# Patient Record
Sex: FEMALE | Race: ASIAN
[De-identification: names, ages, dates, MRNs, and addresses within clinical notes are randomized per-mention and may not be internally consistent; named-entity substitution may affect disease eponyms.]

---

## 2021-03-31 ENCOUNTER — HOSPITAL ENCOUNTER (EMERGENCY)
Dept: HOSPITAL 76 - ED | Age: 26
Discharge: HOME | End: 2021-03-31
Payer: COMMERCIAL

## 2021-03-31 VITALS — DIASTOLIC BLOOD PRESSURE: 80 MMHG | SYSTOLIC BLOOD PRESSURE: 121 MMHG

## 2021-03-31 DIAGNOSIS — M72.2: Primary | ICD-10-CM

## 2021-03-31 PROCEDURE — 99282 EMERGENCY DEPT VISIT SF MDM: CPT

## 2021-03-31 PROCEDURE — 99283 EMERGENCY DEPT VISIT LOW MDM: CPT

## 2021-03-31 NOTE — XRAY REPORT
PROCEDURE:  Calcaneus RT

 

INDICATIONS:  foot pain

 

TECHNIQUE:  Two views of the calcaneus were acquired.  

 

COMPARISON:  None

 

FINDINGS:  

Bones:  No fractures or dislocations.  No suspicious bony lesions.  

 

Soft tissues:  No suspicious calcifications.  Achilles tendon appears normal.  

 

IMPRESSION:  

No evidence of acute bony abnormality of the right calcaneus.

 

Reviewed by: Brice Ashraf MD on 3/31/2021 8:38 PM PDT

Approved by: Brice Ashraf MD on 3/31/2021 8:38 PM PDT

 

 

Station ID:  [id number]

## 2021-03-31 NOTE — ED PHYSICIAN DOCUMENTATION
<Beka Drummond - Last Filed: 03/31/21 20:08>





PD HPI LOWER EXT INJURY





- Stated complaint


Stated Complaint: RIGHT FOOT PX





- Chief complaint


Chief Complaint: Trauma Ext





- History obtained from


History obtained from: Patient





- Additional information


Additional information: 





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 

felt a sudden severe pain in her right heel.  No fall.  But pain was bad enough 

that it made her see white briefly.  She points to the anterior/inferior part of

the heel as the site of the pain.





Review of Systems


Constitutional: reports: Reviewed and negative


Eyes: reports: Reviewed and negative


Ears: reports: Reviewed and negative


Nose: reports: Reviewed and negative


Throat: reports: Reviewed and negative


Cardiac: reports: Reviewed and negative





PD PAST MEDICAL HISTORY





- Past Medical History


Psych: Panic attacks





- Past Surgical History


Past Surgical History: Yes





- Present Medications


Home Medications: 


                                Ambulatory Orders











 Medication  Instructions  Recorded  Confirmed


 


Birth Control 1 tab PO DAILY 06/03/16 03/31/21














- Allergies


Allergies/Adverse Reactions: 


                                    Allergies











Allergy/AdvReac Type Severity Reaction Status Date / Time


 


No Known Drug Allergies Allergy   Verified 03/31/21 18:08














- Social History


Does the pt smoke?: No


Smoking Status: Never smoker


Does the pt drink ETOH?: No


Does the pt have substance abuse?: No





- Immunizations


Immunizations are current?: Yes





PD ED PE NORMAL





- Vitals


Vital signs reviewed: Yes





- General


General: Alert and oriented X 3, No acute distress





- Extremities


Extremities: Other (No Achilles tenderness, no posterior calcaneal tenderness.  

She has tenderness at the insertion of the plantar fascia of the right foot 

without plantar fascial tenderness.)





- Neuro


Neuro: Alert and oriented X 3, Normal speech





<Franklin Mckeon - Last Filed: 03/31/21 20:11>





PD HPI LOWER EXT INJURY





- History obtained from


History obtained from: Patient





- History of Present Illness


PD HPI LOW EXT INJURY LOCATION: Right, Foot, Sole / plantar





Results





- Vitals


Vitals: 





                               Vital Signs - 24 hr











  03/31/21





  18:08


 


Temperature 36.8 C


 


Heart Rate 80


 


Respiratory 16





Rate 


 


Blood Pressure 121/80


 


O2 Saturation 99








                                     Oxygen











O2 Source                      Room air

## 2021-03-31 NOTE — ED PHYSICIAN DOCUMENTATION
PD HPI UPPER EXT INJURY





- Stated complaint


Stated Complaint: RIGHT FOOT PX





- Chief complaint


Chief Complaint: Trauma Ext





- History obtained from


History obtained from: Patient





- Additonal information


Additional information: 





She was walking down a ramp today and boots and felt a sudden stabbing pain in 

the bottom of the right heel.  Now it hurts anytime she puts pressure on it.





Review of Systems


Constitutional: reports: Reviewed and negative


Eyes: reports: Reviewed and negative


Ears: reports: Reviewed and negative


Nose: reports: Reviewed and negative





PD PAST MEDICAL HISTORY





- Past Medical History


Past Medical History: No


Psych: Panic attacks





- Past Surgical History


Past Surgical History: Yes





- Present Medications


Home Medications: 


                                Ambulatory Orders











 Medication  Instructions  Recorded  Confirmed


 


Birth Control 1 tab PO DAILY 06/03/16 03/31/21














- Allergies


Allergies/Adverse Reactions: 


                                    Allergies











Allergy/AdvReac Type Severity Reaction Status Date / Time


 


No Known Drug Allergies Allergy   Verified 03/31/21 18:08














- Social History


Does the pt smoke?: No


Smoking Status: Never smoker


Does the pt drink ETOH?: No


Does the pt have substance abuse?: No





- Immunizations


Immunizations are current?: Yes





- POLST


Patient has POLST: No





PD ED PE NORMAL





- Vitals


Vital signs reviewed: Yes





- General


General: Alert and oriented X 3, No acute distress





- Extremities


Extremities: Other (She is tender at the insertion of plantar fascia on the 

bottom of the anterior part of the calcaneus.  The plantar fascia itself is 

nontender.  No Achilles tenderness or posterior calcaneal tenderness.)





- Neuro


Neuro: Alert and oriented X 3, Normal speech





Results





- Vitals


Vitals: 


                               Vital Signs - 24 hr











  03/31/21





  18:08


 


Temperature 36.8 C


 


Heart Rate 80


 


Respiratory 16





Rate 


 


Blood Pressure 121/80


 


O2 Saturation 99








                                     Oxygen











O2 Source                      Room air

















PD MEDICAL DECISION MAKING





- ED course


ED course: 





26-year-old woman with focal pain at the insertion of the plantar fascia at the 

calcaneus.  X-ray of the calcaneus negative for acute pathology.  Close watchful

waiting was advised.





Departure





- Departure


Disposition: 01 Home, Self Care


Clinical Impression: 


 Plantar fasciitis of right foot





Condition: Good


Record reviewed to determine appropriate education?: Yes


Instructions:  ED Sprain Foot


Comments: 


Motrin as needed for pain. Return as needed. Followup with your physician in 1 

week if not better.


Discharge Date/Time: 03/31/21 21:00

## 2021-04-07 NOTE — EXTERNAL MEDICAL SUMMARY RPT
Continuity of Care Document

                            Created on:2021



Patient:MILTON PRIETO

Sex:Female

:1995

External Reference #:[id number]





Demographics







                          Phone                     Unavailable

 

                          Preferred Language        Unknown

 

                          Marital Status            Unknown

 

                          Church Affiliation     Unknown

 

                          Race                      Unknown

 

                          Ethnic Group              Unknown









Author







                          Organization              [facility]

 

                          Address                    [place] [address]

 

                          Phone                     [phone]









Social History







                     date                description         facility

 

                     [id number]

## 2021-09-15 NOTE — ED PHYSICIAN DOCUMENTATION
History of Present Illness





- Stated complaint


Stated Complaint: POST VACCINE SYMPTOMS





- Chief complaint


Chief Complaint: General





- History obtained from


History obtained from: Patient





- History of Present Illness


Timing: Today


Pain level max: 0


Pain level now: 0





- Additonal information


Additional information: 





Patient is a 26-year-old female who states that she received her Pfizer Covid 

vaccination this morning.  Since that time she has had spasms in her hands and 

feet along with tingling in her hands and feet as well.  She has felt very 

nervous and anxious.  Had diarrhea x2.  Nothing makes it better or worse.





Review of Systems


Constitutional: denies: Fever, Chills


Nose: denies: Rhinorrhea / runny nose, Congestion


Throat: denies: Sore throat


Cardiac: denies: Chest pain / pressure


Respiratory: denies: Dyspnea, Cough


GI: denies: Vomiting, Diarrhea


Skin: denies: Rash


Musculoskeletal: denies: Neck pain, Back pain


Neurologic: denies: Headache





PD PAST MEDICAL HISTORY





- Past Medical History


Past Medical History: Yes


Psych: Panic attacks





- Past Surgical History


Past Surgical History: Yes





- Present Medications


Home Medications: 


                                Ambulatory Orders











 Medication  Instructions  Recorded  Confirmed


 


Birth Control 1 tab PO DAILY 06/03/16 03/31/21














- Allergies


Allergies/Adverse Reactions: 


                                    Allergies











Allergy/AdvReac Type Severity Reaction Status Date / Time


 


No Known Drug Allergies Allergy   Verified 09/15/21 16:36














- Social History


Does the pt smoke?: No


Smoking Status: Never smoker


Does the pt drink ETOH?: No


Does the pt have substance abuse?: No





- Immunizations


Immunizations are current?: Yes





- POLST


Patient has POLST: No





PD ED PE NORMAL





- Vitals


Vital signs reviewed: Yes





- General


General: Alert and oriented X 3, Well developed/nourished, Other (Patient is 

anxious appearing)





- HEENT


HEENT: PERRL, Moist mucous membranes





- Neck


Neck: Supple, no meningeal sign





- Cardiac


Cardiac: RRR, No murmur, Strong equal pulses





- Respiratory


Respiratory: No respiratory distress, Clear bilaterally





- Abdomen


Abdomen: Soft, Non tender, Non distended





- Derm


Derm: Warm and dry





- Extremities


Extremities: No edema, No calf tenderness / cord, Other (Brisk cap refill in all

4 extremities.  Normal pulses.)





- Neuro


Neuro: Alert and oriented X 3, CNs 2-12 intact, No motor deficit, No sensory 

deficit, Normal speech





- Psych


Psych: Other (Anxious)





Results





- Vitals


Vitals: 


                                     Oxygen











O2 Source                      Room air

















- Labs


Labs: 


                                Laboratory Tests











  09/15/21 09/15/21 09/15/21





  18:37 18:37 19:33


 


WBC  10.6  


 


RBC  4.81  


 


Hgb  14.6  


 


Hct  44.1  


 


MCV  91.7  


 


MCH  30.4  


 


MCHC  33.1  


 


RDW  12.2  


 


Plt Count  230  


 


MPV  10.6  


 


Neut # (Auto)  6.0  


 


Lymph # (Auto)  3.4  


 


Mono # (Auto)  0.7  


 


Eos # (Auto)  0.4  


 


Baso # (Auto)  0.1  


 


Absolute Nucleated RBC  0.00  


 


Nucleated RBC %  0.0  


 


Sodium   136 


 


Potassium   3.7 


 


Chloride   100 L 


 


Carbon Dioxide   27 


 


Anion Gap   9.0 


 


BUN   17 


 


Creatinine   0.9 


 


Estimated GFR (MDRD)   75 L 


 


Glucose   98 


 


Calcium   9.6 


 


Urine Color    LIGHT YELLOW


 


Urine Clarity    CLEAR


 


Urine pH    6.0


 


Ur Specific Gravity    1.010


 


Urine Protein    NEGATIVE


 


Urine Glucose (UA)    NEGATIVE


 


Urine Ketones    NEGATIVE


 


Urine Occult Blood    NEGATIVE


 


Urine Nitrite    NEGATIVE


 


Urine Bilirubin    NEGATIVE


 


Urine Urobilinogen    0.2 (NORMAL)


 


Ur Leukocyte Esterase    NEGATIVE


 


Ur Microscopic Review    NOT INDICATED


 


Urine Culture Comments    NOT INDICATED


 


Urine HCG, Qual    NEGATIVE














PD MEDICAL DECISION MAKING





- ED course


Complexity details: reviewed results, re-evaluated patient, considered 

differential, d/w patient


ED course: 





Patient appears to be having a panic attack.  Feels better after benzodiazepine 

administration.  Symptoms improved.  No evidence of allergic reaction or 

anaphylaxis.  No lab abnormalities.  Patient counseled regarding signs and 

symptoms for which I believe and urgent re-evaluation would be necessary. 

Patient with good understanding of and agreement to plan and is comfortable 

going home at this time





This document was made in part using voice recognition software. While efforts 

are made to proofread this document, sound alike and grammatical errors may 

occur.





Departure





- Departure


Disposition: 01 Home, Self Care


Clinical Impression: 


 Paresthesia, Hyperventilation syndrome





Condition: Good


Instructions:  ED Paraesthesias


Follow-Up: 


your,doctor as needed [Other]


Comments: 


Please follow-up with your doctor for further care.  Return if you worsen.  Your

 testing is normal tonight.


Discharge Date/Time: 09/15/21 20:07

## 2022-08-18 ENCOUNTER — HOSPITAL ENCOUNTER (EMERGENCY)
Age: 27
Discharge: HOME | End: 2022-08-18
Payer: OTHER GOVERNMENT

## 2022-08-18 VITALS
DIASTOLIC BLOOD PRESSURE: 59 MMHG | RESPIRATION RATE: 14 BRPM | OXYGEN SATURATION: 98 % | SYSTOLIC BLOOD PRESSURE: 123 MMHG | HEART RATE: 88 BPM | TEMPERATURE: 97.16 F

## 2022-08-18 VITALS
DIASTOLIC BLOOD PRESSURE: 61 MMHG | SYSTOLIC BLOOD PRESSURE: 120 MMHG | OXYGEN SATURATION: 98 % | HEART RATE: 93 BPM | RESPIRATION RATE: 18 BRPM

## 2022-08-18 VITALS — BODY MASS INDEX: 24.2 KG/M2

## 2022-08-18 DIAGNOSIS — O20.9: Primary | ICD-10-CM

## 2022-08-18 DIAGNOSIS — Z3A.01: ICD-10-CM

## 2022-08-18 LAB
ADD MANUAL DIFF / SLIDE REVIEW: NO
ALBUMIN SERPL-MCNC: 4.3 G/DL (ref 3.5–5)
ALBUMIN/GLOB SERPL: 1.3 {RATIO} (ref 1–2.8)
ALP SERPL-CCNC: 72 U/L (ref 38–126)
ALT SERPL-CCNC: 28 IU/L (ref ?–35)
B-HCG SERPL-ACNC: (no result) MIU/ML
BUN SERPL-MCNC: 14 MG/DL (ref 7–17)
CALCIUM SERPL-MCNC: 8.8 MG/DL (ref 8.4–10.2)
CHLORIDE SERPL-SCNC: 104 MMOL/L (ref 98–107)
CO2 SERPL-SCNC: 28 MMOL/L (ref 22–32)
ESTIMATED GLOMERULAR FILT RATE: > 60 ML/MIN (ref 60–?)
GLOBULIN SER CALC-MCNC: 3.2 G/DL (ref 1.7–4.1)
GLUCOSE SERPL-MCNC: 95 MG/DL (ref 70–100)
HEMATOCRIT: 39.5 % (ref 36–46)
HEMOGLOBIN: 13.8 G/DL (ref 12–16)
HEMOLYSIS: < 15 (ref 0–50)
LYMPHOCYTES # SPEC AUTO: 2300 /UL (ref 1100–4500)
MCV RBC: 89.3 FL (ref 80–100)
MEAN CORPUSCULAR HEMOGLOBIN: 31.1 PG (ref 26–34)
MEAN CORPUSCULAR HGB CONC: 34.8 % (ref 30–36)
PLATELET COUNT: 225 X10^3/UL (ref 150–400)
POTASSIUM SERPL-SCNC: 3.6 MMOL/L (ref 3.4–5.1)
PROT SERPL-MCNC: 7.5 G/DL (ref 6.3–8.2)
SODIUM SERPL-SCNC: 139 MMOL/L (ref 137–145)

## 2022-08-18 PROCEDURE — 84702 CHORIONIC GONADOTROPIN TEST: CPT

## 2022-08-18 PROCEDURE — 99283 EMERGENCY DEPT VISIT LOW MDM: CPT

## 2022-08-18 PROCEDURE — 36415 COLL VENOUS BLD VENIPUNCTURE: CPT

## 2022-08-18 PROCEDURE — 81003 URINALYSIS AUTO W/O SCOPE: CPT

## 2022-08-18 PROCEDURE — 76817 TRANSVAGINAL US OBSTETRIC: CPT

## 2022-08-18 PROCEDURE — 86901 BLOOD TYPING SEROLOGIC RH(D): CPT

## 2022-08-18 PROCEDURE — 80053 COMPREHEN METABOLIC PANEL: CPT

## 2022-08-18 PROCEDURE — 76801 OB US < 14 WKS SINGLE FETUS: CPT

## 2022-08-18 PROCEDURE — 99284 EMERGENCY DEPT VISIT MOD MDM: CPT

## 2022-08-18 PROCEDURE — 86900 BLOOD TYPING SEROLOGIC ABO: CPT

## 2022-08-18 PROCEDURE — 85025 COMPLETE CBC W/AUTO DIFF WBC: CPT

## 2022-08-25 ENCOUNTER — HOSPITAL ENCOUNTER (OUTPATIENT)
Age: 27
End: 2022-08-25
Payer: OTHER GOVERNMENT

## 2022-08-25 DIAGNOSIS — O26.20: Primary | ICD-10-CM

## 2022-08-25 LAB — B-HCG SERPL-ACNC: (no result) MIU/ML

## 2022-08-25 PROCEDURE — 36415 COLL VENOUS BLD VENIPUNCTURE: CPT

## 2022-08-25 PROCEDURE — 84702 CHORIONIC GONADOTROPIN TEST: CPT

## 2022-08-30 ENCOUNTER — HOSPITAL ENCOUNTER (OUTPATIENT)
Age: 27
End: 2022-08-30
Payer: OTHER GOVERNMENT

## 2022-08-30 DIAGNOSIS — Z34.81: Primary | ICD-10-CM

## 2022-08-30 DIAGNOSIS — Z3A.01: ICD-10-CM

## 2022-08-30 PROCEDURE — 76801 OB US < 14 WKS SINGLE FETUS: CPT

## 2022-08-30 PROCEDURE — 76817 TRANSVAGINAL US OBSTETRIC: CPT

## 2022-09-21 ENCOUNTER — HOSPITAL ENCOUNTER (OUTPATIENT)
Age: 27
End: 2022-09-21
Payer: OTHER GOVERNMENT

## 2022-09-21 DIAGNOSIS — Z34.81: Primary | ICD-10-CM

## 2022-09-21 LAB
ADD MANUAL DIFF / SLIDE REVIEW: NO
APPEARANCE UR: (no result)
BILIRUBIN URINE UA: NEGATIVE
COLOR UR: YELLOW
GLUCOSE URINE UA: NEGATIVE G/DL
HBV SURFACE AG SERPL QL IA: NEGATIVE S/C
HCV AB SERPL QL IA: NEGATIVE S/C
HEMATOCRIT: 37.6 % (ref 36–46)
HEMOGLOBIN: 13.1 G/DL (ref 12–16)
HGB UR QL: NEGATIVE
HIV 1+2 AB+HIV1 P24 AG SERPL QL IA: NEGATIVE
KETONES URINE UA: NEGATIVE
LEUKOCYTE ESTERASE URINE UA: NEGATIVE
LYMPHOCYTES # SPEC AUTO: 2300 /UL (ref 1100–4500)
MCV RBC: 87.5 FL (ref 80–100)
MEAN CORPUSCULAR HEMOGLOBIN: 30.6 PG (ref 26–34)
MEAN CORPUSCULAR HGB CONC: 35 % (ref 30–36)
NITRITE URINE UA: NEGATIVE
PH UR: 7 [PH] (ref 4.5–8)
PLATELET COUNT: 223 X10^3/UL (ref 150–400)
PROTEIN URINE UA: NEGATIVE
SP GR UR: 1.01 (ref 1–1.03)
UROBILINOGEN UR QL: 0.2 E.U./DL

## 2022-09-21 PROCEDURE — 86900 BLOOD TYPING SEROLOGIC ABO: CPT

## 2022-09-21 PROCEDURE — 86850 RBC ANTIBODY SCREEN: CPT

## 2022-09-21 PROCEDURE — 81003 URINALYSIS AUTO W/O SCOPE: CPT

## 2022-09-21 PROCEDURE — 87389 HIV-1 AG W/HIV-1&-2 AB AG IA: CPT

## 2022-09-21 PROCEDURE — 87086 URINE CULTURE/COLONY COUNT: CPT

## 2022-09-21 PROCEDURE — 36415 COLL VENOUS BLD VENIPUNCTURE: CPT

## 2022-09-21 PROCEDURE — 86901 BLOOD TYPING SEROLOGIC RH(D): CPT

## 2022-09-21 PROCEDURE — 87147 CULTURE TYPE IMMUNOLOGIC: CPT

## 2022-09-21 PROCEDURE — 86803 HEPATITIS C AB TEST: CPT

## 2022-09-21 PROCEDURE — 86787 VARICELLA-ZOSTER ANTIBODY: CPT

## 2022-10-11 ENCOUNTER — HOSPITAL ENCOUNTER (OUTPATIENT)
Age: 27
End: 2022-10-11
Payer: OTHER GOVERNMENT

## 2022-10-11 DIAGNOSIS — Z11.3: ICD-10-CM

## 2022-10-11 DIAGNOSIS — Z3A.12: ICD-10-CM

## 2022-10-11 DIAGNOSIS — Z34.81: Primary | ICD-10-CM

## 2022-10-11 LAB
C TRACH DNA UR QL NAA+PROBE: NOT DETECTED
N GONORRHOEA RRNA UR QL NAA+PROBE: NOT DETECTED

## 2022-10-11 PROCEDURE — 87591 N.GONORRHOEAE DNA AMP PROB: CPT

## 2022-10-11 PROCEDURE — 87491 CHLMYD TRACH DNA AMP PROBE: CPT

## 2022-11-08 ENCOUNTER — HOSPITAL ENCOUNTER (OUTPATIENT)
Age: 27
End: 2022-11-08
Payer: OTHER GOVERNMENT

## 2022-11-08 DIAGNOSIS — Z3A.16: ICD-10-CM

## 2022-11-08 DIAGNOSIS — Z34.82: Primary | ICD-10-CM

## 2022-11-08 PROCEDURE — 36415 COLL VENOUS BLD VENIPUNCTURE: CPT

## 2022-11-08 PROCEDURE — 82105 ALPHA-FETOPROTEIN SERUM: CPT

## 2022-11-10 LAB
GA: 17 WEEKS
NEURAL TUBE DEFECT RISK FETUS: (no result) %

## 2022-12-05 ENCOUNTER — HOSPITAL ENCOUNTER (OUTPATIENT)
Age: 27
End: 2022-12-05
Payer: OTHER GOVERNMENT

## 2022-12-05 DIAGNOSIS — Z34.82: Primary | ICD-10-CM

## 2022-12-05 DIAGNOSIS — Z3A.21: ICD-10-CM

## 2022-12-05 PROCEDURE — 76811 OB US DETAILED SNGL FETUS: CPT

## 2023-01-17 ENCOUNTER — HOSPITAL ENCOUNTER (OUTPATIENT)
Age: 28
End: 2023-01-17
Payer: OTHER GOVERNMENT

## 2023-01-17 DIAGNOSIS — Z3A.26: ICD-10-CM

## 2023-01-17 DIAGNOSIS — Z34.82: Primary | ICD-10-CM

## 2023-01-17 LAB
GTT (PREG) 1 HOUR PP 50GM DOSE: 195 MG/DL (ref 76–139)
HEMATOCRIT: 32.9 % (ref 36–46)
HEMOGLOBIN: 11.2 G/DL (ref 12–16)

## 2023-01-17 PROCEDURE — 82950 GLUCOSE TEST: CPT

## 2023-01-17 PROCEDURE — 85018 HEMOGLOBIN: CPT

## 2023-01-17 PROCEDURE — 85014 HEMATOCRIT: CPT

## 2023-01-17 PROCEDURE — 36415 COLL VENOUS BLD VENIPUNCTURE: CPT

## 2023-02-21 ENCOUNTER — HOSPITAL ENCOUNTER (OUTPATIENT)
Dept: HOSPITAL 73 - OB | Age: 28
Discharge: HOME | End: 2023-02-21
Payer: OTHER GOVERNMENT

## 2023-02-21 DIAGNOSIS — Z3A.31: ICD-10-CM

## 2023-02-21 DIAGNOSIS — O24.415: Primary | ICD-10-CM

## 2023-02-21 PROCEDURE — G0378 HOSPITAL OBSERVATION PER HR: HCPCS

## 2023-02-21 PROCEDURE — 59025 FETAL NON-STRESS TEST: CPT

## 2023-02-21 PROCEDURE — G0379 DIRECT REFER HOSPITAL OBSERV: HCPCS

## 2023-02-28 ENCOUNTER — HOSPITAL ENCOUNTER (OUTPATIENT)
Dept: HOSPITAL 73 - OB | Age: 28
Discharge: HOME | End: 2023-02-28
Payer: OTHER GOVERNMENT

## 2023-02-28 DIAGNOSIS — Z3A.32: ICD-10-CM

## 2023-02-28 DIAGNOSIS — O24.415: Primary | ICD-10-CM

## 2023-02-28 PROCEDURE — 59025 FETAL NON-STRESS TEST: CPT

## 2023-02-28 PROCEDURE — G0379 DIRECT REFER HOSPITAL OBSERV: HCPCS

## 2023-02-28 PROCEDURE — G0378 HOSPITAL OBSERVATION PER HR: HCPCS

## 2023-03-06 ENCOUNTER — HOSPITAL ENCOUNTER (OUTPATIENT)
Dept: HOSPITAL 73 - LABOR | Age: 28
Discharge: HOME | End: 2023-03-06
Payer: OTHER GOVERNMENT

## 2023-03-06 DIAGNOSIS — Z3A.33: ICD-10-CM

## 2023-03-06 DIAGNOSIS — O24.415: Primary | ICD-10-CM

## 2023-03-06 PROCEDURE — G0378 HOSPITAL OBSERVATION PER HR: HCPCS

## 2023-03-06 PROCEDURE — G0379 DIRECT REFER HOSPITAL OBSERV: HCPCS

## 2023-03-06 PROCEDURE — 59025 FETAL NON-STRESS TEST: CPT

## 2023-03-13 ENCOUNTER — HOSPITAL ENCOUNTER (OUTPATIENT)
Dept: HOSPITAL 73 - LABOR | Age: 28
Discharge: HOME | End: 2023-03-13
Payer: OTHER GOVERNMENT

## 2023-03-13 DIAGNOSIS — O24.415: Primary | ICD-10-CM

## 2023-03-13 DIAGNOSIS — O26.23: ICD-10-CM

## 2023-03-13 DIAGNOSIS — Z3A.34: ICD-10-CM

## 2023-03-13 PROCEDURE — G0379 DIRECT REFER HOSPITAL OBSERV: HCPCS

## 2023-03-13 PROCEDURE — G0378 HOSPITAL OBSERVATION PER HR: HCPCS

## 2023-03-13 PROCEDURE — 59025 FETAL NON-STRESS TEST: CPT

## 2023-03-20 ENCOUNTER — HOSPITAL ENCOUNTER (OUTPATIENT)
Dept: HOSPITAL 73 - OB | Age: 28
Discharge: HOME | End: 2023-03-20
Payer: OTHER GOVERNMENT

## 2023-03-20 DIAGNOSIS — O26.23: ICD-10-CM

## 2023-03-20 DIAGNOSIS — O24.415: Primary | ICD-10-CM

## 2023-03-20 DIAGNOSIS — Z3A.35: ICD-10-CM

## 2023-03-20 PROCEDURE — G0378 HOSPITAL OBSERVATION PER HR: HCPCS

## 2023-03-20 PROCEDURE — 59025 FETAL NON-STRESS TEST: CPT

## 2023-03-20 PROCEDURE — G0379 DIRECT REFER HOSPITAL OBSERV: HCPCS

## 2023-03-31 NOTE — PROCEDURE REPORT
- HPI


Diagnosis/Indication for NST: Gestational Diabetes


                                                               Current Pregnancy





EDU                              23


Gestation                        37 Weeks and 2 Days


                          1


Para                             0





Vital Signs











Temperature  36.6 C   23 14:48


 


Heart Rate  86   23 14:48


 


Respiratory Rate  16   23 14:48


 


Blood Pressure  112/69   23 14:48


 


O2 Saturation  100   23 14:48




















Temperature  36.6 C   23 14:51


 


Heart Rate  86   23 14:48


 


Respiratory Rate  16   23 14:48


 


Blood Pressure  112/69   23 14:48


 


O2 Saturation  100   23 14:48


 


If not protocol: Oxygen Flow, liters/minute      














- NST Procedure


NST Procedure





Start Date                       23


Start Time                       14:45


Stop Time                        15:15


Vibroacoustic Stimulation Used   No


Patient States Fetal Movement    Yes











- Results and Plan


Plan: 





NST reactive. FHR baseline 120s, moderate variability, + accels, no decels


No contractions appreciated via tocometry





FINAL DIAGNOSIS:


A2 Gestational diabetes, third trimester

## 2023-04-03 NOTE — ULTRASOUND REPORT
PROCEDURE:  OB Fetal Biophysical Profile

 

INDICATIONS:  GDM

 

OUTSIDE/PRIOR DATING DATA:  

Last menstrual period (LMP): 6/28/2022.  

LMP-based estimated date of delivery (MEHNAZ): 4/4/2023.  

First dating scan (date and location): 8/18/2022.  

Estimated date of delivery (MEHNAZ) from first dating scan: 4/19/2023.  

The below data below was generated using the ultrasound MEHNAZ of 4/18/2023

 

TECHNIQUE:  Real-time scanning was performed of the fetus, with image documentation.  Biophysical pro
file was also obtained and uterine artery Doppler.  

 

COMPARISON:  None available

 

FINDINGS:  

 

General:  A single living intrauterine gestation is present.  

Presentation:  Vertex

Placenta:  Placental position is anterior, without previa.  

Amniotic fluid index:  12.5 cm, normal for gestational age.  Largest pocket 4.1 cm.

Fetal heart rate:  140 beats per minute. 

Maternal cervical canal:  Not well seen.  

 

Estimated gestational age from initial scan: 37 weeks 5 days.  

 

Biophysical profile:  

Tone:  0 points.

Movement:  2 points.  

Respiration:  2 points.  

Largest pocket of fluid:  2 points.  

 

Umbilical artery Doppler:  2.58; 2.75; 3.04. Preserved diastolic flow.

 

IMPRESSION:  

1. Prasad living intrauterine pregnancy at 37 weeks 5 days based on prior ultrasound. 

 

2. Normal placenta and amniotic fluid.

 

3. Biophysical profile score 6 out of 8. Tone is not appreciated. 

Uterine artery Doppler is within normal limits.

 

Reviewed by: Scot Wayne MD on 4/3/2023 6:09 PM PDT

Approved by: Scot Wayne MD on 4/3/2023 6:09 PM PDT

 

 

Station ID:  [id number]

## 2023-04-05 NOTE — HISTORY & PHYSICAL EXAMINATION
Prenatal Admit History





- Visit Reason


Visit Reason: Contractions





- Pregnancy


: 1


Prenatal Risk/History: positive: None


Pregnancy Complications This Pregnancy: positive: Gestational diabetes 

(Metformin)


Smoking Status: Never smoker





- Mother's Labs


Mother's Blood Type: positive: O


Mother's RH: positive: Positive


GBS: positive: Group B Strep Positive


Rubella Status: positive: Non-immune, Equivocal





- Other Maternal History


Other Maternal History: 





Med: Anxiety, depression, PCOS


Surg: [place] teeth


Fam: Hypertension


Social: , denies dona





Meds/Allgy





- Home Medications


Home Medications: 


                                Ambulatory Orders











 Medication  Instructions  Recorded  Confirmed


 


Birth Control 1 tab PO DAILY 16














- Allergies


Allergies/Adverse Reactions: 


                                    Allergies











Allergy/AdvReac Type Severity Reaction Status Date / Time


 


No Known Drug Allergies Allergy   Verified 09/15/21 16:36














Review of Systems





- All Other Systems


All Other Systems: reports: Reviewed and negative





Physical





- Abdominal Exam


Contraction Frequency (min/apart): 2


Contraction Intensity: positive: Moderate to strong


Uterine Resting Tone: positive: Soft





- Fetal Monitoring


Fetal Heart Rate Baseline: 150


Fetal Strip Review: positive: Category I





- Presentation


Presentation: positive: Vertex





- Vaginal Exam


Dilation (in cm): 10


Effacement (%): 100


Station: positive: 0





Plan for Labor





- Plan For Labor


I expect patient to be DC'd or transferred within 96 hours.: Yes


Plan for Labor: 





29yo  at 38w by 9w US admitted in active labor


- Admit


- GBS POS, received one dose ampicillin 


- Anticipate

## 2023-04-05 NOTE — EXTERNAL MEDICAL SUMMARY RPT
Continuity of Care Document

                            Created on:2023



Patient:LIBRA PRIETO

Sex:Female

:1995

External Reference #:[id number]





Demographics







                          Address                   [address] [address]

 

                          Phone                     Unavailable

 

                          Preferred Language        English

 

                          Marital Status            

 

                          Christian Affiliation     Unknown

 

                          Race                      Unknown

 

                          Ethnic Group              Unknown









Author







                          Organization              [facility]

 

                          [place] [address]

 

                          Phone                     [phone]









Care Team Providers







                    Name                Role                Phone

 

                    SotoMoises ramires      Unavailable         Unavailable









Allergies and Intolerances







                 date            description     facility        type

 

                 (no date)       [facility]  (unknown)

 

                 (no date)       [facility]  (unknown)







Encounters

No information.



Functional Status

No information.



Immunizations







                     date                description         facility

 

                     2023 00:00    Tetanus, Diphtheria, Pertussis (Tdap)  

[facility]







Medications







                     date                description         facility

 

                     2023 00:00    [facility]

 

                     2023 00:00    [facility]







Problems







                     date                description         facility

 

                     2023 17:25    Encounter for supervision of other [facility]



                                        pregnancy, second   

 

                     2023 17:25    26 weeks gestation of \[facility]\""

 

                     2023 00:00    [facility]ational diabetes mellitus (GDM)  [facility]

 

                     2023 13:50    Gestational diabetes mellitus in [facility]



                                        controlled by or    

 

                     2023 15:55    Gestational diabetes mellitus in [facility]



                                        controlled by or    

 

                     2023 00:00    34 weeks gestation of \[facility]\""

 

                     2023 08:16    Gestational diabetes mellitus in [facility]



                                        controlled by or    

 

                     2023 17:11    Gestational diabetes mellitus in [facility]



                                        controlled by or    

 

                     2023 17:11    34 weeks gestation of \[facility]\""

 

                     2023 13:11    Gestational diabetes mellitus in [facility]



                                        controlled by or    

 

                     2023 13:11    34 weeks gestation of \[facility]\""

 

                     2023 00:00    35 weeks gestation of \[facility]\""

 

                     2023 13:54    Recurrent pregnancy loss  [facility]

 

                     2023 13:54    Gestational diabetes mellitus in [facility]



                                        controlled by or    

 

                     2023 13:54    35 weeks gestation of \[facility]\""

 

                     2023 14:03    Recurrent pregnancy loss  [facility]

 

                     2023 14:03    Gestational diabetes mellitus in [facility]



                                        controlled by or    

 

                     2023 14:03    35 weeks gestation of \[facility]\""

 

                     2023 15:00    Recurrent pregnancy loss  [facility]

 

                     2023 15:00    Gestational diabetes mellitus in [facility]



                                        controlled by or    

 

                     2023 15:00    35 weeks gestation of \[facility]\""

 

                     2023 15:16    Recurrent pregnancy loss  [facility]

 

                     2023 15:16    Gestational diabetes mellitus in [facility]



                                        controlled by or    

 

                     2023 15:16    35 weeks gestation of \[facility]\""

 

                     2023 17:10    Recurrent pregnancy loss  [facility]

 

                     2023 17:10    Gestational diabetes mellitus in [facility]



                                        controlled by or    

 

                     2023 17:10    35 weeks gestation of \[facility]\""

 

                     2023 07:01    Recurrent pregnancy loss  [facility]

 

                     2023 07:01    Gestational diabetes mellitus in [facility]



                                        controlled by or    

 

                     2023 07:01    35 weeks gestation of \[facility]\""







Procedures

No information.



Results/Labs







           test      date      author    facility  value     unit      interpret

ation









                                         Result panel 1









           (unknown)  (no date)  (unknown)  [facility]  ([facility]    (unk

nown)



                                        [facility]            unknown)  









                                         Result panel 2









           (unknown)  (no date)  (unknown)  [facility]    (no value)  (units    (unk

nown)



                                        Hospital            unknown)  









                                         Result panel 3









           (unknown)  (no date)  (unknown)  [facility]    (no value)  (units    (unk

nown)



                                        Hospital            unknown)  









                                         Result panel 4









           (unknown)  (no date)  (unknown)  [facility]    (no value)  (units    (unk

nown)



                                        [facility]ospital            unknown)  









                                         Result panel 5









           (unknown)  (no date)  (unknown)  [facility]    (no value)  (units    (unk

nown)



                                        Hospital            unknown)  









                                         Result panel 6









           (unknown)  (no date)  (unknown)  [facility]    (no value)  (units    (unk

nown)



                                        [facility]ospital            unknown)  









                                         Result panel 7









           (unknown)  (no date)  (unknown)  [facility]    (no value)  (units    (unk

nown)



                                        Hospital            unknown)  









                                         Result panel 8









           (unknown)  (no date)  (unknown)  [facility]    (no value)  (units    (unk

nown)



                                        Hospital            unknown)  









                                         Result panel 9









           (unknown)  (no date)  (unknown)  [facility]    (no value)  (units    ([facility])



                                        Hospital            unknown)  









                                         Result panel 10









           (unknown)  (no date)  (unknown)  [facility]    (no value)  (units    ([facility])



                                        [facility]            unknown)  









                                         Result panel 11









           (unknown)  (no date)  (unknown)  (unknown)  11.2      g/dl      (unkn

own)

 

           (unknown)  (no date)  (unknown)  (unknown)  32.9      %         (unkn

own)









                                         Result panel 12









           (unknown)  (no date)  (unknown)  (unknown)  195       mg/dl     (unkn

own)

 

           (unknown)  (no date)  (unknown)  (unknown)  195       mg/dl     (unkn

own)









                                         Result panel 13









           (unknown)  (no       (unknown)  (unknown)  (no value)  (units    (unk

nown)



                    date)                                   unknown)  

 

           (unknown)  (no       (unknown)  (unknown)  (+12 lb) 120/58  (units   

 (unknown)



                    date)                         N         unknown)  

 

           (unknown)  (no       (unknown)  (unknown)  (+13 lb) 104/54  (units   

 (unknown)



                    date)                         N         unknown)  

 

           (unknown)  (no       (unknown)  (unknown)  (+18 lb) 122/60  (units   

 (unknown)



                    date)                         N         unknown)  

 

           (unknown)  (no       (unknown)  (unknown)  (+7 lb) 128/66 N  (units  

  (unknown)



                    date)                                   unknown)  

 

           (unknown)  (no       (unknown)  (unknown)  (+8 lb) 122/68 N  (units  

  (unknown)



                    date)                                   unknown)  

 

           (unknown)  (no       (unknown)  (unknown)  **Genetic  (units    (unkn

own)



                    date)                         Screening/Teratol unknown)  



                                                  ogy Counseling -           



                                                  Includes patient,           



                                                  baby's father, or           

 

           (unknown)  (no       (unknown)  (unknown)  -?-?-?-?-?-?-?-?  (units  

  (unknown)



                    date)                         -?-?-?-?  unknown)  

 

           (unknown)  (no       (unknown)  (unknown)  23  (units    (unkno

wn)



                    date)                                   unknown)  

 

           (unknown)  (no       (unknown)  (unknown)  23  (units    (unkno

wn)



                    date)                                   unknown)  

 

           (unknown)  (no       (unknown)  (unknown)  23]  (units    (unkn

own)



                    date)                                   unknown)  

 

           (unknown)  (no       (unknown)  (unknown)  04/15/23  (units    (unkno

wn)



                    date)                         Ultrasound #1 29w unknown)  



                                                  3d                  

 

           (unknown)  (no       (unknown)  (unknown)  [id number]   (units    (unkno

wn)



                    date)                                   unknown)  

 

           (unknown)  (no       (unknown)  (unknown)  22  (units    (unkno

wn)



                    date)                                   unknown)  

 

           (unknown)  (no       (unknown)  (unknown)  10/11/22  (units    (unkno

wn)



                    date)                                   unknown)  

 

           (unknown)  (no       (unknown)  (unknown)  22  (units    (unkno

wn)



                    date)                                   unknown)  

 

           (unknown)  (no       (unknown)  (unknown)  22  (units    (unkno

wn)



                    date)                                   unknown)  

 

           (unknown)  (no       (unknown)  (unknown)  12w 6d 163 lb  (units    (

unknown)



                    date)                                   unknown)  

 

           (unknown)  (no       (unknown)  (unknown)  13:15     (units    (unkno

wn)



                    date)                                   unknown)  

 

           (unknown)  (no       (unknown)  (unknown)  16w 6d 167 lb  (units    (

unknown)



                    date)                                   unknown)  

 

           (unknown)  (no       (unknown)  (unknown)  20w 5d 168 lb  (units    (

unknown)



                    date)                                   unknown)  

 

           (unknown)  (no       (unknown)  (unknown)  24w 6d 173 lb  (units    (

unknown)



                    date)                                   unknown)  

 

           (unknown)  (no       (unknown)  (unknown)  4 wk      (units    (unkno

wn)



                    date)                                   unknown)  

 

           (unknown)  (no       (unknown)  (unknown)  8w 6d 162 lb  (units    (u

nknown)



                    date)                                   unknown)  

 

           (unknown)  (no       (unknown)  (unknown)  Abnormal lab  (units    (u

nknown)



                    date)                         values 1st unknown)  



                                                  trimester:           



                                                  discussed           

 

           (unknown)  (no       (unknown)  (unknown)  Abnormal lab  (units    (u

nknown)



                    date)                         values 2nd unknown)  



                                                  trimester:           



                                                  discussed           

 

           (unknown)  (no       (unknown)  (unknown)  Add'l Plan  (units    (unk

nown)



                    date)                         Details   unknown)  

 

           (unknown)  (no       (unknown)  (unknown)  Age/Sex: 27 / F  (units   

 (unknown)



                    date)                         Date of Service: unknown)  

 

           (unknown)  (no       (unknown)  (unknown)  Allergies  (units    (unkn

own)



                    date)                         ()   unknown)  

 

           (unknown)  (no       (unknown)  (unknown)  Allergies  (units    (unkn

own)



                    date)                                   unknown)  

 

           (unknown)  (no       (unknown)  (unknown)  [place]  (units    (

unknown)



                    date)                         [address]     unknown)  

 

           (unknown)  (no       (unknown)  (unknown)  Anesthesia  (units    (unk

nown)



                    date)                                   unknown)  

 

           (unknown)  (no       (unknown)  (unknown)  Aneuploidy  (units    (unk

nown)



                    date)                         Screening unknown)  



                                                  Offered: Accepted           



                                                  (wants CFDNA)           

 

           (unknown)  (no       (unknown)  (unknown)  Anticipated  (units    (un

known)



                    date)                         course of unknown)  



                                                  prenatal care:           



                                                  discussed           

 

           (unknown)  (no       (unknown)  (unknown)  Anxiety (-2016)  (units   

 (unknown)



                    date)                                   unknown)  

 

           (unknown)  (no       (unknown)  (unknown)  Arrhythmia  (units    (unk

nown)



                    date)                                   unknown)  

 

           (unknown)  (no       (unknown)  (unknown)  Assessment and  (units    

(unknown)



                    date)                         Plan      unknown)  

 

           (unknown)  (no       (unknown)  (unknown)  Attending Dr:  (units    (

unknown)



                    date)                         Julieta Au unknown)  



                                                  MD                  

 

           (unknown)  (no       (unknown)  (unknown)  Libra presents  (units   

 (unknown)



                    date)                         today for routine unknown)  



                                                  OB f/u at 20w5d.           



                                                  She reports good           



                                                  fetal               

 

           (unknown)  (no       (unknown)  (unknown)  BMI 28.5  (units    (unkno

wn)



                    date)                                   unknown)  

 

           (unknown)  (no       (unknown)  (unknown)  /62  (units    (unkn

own)



                    date)                                   unknown)  

 

           (unknown)  (no       (unknown)  (unknown)  Birth     (units    (unkno

wn)



                    date)                         Plan/Preferences unknown)  

 

           (unknown)  (no       (unknown)  (unknown)  Birth Planning  (units    

(unknown)



                    date)                                   unknown)  

 

           (unknown)  (no       (unknown)  (unknown)  Blood Pressure  (units    

(unknown)



                    date)                         Location Lt unknown)  



                                                  brachial            

 

           (unknown)  (no       (unknown)  (unknown)  Blood     (units    (unkno

wn)



                    date)                         transfusions?: unknown)  



                                                  yes (Never had           



                                                  but would accept)           

 

           (unknown)  (no       (unknown)  (unknown)  Breastfeeding:  (units    

(unknown)



                    date)                         discussed unknown)  

 

           (unknown)  (no       (unknown)  (unknown)  Chicken pox  (units    (un

known)



                    date)                         (-)   unknown)  

 

           (unknown)  (no       (unknown)  (unknown)  Childbirth  (units    (unk

nown)



                    date)                         Classes:  unknown)  



                                                  discussed           

 

           (unknown)  (no       (unknown)  (unknown)  Conceived  (units    (unkn

own)



                    date)                         naturally this unknown)  



                                                  pregnancy           

 

           (unknown)  (no       (unknown)  (unknown)  Confirmed  (units    (unkn

own)



                    date)                         23] unknown)  

 

           (unknown)  (no       (unknown)  (unknown)  Current Estimate  (units  

  (unknown)



                    date)                         23  unknown)  



                                                  Ultrasound #2 28w           



                                                  6d                  

 

           (unknown)  (no       (unknown)  (unknown)  Current   (units    (unkno

wn)



                    date)                         Pregnancy History unknown)  

 

           (unknown)  (no       (unknown)  (unknown)  DNA       (units    (unkno

wn)



                    date)                                   unknown)  

 

           (unknown)  (no       (unknown)  (unknown)  : 1995  (units   

 (unknown)



                    date)                         Acct:[id number] unknown)  

 

           (unknown)  (no       (unknown)  (unknown)  Date of positive  (units  

  (unknown)



                    date)                         home pregnancy unknown)  



                                                  test: 08/15/22           

 

           (unknown)  (no       (unknown)  (unknown)  Date      (units    (unkno

wn)



                    date)                                   unknown)  

 

           (unknown)  (no       (unknown)  (unknown) Denies Congenital  (units  

  (unknown)



                    date)                         Heart Defect, unknown)  



                                                  Denies Down           



                                                  Syndrome, Denies           



                                                  Muscular            



                                                  Dystrophy,           

 

           (unknown)  (no       (unknown)  (unknown)  Denies Maternal  (units   

 (unknown)



                    date)                         Metabolic unknown)  



                                                  Disorder (EG,TYPE           



                                                  1 Diabetes, PKU),           



                                                  Denies Patient or           

 

           (unknown)  (no       (unknown)  (unknown)  Denies Neural  (units    (

unknown)



                    date)                         Tube Defect unknown)  



                                                  (Meningomyelocele           



                                                  , Spina Bifida,           



                                                  or Anencephaly),           

 

           (unknown)  (no       (unknown)  (unknown)  Denies Sickle  (units    (

unknown)



                    date)                         Cell Disease or unknown)  



                                                  Trait (),           



                                                  Denies Hemophilia           



                                                  or other blood           

 

           (unknown)  (no       (unknown)  (unknown)  Denies [place]  (units  

  (unknown)



                    date)                         (Ashkenazi unknown)  



                                                  [employer], [place],           



                                                  [place]),           



                                                  Denies [place]           

 

           (unknown)  (no       (unknown)  (unknown)  Depression  (units    (unk

nown)



                    date)                         (-)   unknown)  

 

           (unknown)  (no       (unknown)  (unknown)  Depression:  (units    (un

known)



                    date)                         discussed unknown)  

 

           (unknown)  (no       (unknown)  (unknown)  Dept at   (units    (unkno

wn)



                    date)                         [phone]. unknown)  

 

           (unknown)  (no       (unknown)  (unknown)  Diet and  (units    (unkno

wn)



                    date)                         Exercise  unknown)  

 

           (unknown)  (no       (unknown)  (unknown)  Disease   (units    (unkno

wn)



                    date)                         (Ashkenazi unknown)  



                                                  Advent), Denies           



                                                  Familial            



                                                  Dysautonomia           



                                                  (Ashkenazi           



                                                  Advent),            

 

           (unknown)  (no       (unknown)  (unknown)  Documented By:  (units    

(unknown)



                    date)                         Julieta Au unknown)  



                                                  MD 23 1314           

 

           (unknown)  (no       (unknown)  (unknown)  Draft     (units    (unkno

wn)



                    date)                                   unknown)  

 

           (unknown)  (no       (unknown)  (unknown)  MEHNAZ Calculator  (units    

(unknown)



                    date)                                   unknown)  

 

           (unknown)  (no       (unknown)  (unknown)  EGA Weight BP  (units    (

unknown)



                    date)                         UGlucose  unknown)  

 

           (unknown)  (no       (unknown)  (unknown)  Eczema (-)  (units    

(unknown)



                    date)                                   unknown)  

 

           (unknown)  (no       (unknown)  (unknown)  Estimated  (units    (unkn

own)



                    date)                         Delivery Date unknown)  



                                                  Method Current           

 

           (unknown)  (no       (unknown)  (unknown)  Exercise and  (units    (u

nknown)



                    date)                         activity, unknown)  



                                                  work/environmenta           



                                                  l/hazards, Sexual           



                                                  activity, X-ray           

 

           (unknown)  (no       (unknown)  (unknown)  F/u in 4 wks.  (units    (

unknown)



                    date)                                   unknown)  

 

           (unknown)  (no       (unknown)  (unknown)  Family History  (units    

(unknown)



                    date)                         (Updated 09/10/22 unknown)  



                                                  @ 21:49 by Fatoumata Flores)             

 

           (unknown)  (no       (unknown)  (unknown)  Family/Other  (units    (u

nknown)



                    date)                         Multiple  unknown)  



                                                  sclerosis           

 

           (unknown)  (no       (unknown)  (unknown)  Father    (units    (unkno

wn)



                    date)                         Hypertension unknown)  

 

           (unknown)  (no       (unknown)  (unknown)  Father of Baby:  (units   

 (unknown)



                    date)                         same      unknown)  

 

           (unknown)  (no       (unknown)  (unknown)  [facility]  (units   

 (unknown)



                    date)                         Associates unknown)  

 

           (unknown)  (no       (unknown)  (unknown)  First Trimester  (units   

 (unknown)



                    date)                         Education unknown)  



                                                  Checklist           

 

           (unknown)  (no       (unknown)  (unknown)  Foot pain  (units    (unkn

own)



                    date)                         (-)   unknown)  

 

           (unknown)  (no       (unknown)  (unknown)   (SAB  (units    (unkn

own)



                    date)                         x7),Fertility Tx, unknown)  



                                                  meds only,           



                                                  started on baby           



                                                  aspirin             



                                                  10/11/2022           

 

           (unknown)  (no       (unknown)  (unknown)  Genetic   (units    (unkno

wn)



                    date)                         Screening + unknown)  



                                                  Counseling           

 

           (unknown)  (no       (unknown)  (unknown)  Genetic   (units    (unkno

wn)



                    date)                         Screening unknown)  

 

           (unknown)  (no       (unknown)  (unknown)  Grandfather  (units    (un

known)



                    date)                          Cancer unknown)  

 

           (unknown)  (no       (unknown)  (unknown)  Grandfather  (units    (un

known)



                    date)                          Stroke unknown)  

 

           (unknown)  (no       (unknown)  (unknown)  Grandmother  (units    (un

known)



                    date)                          History unknown)  



                                                  of heart disease           

 

           (unknown)  (no       (unknown)  (unknown)  Grandmother  (units    (un

known)



                    date)                          Multiple unknown)  



                                                  sclerosis           

 

           (unknown)  (no       (unknown)  (unknown)   8  (units    (unkn

own)



                    date)                         Multiple births 0 unknown)  

 

           (unknown)  (no       (unknown)  (unknown)  HIV risk  (units    (unkno

wn)



                    date)                         evaluation: low unknown)  



                                                  risk                

 

           (unknown)  (no       (unknown)  (unknown)  [facility]  (units    (

unknown)



                    date)                         Education unknown)  

 

           (unknown)  (no       (unknown)  (unknown)  Health center  (units    (

unknown)



                    date)                         information: unknown)  



                                                  nature of           



                                                  practice            



                                                  discussed,           



                                                  prenatal            



                                                  personnel           

 

           (unknown)  (no       (unknown)  (unknown)  Height 5 ft 6 in  (units  

  (unknown)



                    date)                                   unknown)  

 

           (unknown)  (no       (unknown)  (unknown)  Hepatitis C risk  (units  

  (unknown)



                    date)                         evaluation: low unknown)  



                                                  risk                

 

           (unknown)  (no       (unknown)  (unknown)  History of  (units    (unk

nown)



                    date)                         Hepatitis B: No unknown)  

 

           (unknown)  (no       (unknown)  (unknown)  History of  (units    (unk

nown)



                    date)                         Hepatitis C: No unknown)  

 

           (unknown)  (no       (unknown)  (unknown)  History of  (units    (unk

nown)



                    date)                         recurrent unknown)  



                                                  miscarriages           

 

           (unknown)  (no       (unknown)  (unknown)  Hospital:   (units    (u

nknown)



                    date)                                   unknown)  

 

           (unknown)  (no       (unknown)  (unknown)  [facility]  (units    (u

nknown)



                    date)                         Chorea, Denies unknown)  



                                                  Other inherited           



                                                  genetic or           



                                                  chromosomal           



                                                  disorder,           

 

           (unknown)  (no       (unknown)  (unknown)  , [place]  (units    (

unknown)



                    date)                         [place]  unknown)  

 

           (unknown)  (no       (unknown)  (unknown)  Hx #   (units    (u

nknown)



                    date)                         Pregnancies 0 unknown)  



                                                  Elective            



                                                  abortions 0           

 

           (unknown)  (no       (unknown)  (unknown)  Hx # Term  (units    (unkn

own)



                    date)                         Pregnancies 0 unknown)  



                                                  Ectopic             



                                                  pregnancies 0           

 

           (unknown)  (no       (unknown)  (unknown)  Infant will be  (units    

(unknown)



                    date)                         adopted?: no unknown)  

 

           (unknown)  (no       (unknown)  (unknown)  Infection  (units    (unkn

own)



                    date)                         History   unknown)  

 

           (unknown)  (no       (unknown)  (unknown)  Infectious  (units    (unk

nown)



                    date)                         Disease Education unknown)  

 

           (unknown)  (no       (unknown)  (unknown)  Infectious  (units    (unk

nown)



                    date)                         disease exposure: unknown)  



                                                  chicken pox           



                                                  immunity            



                                                  discussed,           



                                                  hepatitis risk           

 

           (unknown)  (no       (unknown)  (unknown)  Infertility  (units    (un

known)



                    date)                         ()   unknown)  

 

           (unknown)  (no       (unknown)  (unknown)  Initial Weight:  (units   

 (unknown)



                    date)                         155 lb    unknown)  

 

           (unknown)  (no       (unknown)  (unknown)  Initials  (units    (unkno

wn)



                    date)                                   unknown)  

 

           (unknown)  (no       (unknown)  (unknown)  Intake Clinical  (units   

 (unknown)



                    date)                         Staff     unknown)  

 

           (unknown)  (no       (unknown)  (unknown)  Intake Note:  (units    (u

nknown)



                    date)                                   unknown)  

 

           (unknown)  (no       (unknown)  (unknown)  Intake performed  (units  

  (unknown)



                    date)                         by:       unknown)  



                                                  Edelmira Montero           

 

           (unknown)  (no       (unknown)  (unknown)  Intake    (units    (unkno

wn)



                    date)                                   unknown)  

 

           (unknown)  (no       (unknown)  (unknown)  Irregular  (units    (unkn

own)



                    date)                         menstrual cycle unknown)  



                                                  ()             

 

           (unknown)  (no       (unknown)  (unknown)  LM        (units    (unkno

wn)



                    date)                                   unknown)  

 

           (unknown)  (no       (unknown)  (unknown)  Lipoma    (units    (unkno

wn)



                    date)                                   unknown)  

 

           (unknown)  (no       (unknown)  (unknown)  Live with  (units    (unkn

own)



                    date)                         someone with TB unknown)  



                                                  or exposed to TB:           



                                                  No                  

 

           (unknown)  (no       (unknown)  (unknown)  Loc: [facility]  (units    (unkno

wn)



                    date)                                   unknown)  

 

           (unknown)  (no       (unknown)  (unknown)  Marital status:  (units   

 (unknown)



                    date)                            unknown)  

 

           (unknown)  (no       (unknown)  (unknown)  Medical History  (units   

 (unknown)



                    date)                         (Updated 22 unknown)  



                                                  @ 15:16 by           



                                                  Julieta Au MD)                 

 

           (unknown)  (no       (unknown)  (unknown)  Medications  (units    (un

known)



                    date)                                   unknown)  

 

           (unknown)  (no       (unknown)  (unknown)  Mother Gastric  (units    

(unknown)



                    date)                         cancer    unknown)  

 

           (unknown)  (no       (unknown)  (unknown)  N No 142 13 N/A  (units   

 (unknown)



                    date)                         4wk       unknown)  

 

           (unknown)  (no       (unknown)  (unknown)  N No no 143 17  (units    

(unknown)



                    date)                         N/A absent AFP 4 unknown)  



                                                  wks                 

 

           (unknown)  (no       (unknown)  (unknown)  N No no 178 9  (units    (

unknown)



                    date)                         N/A absent unknown)  



                                                  long/closed AGA 9           

 

           (unknown)  (no       (unknown)  (unknown)  N Yes no 141 24  (units   

 (unknown)



                    date)                         N/A absent 4 wks unknown)  

 

           (unknown)  (no       (unknown)  (unknown)  N Yes no 142 20  (units   

 (unknown)



                    date)                         N/A absent AFP unknown)  



                                                  negative            

 

           (unknown)  (no       (unknown)  (unknown)  NF        (units    (unkno

wn)



                    date)                                   unknown)  

 

           (unknown)  (no       (unknown)  (unknown)  Notes     (units    (unkno

wn)



                    date)                                   unknown)  

 

           (unknown)  (no       (unknown)  (unknown)  Number of Living  (units  

  (unknown)



                    date)                         Children 0 unknown)  

 

           (unknown)  (no       (unknown)  (unknown)  Number of  (units    (unkn

own)



                    date)                         fetuses:: Single unknown)  

 

           (unknown)  (no       (unknown)  (unknown)  Nutrition and  (units    (

unknown)



                    date)                         weight gain unknown)  



                                                  counseling:           



                                                  special diet:           



                                                  discussed           

 

           (unknown)  (no       (unknown)  (unknown)  OB Office Visit  (units   

 (unknown)



                    date)                                   unknown)  

 

           (unknown)  (no       (unknown)  (unknown)  OB Visit Log  (units    (u

nknown)



                    date)                                   unknown)  

 

           (unknown)  (no       (unknown)  (unknown)  OB check  (units    (unkno

wn)



                    date)                                   unknown)  

 

           (unknown)  (no       (unknown)  (unknown)  On birth control  (units  

  (unknown)



                    date)                         at conception?: unknown)  



                                                  No                  

 

           (unknown)  (no       (unknown)  (unknown)  Other Estimates  (units   

 (unknown)



                    date)                         23 LMP unknown)  



                                                  (Certain) 31w 0d           

 

           (unknown)  (no       (unknown)  (unknown)  Ovarian cyst  (units    (u

nknown)



                    date)                         ()   unknown)  

 

           (unknown)  (no       (unknown)  (unknown)  PCOS (polycystic  (units  

  (unknown)



                    date)                         ovarian syndrome) unknown)  

 

           (unknown)  (no       (unknown)  (unknown)  PFSH      (units    (unkno

wn)



                    date)                                   unknown)  

 

           (unknown)  (no       (unknown)  (unknown)  Pap performed?:  (units   

 (unknown)



                    date)                         No        unknown)  

 

           (unknown)  (no       (unknown)  (unknown)  Para 0    (units    (unkno

wn)



                    date)                         Spontaneous unknown)  



                                                  abortions 7           

 

           (unknown)  (no       (unknown)  (unknown)  Partner history  (units   

 (unknown)



                    date)                         of STD: denies hx unknown)  

 

           (unknown)  (no       (unknown)  (unknown)  Partner history  (units   

 (unknown)



                    date)                         of genital unknown)  



                                                  herpes: No           

 

           (unknown)  (no       (unknown)  (unknown)  Partner: [place]  (units    (

unknown)



                    date)                         [place]  unknown)  

 

           (unknown)  (no       (unknown)  (unknown)  Patient comes in  (units  

  (unknown)



                    date)                         for follow-up OB unknown)  



                                                  visit. She had           



                                                  some pelvic pain           



                                                  that                

 

           (unknown)  (no       (unknown)  (unknown)  Patient presents  (units  

  (unknown)



                    date)                         for a new OB unknown)  



                                                  visit at 8 weeks           



                                                  gestation. She is           

 

           (unknown)  (no       (unknown)  (unknown)  Patient presents  (units  

  (unknown)



                    date)                         for a routine unknown)  



                                                  prenatal visit,           



                                                  accompanied by           



                                                  her .           

 

           (unknown)  (no       (unknown)  (unknown)  Patient's age 33  (units  

  (unknown)



                    date)                         years or older as unknown)  



                                                  of estimated date           



                                                  of delivery: No           

 

           (unknown)  (no       (unknown)  (unknown)  Patient:  (units    (unkno

wn)



                    date)                         Libra Prieto unknown)  



                                                  MR#: [id number]            

 

           (unknown)  (no       (unknown)  (unknown)  Pediatrician:  (units    (

unknown)



                    date)                         [facility] [place] vs unknown)  



                                                  [facility]           



                                                  [facility]           



                                                  [place]             

 

           (unknown)  (no       (unknown)  (unknown)  Personal history  (units  

  (unknown)



                    date)                         of STD: denies hx unknown)  

 

           (unknown)  (no       (unknown)  (unknown)  Personal history  (units  

  (unknown)



                    date)                         of genital unknown)  



                                                  herpes: No           

 

           (unknown)  (no       (unknown)  (unknown)  Plantar   (units    (unkno

wn)



                    date)                         fasciitis unknown)  

 

           (unknown)  (no       (unknown)  (unknown)  Position Sitting  (units  

  (unknown)



                    date)                                   unknown)  

 

           (unknown)  (no       (unknown)  (unknown)  Postpartum  (units    (unk

nown)



                    date)                         family    unknown)  



                                                  planning/Tubal           



                                                  sterilization:           



                                                  further             



                                                  discussion needed           

 

           (unknown)  (no       (unknown)  (unknown)  Pregnancy  (units    (unkn

own)



                    date)                         History   unknown)  

 

           (unknown)  (no       (unknown)  (unknown)  Pregnancy type::  (units  

  (unknown)



                    date)                         Other Normal unknown)  



                                                  Pregnancy           

 

           (unknown)  (no       (unknown)  (unknown)  Prenatal  (units    (unkno

wn)



                    date)                         Education unknown)  

 

           (unknown)  (no       (unknown)  (unknown)  Prenatal Initial  (units  

  (unknown)



                    date)                         Assessment unknown)  

 

           (unknown)  (no       (unknown)  (unknown)  Prenatal  (units    (unkno

wn)



                    date)                         Specific  unknown)  



                                                  Issues/Plans           

 

           (unknown)  (no       (unknown)  (unknown)  Prenatal  (units    (unkno

wn)



                    date)                         Testing:  unknown)  



                                                  discussed           

 

           (unknown)  (no       (unknown)  (unknown)  Prenatal Visit  (units    

(unknown)



                    date)                                   unknown)  

 

           (unknown)  (no       (unknown)  (unknown)  Prenatal  (units    (unkno

wn)



                    date)                         education packet: unknown)  



                                                  Child birth           



                                                  education/plan,           



                                                  Pregnancy           



                                                  symptoms,           

 

           (unknown)  (no       (unknown)  (unknown)  Primary Care  (units    (u

nknown)



                    date)                         Provider: [facility] [place] unknown)  



                                                  [place]              

 

           (unknown)  (no       (unknown)  (unknown)  Primary Ob  (units    (unk

nown)



                    date)                         Provider: unknown)  



                                                  Julieta Au           

 

           (unknown)  (no       (unknown)  (unknown)  Prior     (units    (unkno

wn)



                    date)                         GBS-Infected unknown)  



                                                  child: No           

 

           (unknown)  (no       (unknown)  (unknown)  Providers  (units    (unkn

own)



                    date)                                   unknown)  

 

           (unknown)  (no       (unknown)  (unknown)  Pt presents for  (units   

 (unknown)



                    date)                         a PNV at 16+6 unknown)  



                                                  wks. No FM. No           



                                                  LOF/VB. Rec'd flu           



                                                  shot                

 

           (unknown)  (no       (unknown)  (unknown)  Rash or viral  (units    (

unknown)



                    date)                         illness since unknown)  



                                                  last menstrual           



                                                  period: No           

 

           (unknown)  (no       (unknown)  (unknown)  Rash      (units    (unkno

wn)



                    date)                                   unknown)  

 

           (unknown)  (no       (unknown)  (unknown)  Reason For Visit  (units  

  (unknown)



                    date)                                   unknown)  

 

           (unknown)  (no       (unknown)  (unknown)  Recent travel  (units    (

unknown)



                    date)                         outside of unknown)  



                                                  country?: No           

 

           (unknown)  (no       (unknown)  (unknown)  Recurrent  (units    (unkn

own)



                    date)                         pregnancy loss or unknown)  



                                                  a stillbirth: Yes           

 

           (unknown)  (no       (unknown)  (unknown)  Reports over the  (units  

  (unknown)



                    date)                         counter   unknown)  



                                                  medications           



                                                  (diclofenac),           



                                                  Denies alcohol,           



                                                  Denies              

 

           (unknown)  (no       (unknown)  (unknown)  Safety    (units    (unkno

wn)



                    date)                                   unknown)  

 

           (unknown)  (no       (unknown)  (unknown)  Second Trimester  (units  

  (unknown)



                    date)                         Education unknown)  



                                                  Checklist           

 

           (unknown)  (no       (unknown)  (unknown)  Selecting a  (units    (un

known)



                    date)                          care unknown)  



                                                  provider: further           



                                                  discussion needed           

 

           (unknown)  (no       (unknown)  (unknown)  She is at 22  (units    (u

nknown)



                    date)                         weeks 6 days. She unknown)  



                                                  has felt good           



                                                  fetal movement.           



                                                  She says it is           

 

           (unknown)  (no       (unknown)  (unknown)  Shingles  (units    (unkno

wn)



                    date)                                   unknown)  

 

           (unknown)  (no       (unknown)  (unknown)  Signed By:  (units    (unk

nown)



                    date)                                   unknown)  

 

           (unknown)  (no       (unknown)  (unknown)  Signs and  (units    (unkn

own)



                    date)                         symptoms of unknown)  



                                                   labor:           



                                                  discussed           

 

           (unknown)  (no       (unknown)  (unknown)  Smoking Status:  (units   

 (unknown)



                    date)                         Never smoker unknown)  

 

           (unknown)  (no       (unknown)  (unknown)  Social History  (units    

(unknown)



                    date)                                   unknown)  

 

           (unknown)  (no       (unknown)  (unknown)  Support   (units    (unkno

wn)



                    date)                         Person(s):: [place] unknown)  

 

           (unknown)  (no       (unknown)  (unknown)  Surgical History  (units  

  (unknown)



                    date)                         (Updated 09/10/22 unknown)  



                                                  @ 21:46 by Fatoumata Flores)             

 

           (unknown)  (no       (unknown)  (unknown)  Surrogate  (units    (unkn

own)



                    date)                         pregnancy?: no unknown)  

 

           (unknown)  (no       (unknown)  (unknown)  Symptoms since  (units    

(unknown)



                    date)                         LMP: Reports unknown)  



                                                  amenorrhea,           



                                                  nausea, fatigue,           



                                                  breast              



                                                  tenderness,           

 

           (unknown)  (no       (unknown)  (unknown)  Teratogen  (units    (unkn

own)



                    date)                         Exposures since unknown)  



                                                  LMP/Conception:           



                                                  Denies              



                                                  prescription           



                                                  medications,           

 

           (unknown)  (no       (unknown)  (unknown)  Testing   (units    (unkno

wn)



                    date)                         Education unknown)  

 

           (unknown)  (no       (unknown)  (unknown)  Testing   (units    (unkno

wn)



                    date)                         education unknown)  



                                                  completed: group           



                                                  B strep, Spina           



                                                  bifida testing           



                                                  and Cell Free           

 

           (unknown)  (no       (unknown)  (unknown)  This note may  (units    (

unknown)



                    date)                         have been all or unknown)  



                                                  partially           



                                                  generated using           



                                                  voice recognition           

 

           (unknown)  (no       (unknown)  (unknown)  Tobacco +  (units    (unkn

own)



                    date)                         Substance Use unknown)  

 

           (unknown)  (no       (unknown)  (unknown)  Tobacco Status  (units    

(unknown)



                    date)                                   unknown)  

 

           (unknown)  (no       (unknown)  (unknown)  Trimester:: 3rd  (units   

 (unknown)



                    date)                         Trimester unknown)  



                                                  (28wks-Del)           

 

           (unknown)  (no       (unknown)  (unknown)  Tumor (-10/2012)  (units  

  (unknown)



                    date)                                   unknown)  

 

           (unknown)  (no       (unknown)  (unknown)  Type(s) of  (units    (unk

nown)



                    date)                         exercise: unknown)  



                                                  bicycling           



                                                  (stationary           



                                                  bike), regular           



                                                  exercise, weight           

 

           (unknown)  (no       (unknown)  (unknown) UProtein Movement  (units  

  (unknown)



                    date)                         PreLabor FHR Fndl unknown)  



                                                  Ht Pres Edema           



                                                  Cerv Exam           



                                                  US/Comment Next           



                                                  Appt                

 

           (unknown)  (no       (unknown)  (unknown)  Ultrasound  (units    (unk

nown)



                    date)                         performed?: Yes unknown)  

 

           (unknown)  (no       (unknown)  (unknown)  Varicella/chicke  (units  

  (unknown)



                    date)                         n pox status: unknown)  



                                                  previous disease           

 

           (unknown)  (no       (unknown)  (unknown)  Visit Date:  (units    (un

known)



                    date)                         23 Last unknown)  



                                                  Updated by:           



                                                  Julieta Au MD                  

 

           (unknown)  (no       (unknown)  (unknown)  Visit Date:  (units    (un

known)



                    date)                         22 Last unknown)  



                                                  Updated by:           



                                                  Julieta Au MD                  

 

           (unknown)  (no       (unknown)  (unknown)  Visit Date:  (units    (un

known)



                    date)                         10/11/22 Last unknown)  



                                                  Updated by:           



                                                  Fanny Baker MD                  

 

           (unknown)  (no       (unknown)  (unknown)  Visit Date:  (units    (un

known)



                    date)                         22 Last unknown)  



                                                  Updated by:           



                                                  Julieta Au MD                  

 

           (unknown)  (no       (unknown)  (unknown)  Visit Date:  (units    (un

known)



                    date)                         22 Last unknown)  



                                                  Updated by: Berta Salinas P.A-C           

 

           (unknown)  (no       (unknown)  (unknown)  Visit Reasons:  (units    

(unknown)



                    date)                         OB w 3D US unknown)  

 

           (unknown)  (no       (unknown)  (unknown)  Vitals    (units    (unkno

wn)



                    date)                                   unknown)  

 

           (unknown)  (no       (unknown)  (unknown)  Vitamins and  (units    (u

nknown)



                    date)                         iron, Diet and unknown)  



                                                  weight gain, Fish           



                                                  and mercury           



                                                  intake, Caffeine           



                                                  use,                

 

           (unknown)  (no       (unknown)  (unknown)  WG        (units    (unkno

wn)



                    date)                                   unknown)  

 

           (unknown)  (no       (unknown)  (unknown)  Weeks     (units    (unkno

wn)



                    date)                         gestation:: 28 unknown)  

 

           (unknown)  (no       (unknown)  (unknown)  Weight 177 lb  (units    (

unknown)



                    date)                                   unknown)  

 

           (unknown)  (no       (unknown)  (unknown)  [place] teeth  (units    (u

nknown)



                    date)                         extracted unknown)  

 

           (unknown)  (no       (unknown)  (unknown)  Zika virus  (units    (unk

nown)



                    date)                         exposure: No unknown)  

 

           (unknown)  (no       (unknown)  (unknown)  accompanied by  (units    

(unknown)



                    date)                         her . She unknown)  



                                                  went through           



                                                  fertility           



                                                  treatments with           

 

           (unknown)  (no       (unknown)  (unknown)  alcohol intake:  (units   

 (unknown)



                    date)                         never     unknown)  

 

           (unknown)  (no       (unknown)  (unknown)  anterior  (units    (unkno

wn)



                    date)                         placenta. Routine unknown)  



                                                  precautions           



                                                  reviewed with the           



                                                  patient. Due to           



                                                  1st                 

 

           (unknown)  (no       (unknown)  (unknown)  anyone in either  (units  

  (unknown)



                    date)                         family with: unknown)  

 

           (unknown)  (no       (unknown)  (unknown)  baby's father  (units    (

unknown)



                    date)                         had a child with unknown)  



                                                  birth defects not           



                                                  listed above and           



                                                  Denies Other           

 

           (unknown)  (no       (unknown)  (unknown)  back pain.  (units    (unk

nown)



                    date)                         Advised   unknown)  



                                                  stretching, belly           



                                                  band, and PT if           



                                                  not improving.           



                                                  AFP was             

 

           (unknown)  (no       (unknown)  (unknown)  blood sugar  (units    (un

known)



                    date)                         diagnostic (Blood unknown)  



                                                  Glucose Test           



                                                  strips) #100 ea           



                                                  23 [Rx           

 

           (unknown)  (no       (unknown)  (unknown)  blood-glucose  (units    (

unknown)



                    date)                         meter #1 ea unknown)  



                                                  23 [Rx           



                                                  Confirmed           



                                                  23]           

 

           (unknown)  (no       (unknown)  (unknown)  by ultrasound is  (units  

  (unknown)



                    date)                         normal with good unknown)  



                                                  fetal movement,           



                                                  normal appearing           



                                                  fluid,              

 

           (unknown)  (no       (unknown)  (unknown)  caffeine: Yes  (units    (

unknown)



                    date)                         (1-2 cups unknown)  



                                                  tea/day)            

 

           (unknown)  (no       (unknown)  (unknown)  carbon monox  (units    (u

nknown)



                    date)                         detector in home: unknown)  



                                                  Yes                 

 

           (unknown)  (no       (unknown)  (unknown)  cfDNA normal  (units    (u

nknown)



                    date)                         female, AFP unknown)  



                                                  negative            

 

           (unknown)  (no       (unknown)  (unknown)  consistent with  (units   

 (unknown)



                    date)                         9 weeks 0 days. unknown)  



                                                  Yolk sac visible.           



                                                  Fetal heart rate           



                                                  178 beats           

 

           (unknown)  (no       (unknown)  (unknown)  current   (units    (unkno

wn)



                    date)                         occupational unknown)  



                                                  exposures/hazards           



                                                  : No                

 

           (unknown)  (no       (unknown)  (unknown)  daily servings  (units    

(unknown)



                    date)                         fruits/ve-4 unknown)  

 

           (unknown)  (no       (unknown)  (unknown)  described, visit  (units  

  (unknown)



                    date)                         schedule  unknown)  



                                                  reviewed,           



                                                  ultrasounds           



                                                  policy reviewed,           



                                                  coverage 24           

 

           (unknown)  (no       (unknown)  (unknown)  developing a  (units    (u

nknown)



                    date)                         pattern. No unknown)  



                                                  leakage of fluid           



                                                  or vaginal           



                                                  bleeding. No           



                                                  contractions           

 

           (unknown)  (no       (unknown)  (unknown)  discussed,  (units    (unk

nown)



                    date)                         tuberculosis unknown)  



                                                  exposure            



                                                  discussed, CMV           



                                                  discussed,           



                                                  Toxoplasmosis           

 

           (unknown)  (no       (unknown)  (unknown)  disorders,  (units    (unk

nown)



                    date)                         Denies Cystic unknown)  



                                                  Fibrosis, Denies           



                                                  Mental              



                                                  Retardation/Autis           



                                                  m, Denies           

 

           (unknown)  (no       (unknown)  (unknown)  do you feel safe  (units  

  (unknown)



                    date)                         at home: Yes unknown)  

 

           (unknown)  (no       (unknown)  (unknown)  during the past  (units   

 (unknown)



                    date)                         year weight has: unknown)  



                                                  remained stable           

 

           (unknown)  (no       (unknown)  (unknown)  education level:  (units  

  (unknown)



                    date)                         college   unknown)  



                                                  (Associate's           



                                                  degree)             

 

           (unknown)  (no       (unknown)  (unknown)  exposure,  (units    (unkn

own)



                    date)                         Medication use, unknown)  



                                                  Sauna/hot tub           



                                                  use, Dental care,           



                                                  Travel and           



                                                  Influenza           

 

           (unknown)  (no       (unknown)  (unknown)  fire      (units    (unkno

wn)



                    date)                         extinguisher in unknown)  



                                                  home: Yes           

 

           (unknown)  (no       (unknown)  (unknown)  firearms in  (units    (un

known)



                    date)                         home: Yes unknown)  



                                                  firearms unloaded           



                                                  and locked: Yes           

 

           (unknown)  (no       (unknown)  (unknown)  frequency: 5-6  (units    

(unknown)



                    date)                         times per week unknown)  

 

           (unknown)  (no       (unknown)  (unknown)  gestational sac  (units   

 (unknown)



                    date)                         with a fetus with unknown)  



                                                  a crown-rump           



                                                  length measuring           



                                                  2.29 cm             

 

           (unknown)  (no       (unknown)  (unknown)  have occurred.  (units    

(unknown)



                    date)                         If there are any unknown)  



                                                  questions, please           



                                                  contact the           



                                                  Medical Records           

 

           (unknown)  (no       (unknown)  (unknown)  hours a day and  (units   

 (unknown)



                    date)                         participation of unknown)  



                                                  father in           



                                                  prenatal care and           



                                                  office visits           

 

           (unknown)  (no       (unknown)  (unknown)  household  (units    (unkn

own)



                    date)                         members: spouse unknown)  



                                                  and family           



                                                  (father and           



                                                  father-in-law)           

 

           (unknown)  (no       (unknown)  (unknown)  housing: house  (units    

(unknown)



                    date)                                   unknown)  

 

           (unknown)  (no       (unknown)  (unknown)  illicit drugs  (units    (

unknown)



                    date)                         and Denies other unknown)  

 

           (unknown)  (no       (unknown)  (unknown)  kag       (units    (unkno

wn)



                    date)                                   unknown)  

 

           (unknown)  (no       (unknown)  (unknown)  lancets #100 ea  (units   

 (unknown)



                    date)                         23 [Rx unknown)  



                                                  Confirmed           



                                                  23]           

 

           (unknown)  (no       (unknown)  (unknown)  last visit.  (units    (un

known)



                    date)                         Plan: AFP today. unknown)  



                                                  20 wk u/s           



                                                  reviewed. F/U 4           



                                                  wks. Warning           



                                                  signs               

 

           (unknown)  (no       (unknown)  (unknown)  lifting and  (units    (un

known)



                    date)                         other (hiking) unknown)  

 

           (unknown)  (no       (unknown)  (unknown)  lives     (units    (unkno

wn)



                    date)                         independently: unknown)  



                                                  Yes                 

 

           (unknown)  (no       (unknown)  (unknown)  marital status:  (units   

 (unknown)



                    date)                            unknown)  

 

           (unknown)  (no       (unknown)  (unknown)  may occur.  (units    (unk

nown)



                    date)                         Occasional unknown)  



                                                  wrong-word or           



                                                  'sound-alike'           



                                                  substitutions may           



                                                  have                

 

           (unknown)  (no       (unknown)  (unknown)  medication only.  (units  

  (unknown)



                    date)                         Had 7     unknown)  



                                                  miscarriages. No           



                                                  bleeding with           



                                                  this pregnancy.           



                                                  This 1              

 

           (unknown)  (no       (unknown)  (unknown)  movement and  (units    (u

nknown)



                    date)                         denies VB, LOF, unknown)  



                                                  cramping and           



                                                  regular             



                                                  contractions. Pt           



                                                  reports low           

 

           (unknown)  (no       (unknown)  (unknown)  negative.  (units    (unkn

own)



                    date)                         Anatomy US unknown)  



                                                  scheduled for           



                                                  later today.           



                                                  Warning             



                                                  precautions           



                                                  reviewed.           

 

           (unknown)  (no       (unknown)  (unknown)  nickel Allergy  (units    

(unknown)



                    date)                         (Mild, Verified unknown)  



                                                  23 13:15)           

 

           (unknown)  (no       (unknown)  (unknown)  number of  (units    (unkn

own)



                    date)                         children: 0 unknown)  

 

           (unknown)  (no       (unknown)  (unknown)  occupational  (units    (u

nknown)



                    date)                         status: employed unknown)  



                                                  (active duty           



                                                  avionics            



                                                  ,           



                                                  [employer] job            

 

           (unknown)  (no       (unknown)  (unknown)  occurred due to  (units   

 (unknown)



                    date)                         the inherent unknown)  



                                                  limitations of           



                                                  voice recognition           



                                                  software. Please           

 

           (unknown)  (no       (unknown)  (unknown)  or cramping.  (units    (u

nknown)



                    date)                         Plan: 1 hour unknown)  



                                                  glucose ordered.           



                                                  Follow-up in 4           



                                                  weeks. Warning           

 

           (unknown)  (no       (unknown)  (unknown)  per minute.  (units    (un

known)



                    date)                         Normal ovaries unknown)  



                                                  bilaterally.           



                                                  Plan: Follow-up           



                                                  in 4 weeks.           



                                                  Warning             

 

           (unknown)  (no       (unknown)  (unknown)  pets and  (units    (unkno

wn)



                    date)                         animals: Yes (1 unknown)  



                                                  large dog,           



                                                  chickens)           

 

           (unknown)  (no       (unknown)  (unknown)  precautions,  (units    (u

nknown)



                    date)                         Listeriosis unknown)  



                                                  prevention and           



                                                  Rubella             



                                                  Immunization           

 

           (unknown)  (no       (unknown)  (unknown)  preeclampsia.  (units    (

unknown)



                    date)                                   unknown)  

 

           (unknown)  (no       (unknown)  (unknown)  pregnancy  (units    (unkn

own)



                    date)                         discussed unknown)  



                                                  starting baby           



                                                  aspirin per day           



                                                  to decrease her           



                                                  risk for            

 

           (unknown)  (no       (unknown)  (unknown)  prenat.vits,nan,  (units  

  (unknown)



                    date)                         min-iron-folic 1 unknown)  



                                                  tab PO DAILY           



                                                  22 [History           



                                                  Confirmed           

 

           (unknown)  (no       (unknown)  (unknown)  read the note  (units    (

unknown)



                    date)                         carefully and unknown)  



                                                  recognize, using           



                                                  context, where           



                                                  these               



                                                  substitutions           

 

           (unknown)  (no       (unknown)  (unknown)  reviewed.  (units    (unkn

own)



                    date)                                   unknown)  

 

           (unknown)  (no       (unknown)  (unknown)  seatbelt use:  (units    (

unknown)



                    date)                         always    unknown)  

 

           (unknown)  (no       (unknown)  (unknown)  second hand  (units    (un

known)



                    date)                         exposure: Yes unknown)  



                                                  (father-in-law           



                                                  smokes outside           



                                                  the house)           

 

           (unknown)  (no       (unknown)  (unknown)  signs reviewed.  (units   

 (unknown)



                    date)                         cfDNA ordered. unknown)  

 

           (unknown)  (no       (unknown)  (unknown)  signs reviewed.  (units   

 (unknown)



                    date)                                   unknown)  

 

           (unknown)  (no       (unknown)  (unknown)  software.  (units    (unkn

own)



                    date)                         Although every unknown)  



                                                  effort is made to           



                                                  edit content,           



                                                  transcription           



                                                  errors              

 

           (unknown)  (no       (unknown)  (unknown)  special madi  (units    (

unknown)



                    date)                         needs: No unknown)  

 

           (unknown)  (no       (unknown)  (unknown)  substance use  (units    (

unknown)



                    date)                         type: does not unknown)  



                                                  use                 

 

           (unknown)  (no       (unknown)  (unknown)  travel history:  (units   

 (unknown)



                    date)                         over 6 months ago unknown)  

 

           (unknown)  (no       (unknown)  (unknown)  urinary   (units    (unkno

wn)



                    date)                         frequency and unknown)  



                                                  irritability           

 

           (unknown)  (no       (unknown)  (unknown)  vaccine (Annual  (units   

 (unknown)



                    date)                         flu, Phizer Covid unknown)  



                                                  x2)                 

 

           (unknown)  (no       (unknown)  (unknown)  w0d 4 wks  (units    (unkn

own)



                    date)                                   unknown)  

 

           (unknown)  (no       (unknown)  (unknown)  was not using  (units    (

unknown)



                    date)                         any infertility unknown)  



                                                  medications.           



                                                  Ultrasound: An           



                                                  intrauterine           

 

           (unknown)  (no       (unknown)  (unknown)  water heater  (units    (u

nknown)



                    date)                         temp set < 120 unknown)  



                                                  deg: Yes            

 

           (unknown)  (no       (unknown)  (unknown)  well-balanced  (units    (

unknown)



                    date)                         diet: daily or unknown)  



                                                  most days           

 

           (unknown)  (no       (unknown)  (unknown)  went away with  (units    

(unknown)



                    date)                         laying down. No unknown)  



                                                  vaginal bleeding.           



                                                  No fevers. Fetal           



                                                  heart tone           

 

           (unknown)  (no       (unknown)  (unknown)  while pregnant)  (units   

 (unknown)



                    date)                                   unknown)  

 

           (unknown)  (no       (unknown)  (unknown)  working smoke  (units    (

unknown)



                    date)                         detector in home: unknown)  



                                                  Yes                 









                                         Result panel 14









           (unknown)  (no       (unknown)  (unknown)  (no value)  (units    (unk

nown)



                    date)                                   unknown)  

 

           (unknown)  (no       (unknown)  (unknown)  (+12 lb) 120/58  (units   

 (unknown)



                    date)                         N         unknown)  

 

           (unknown)  (no       (unknown)  (unknown)  (+13 lb) 104/54  (units   

 (unknown)



                    date)                         N         unknown)  

 

           (unknown)  (no       (unknown)  (unknown)  (+18 lb) 122/60  (units   

 (unknown)



                    date)                         N         unknown)  

 

           (unknown)  (no       (unknown)  (unknown)  (+7 lb) 128/66 N  (units  

  (unknown)



                    date)                                   unknown)  

 

           (unknown)  (no       (unknown)  (unknown)  (+8 lb) 122/68 N  (units  

  (unknown)



                    date)                                   unknown)  

 

           (unknown)  (no       (unknown)  (unknown)  **Genetic  (units    (unkn

own)



                    date)                         Screening/Teratol unknown)  



                                                  ogy Counseling -           



                                                  Includes patient,           



                                                  baby's father, or           

 

           (unknown)  (no       (unknown)  (unknown)  -?-?-?-?-?-?-?-?  (units  

  (unknown)



                    date)                         -?-?-?-?  unknown)  

 

           (unknown)  (no       (unknown)  (unknown)  0.5 mL IM Right  (units   

 (unknown)



                    date)                         Deltoid [id number] unknown)  



                                                  24            



                                                  [id number]           



                                                  SAN[employer]           

 

           (unknown)  (no       (unknown)  (unknown)  23  (units    (unkno

wn)



                    date)                                   unknown)  

 

           (unknown)  (no       (unknown)  (unknown)  23 Single  (units   

 (unknown)



                    date)                         Vaccine 21 unknown)  

 

           (unknown)  (no       (unknown)  (unknown)  23  (units    (unkno

wn)



                    date)                                   unknown)  

 

           (unknown)  (no       (unknown)  (unknown)  23]  (units    (unkn

own)



                    date)                                   unknown)  

 

           (unknown)  (no       (unknown)  (unknown)  04/15/23  (units    (unkno

wn)



                    date)                         Ultrasound #1 29w unknown)  



                                                  3d                  

 

           (unknown)  (no       (unknown)  (unknown)  [id number]   (units    (unkno

wn)



                    date)                                   unknown)  

 

           (unknown)  (no       (unknown)  (unknown)  22  (units    (unkno

wn)



                    date)                                   unknown)  

 

           (unknown)  (no       (unknown)  (unknown)  10/11/22  (units    (unkno

wn)



                    date)                                   unknown)  

 

           (unknown)  (no       (unknown)  (unknown)  22  (units    (unkno

wn)



                    date)                                   unknown)  

 

           (unknown)  (no       (unknown)  (unknown)  22  (units    (unkno

wn)



                    date)                                   unknown)  

 

           (unknown)  (no       (unknown)  (unknown)  12w 6d 163 lb  (units    (

unknown)



                    date)                                   unknown)  

 

           (unknown)  (no       (unknown)  (unknown)  13:15     (units    (unkno

wn)



                    date)                                   unknown)  

 

           (unknown)  (no       (unknown)  (unknown)  16w 6d 167 lb  (units    (

unknown)



                    date)                                   unknown)  

 

           (unknown)  (no       (unknown)  (unknown)  20w 5d 168 lb  (units    (

unknown)



                    date)                                   unknown)  

 

           (unknown)  (no       (unknown)  (unknown)  24w 6d 173 lb  (units    (

unknown)



                    date)                                   unknown)  

 

           (unknown)  (no       (unknown)  (unknown)  4 wk      (units    (unkno

wn)



                    date)                                   unknown)  

 

           (unknown)  (no       (unknown)  (unknown)  8w 6d 162 lb  (units    (u

nknown)



                    date)                                   unknown)  

 

           (unknown)  (no       (unknown)  (unknown)  Abnormal lab  (units    (u

nknown)



                    date)                         values 1st unknown)  



                                                  trimester:           



                                                  discussed           

 

           (unknown)  (no       (unknown)  (unknown)  Abnormal lab  (units    (u

nknown)



                    date)                         values 2nd unknown)  



                                                  trimester:           



                                                  discussed           

 

           (unknown)  (no       (unknown)  (unknown)  Adacel(Tdap  (units    (un

known)



                    date)                         Adolesn/Adult)(PF unknown)  



                                                  )                   

 

           (unknown)  (no       (unknown)  (unknown)  Add'l Plan  (units    (unk

nown)



                    date)                         Details   unknown)  

 

           (unknown)  (no       (unknown)  (unknown)  Administered by:  (units  

  (unknown)



                    date)                         Edelmira Montero, unknown)  



                                                  MA on 23           



                                                  13:25               

 

           (unknown)  (no       (unknown)  (unknown)  Age/Sex: 27 / F  (units   

 (unknown)



                    date)                         Date of Service: unknown)  

 

           (unknown)  (no       (unknown)  (unknown)  Allergies  (units    (unkn

own)



                    date)                         (-)   unknown)  

 

           (unknown)  (no       (unknown)  (unknown)  Allergies  (units    (unkn

own)



                    date)                                   unknown)  

 

           (unknown)  (no       (unknown)  (unknown)  MalintaSARINA anne  (units    (

unknown)



                    date)                         [id number]     unknown)  

 

           (unknown)  (no       (unknown)  (unknown)  Anesthesia  (units    (unk

nown)



                    date)                                   unknown)  

 

           (unknown)  (no       (unknown)  (unknown)  Aneuploidy  (units    (unk

nown)



                    date)                         Screening unknown)  



                                                  Offered: Accepted           



                                                  (wants CFDNA)           

 

           (unknown)  (no       (unknown)  (unknown)  Anticipated  (units    (un

known)



                    date)                         course of unknown)  



                                                  prenatal care:           



                                                  discussed           

 

           (unknown)  (no       (unknown)  (unknown)  Anxiety (-)  (units   

 (unknown)



                    date)                                   unknown)  

 

           (unknown)  (no       (unknown)  (unknown)  Arrhythmia  (units    (unk

nown)



                    date)                                   unknown)  

 

           (unknown)  (no       (unknown)  (unknown)  Assessment and  (units    

(unknown)



                    date)                         Plan      unknown)  

 

           (unknown)  (no       (unknown)  (unknown)  Attending Dr:  (units    (

unknown)



                    date)                         Julieta Au unknown)  



                                                  MD                  

 

           (unknown)  (no       (unknown)  (unknown)  Libra presents  (units   

 (unknown)



                    date)                         today for routine unknown)  



                                                  OB f/u at 20w5d.           



                                                  She reports good           



                                                  fetal               

 

           (unknown)  (no       (unknown)  (unknown)  BMI 28.5  (units    (unkno

wn)



                    date)                                   unknown)  

 

           (unknown)  (no       (unknown)  (unknown)  /62  (units    (unkn

own)



                    date)                                   unknown)  

 

           (unknown)  (no       (unknown)  (unknown)  Birth     (units    (unkno

wn)



                    date)                         Plan/Preferences unknown)  

 

           (unknown)  (no       (unknown)  (unknown)  Birth Planning  (units    

(unknown)



                    date)                                   unknown)  

 

           (unknown)  (no       (unknown)  (unknown)  Blood Pressure  (units    

(unknown)



                    date)                         Location Lt unknown)  



                                                  brachial            

 

           (unknown)  (no       (unknown)  (unknown)  Blood     (units    (unkno

wn)



                    date)                         transfusions?: unknown)  



                                                  yes (Never had           



                                                  but would accept)           

 

           (unknown)  (no       (unknown)  (unknown)  Breastfeeding:  (units    

(unknown)



                    date)                         discussed unknown)  

 

           (unknown)  (no       (unknown)  (unknown)  Chicken pox  (units    (un

known)



                    date)                         ()   unknown)  

 

           (unknown)  (no       (unknown)  (unknown)  Childbirth  (units    (unk

nown)



                    date)                         Classes:  unknown)  



                                                  discussed           

 

           (unknown)  (no       (unknown)  (unknown)  Conceived  (units    (unkn

own)



                    date)                         naturally this unknown)  



                                                  pregnancy           

 

           (unknown)  (no       (unknown)  (unknown)  Confirmed  (units    (unkn

own)



                    date)                         23] unknown)  

 

           (unknown)  (no       (unknown)  (unknown)  Current Estimate  (units  

  (unknown)



                    date)                         23  unknown)  



                                                  Ultrasound #2 28w           



                                                  6d                  

 

           (unknown)  (no       (unknown)  (unknown)  Current   (units    (unkno

wn)



                    date)                         Pregnancy History unknown)  

 

           (unknown)  (no       (unknown)  (unknown)  DNA       (units    (unkno

wn)



                    date)                                   unknown)  

 

           (unknown)  (no       (unknown)  (unknown)  : 1995  (units   

 (unknown)



                    date)                         Acct:[id number] unknown)  

 

           (unknown)  (no       (unknown)  (unknown)  Date of positive  (units  

  (unknown)



                    date)                         home pregnancy unknown)  



                                                  test: 08/15/22           

 

           (unknown)  (no       (unknown)  (unknown)  Date      (units    (unkno

wn)



                    date)                                   unknown)  

 

           (unknown)  (no       (unknown)  (unknown) Denies Congenital  (units  

  (unknown)



                    date)                         Heart Defect, unknown)  



                                                  Denies Down           



                                                  Syndrome, Denies           



                                                  Muscular            



                                                  Dystrophy,           

 

           (unknown)  (no       (unknown)  (unknown)  Denies Maternal  (units   

 (unknown)



                    date)                         Metabolic unknown)  



                                                  Disorder (EG,TYPE           



                                                  1 Diabetes, PKU),           



                                                  Denies Patient or           

 

           (unknown)  (no       (unknown)  (unknown)  Denies Neural  (units    (

unknown)



                    date)                         Tube Defect unknown)  



                                                  (Meningomyelocele           



                                                  , Spina Bifida,           



                                                  or Anencephaly),           

 

           (unknown)  (no       (unknown)  (unknown)  Denies Sickle  (units    (

unknown)



                    date)                         Cell Disease or unknown)  



                                                  Trait (),           



                                                  Denies Hemophilia           



                                                  or other blood           

 

           (unknown)  (no       (unknown)  (unknown)  Denies [place]  (units  

  (unknown)



                    date)                         (Ashkenazi unknown)  



                                                  [employer], [place],           



                                                  [place]),           



                                                  Denies [place]           

 

           (unknown)  (no       (unknown)  (unknown)  Depression  (units    (unk

nown)



                    date)                         (-2016)   unknown)  

 

           (unknown)  (no       (unknown)  (unknown)  Depression:  (units    (un

known)



                    date)                         discussed unknown)  

 

           (unknown)  (no       (unknown)  (unknown)  Dept at   (units    (unkno

wn)



                    date)                         [phone]. unknown)  

 

           (unknown)  (no       (unknown)  (unknown)  Diet and  (units    (unkno

wn)



                    date)                         Exercise  unknown)  

 

           (unknown)  (no       (unknown)  (unknown)  Disease   (units    (unkno

wn)



                    date)                         (Ashkenazi unknown)  



                                                  Advent), Denies           



                                                  Familial            



                                                  Dysautonomia           



                                                  ([facility]           



                                                  [facility]),            

 

           (unknown)  (no       (unknown)  (unknown)  Documented By:  (units    

(unknown)



                    date)                         Julieta Au unknown)  



                                                  MD 23 1314           

 

           (unknown)  (no       (unknown)  (unknown)  Dose Route Admin  (units  

  (unknown)



                    date)                         Location Lot unknown)  



                                                  Number Expiration           



                                                  Date NDC            

 

           (unknown)  (no       (unknown)  (unknown)  Draft     (units    (unkno

wn)



                    date)                                   unknown)  

 

           (unknown)  (no       (unknown)  (unknown)  MEHNAZ Calculator  (units    

(unknown)



                    date)                                   unknown)  

 

           (unknown)  (no       (unknown)  (unknown)  EGA Weight BP  (units    (

unknown)



                    date)                         UGlucose  unknown)  

 

           (unknown)  (no       (unknown)  (unknown)  Eczema (-2000)  (units    

(unknown)



                    date)                                   unknown)  

 

           (unknown)  (no       (unknown)  (unknown)  Eligibility  (units    (un

known)



                    date)                         Eligibility Date unknown)  



                                                  Funding Source           

 

           (unknown)  (no       (unknown)  (unknown)  Estimated  (units    (unkn

own)



                    date)                         Delivery Date unknown)  



                                                  Method Current           

 

           (unknown)  (no       (unknown)  (unknown)  Exercise and  (units    (u

nknown)



                    date)                         activity, unknown)  



                                                  work/environmenta           



                                                  l/hazards, Sexual           



                                                  activity, X-ray           

 

           (unknown)  (no       (unknown)  (unknown)  F/u in 4 wks.  (units    (

unknown)



                    date)                                   unknown)  

 

           (unknown)  (no       (unknown)  (unknown)  Family History  (units    

(unknown)



                    date)                         (Updated 09/10/22 unknown)  



                                                  @ 21:49 by Fatoumata Flores)             

 

           (unknown)  (no       (unknown)  (unknown)  Family/Other  (units    (u

nknown)



                    date)                         Multiple  unknown)  



                                                  sclerosis           

 

           (unknown)  (no       (unknown)  (unknown)  Father    (units    (unkno

wn)



                    date)                         Hypertension unknown)  

 

           (unknown)  (no       (unknown)  (unknown)  Father of Baby:  (units   

 (unknown)



                    date)                         same      unknown)  

 

           (unknown)  (no       (unknown)  (unknown)  [facility]  (units   

 (unknown)



                    date)                         Associates unknown)  

 

           (unknown)  (no       (unknown)  (unknown)  First Trimester  (units   

 (unknown)



                    date)                         Education unknown)  



                                                  Checklist           

 

           (unknown)  (no       (unknown)  (unknown)  Foot pain  (units    (unkn

own)



                    date)                         ()   unknown)  

 

           (unknown)  (no       (unknown)  (unknown)   (SAB  (units    (unkn

own)



                    date)                         x7),Fertility Tx, unknown)  



                                                  meds only,           



                                                  started on baby           



                                                  aspirin             



                                                  10/11/2022           

 

           (unknown)  (no       (unknown)  (unknown)  Genetic   (units    (unkno

wn)



                    date)                         Screening + unknown)  



                                                  Counseling           

 

           (unknown)  (no       (unknown)  (unknown)  Genetic   (units    (unkno

wn)



                    date)                         Screening unknown)  

 

           (unknown)  (no       (unknown)  (unknown)  Grandfather  (units    (un

known)



                    date)                          Cancer unknown)  

 

           (unknown)  (no       (unknown)  (unknown)  Grandfather  (units    (un

known)



                    date)                          Stroke unknown)  

 

           (unknown)  (no       (unknown)  (unknown)  Grandmother  (units    (un

known)



                    date)                          History unknown)  



                                                  of heart disease           

 

           (unknown)  (no       (unknown)  (unknown)  Grandmother  (units    (un

known)



                    date)                          Multiple unknown)  



                                                  sclerosis           

 

           (unknown)  (no       (unknown)  (unknown)   8  (units    (unkn

own)



                    date)                         Multiple births 0 unknown)  

 

           (unknown)  (no       (unknown)  (unknown)  HIV risk  (units    (unkno

wn)



                    date)                         evaluation: low unknown)  



                                                  risk                

 

           (unknown)  (no       (unknown)  (unknown)  [facility]  (units    (

unknown)



                    date)                         Education unknown)  

 

           (unknown)  (no       (unknown)  (unknown)  Health center  (units    (

unknown)



                    date)                         information: unknown)  



                                                  nature of           



                                                  practice            



                                                  discussed,           



                                                  prenatal            



                                                  personnel           

 

           (unknown)  (no       (unknown)  (unknown)  Height 5 ft 6 in  (units  

  (unknown)



                    date)                                   unknown)  

 

           (unknown)  (no       (unknown)  (unknown)  Hepatitis C risk  (units  

  (unknown)



                    date)                         evaluation: low unknown)  



                                                  risk                

 

           (unknown)  (no       (unknown)  (unknown)  History of  (units    (unk

nown)



                    date)                         Hepatitis B: No unknown)  

 

           (unknown)  (no       (unknown)  (unknown)  History of  (units    (unk

nown)



                    date)                         Hepatitis C: No unknown)  

 

           (unknown)  (no       (unknown)  (unknown)  History of  (units    (unk

nown)



                    date)                         recurrent unknown)  



                                                  miscarriages           

 

           (unknown)  (no       (unknown)  (unknown)  [facility]  (units    (u

nknown)



                    date)                                   unknown)  

 

           (unknown)  (no       (unknown)  (unknown)  [facility]  (units    (u

nknown)



                    date)                         Chorea, Denies unknown)  



                                                  Other inherited           



                                                  genetic or           



                                                  chromosomal           



                                                  disorder,           

 

           (unknown)  (no       (unknown)  (unknown)  , [place]  (units    (

unknown)



                    date)                         [place]  unknown)  

 

           (unknown)  (no       (unknown)  (unknown)  Hx #   (units    (u

nknown)



                    date)                         Pregnancies 0 unknown)  



                                                  Elective            



                                                  abortions 0           

 

           (unknown)  (no       (unknown)  (unknown)  Hx # Term  (units    (unkn

own)



                    date)                         Pregnancies 0 unknown)  



                                                  Ectopic             



                                                  pregnancies 0           

 

           (unknown)  (no       (unknown)  (unknown)  Immunizations  (units    (

unknown)



                    date)                                   unknown)  

 

           (unknown)  (no       (unknown)  (unknown)  Infant will be  (units    

(unknown)



                    date)                         adopted?: no unknown)  

 

           (unknown)  (no       (unknown)  (unknown)  Infection  (units    (unkn

own)



                    date)                         History   unknown)  

 

           (unknown)  (no       (unknown)  (unknown)  Infectious  (units    (unk

nown)



                    date)                         Disease Education unknown)  

 

           (unknown)  (no       (unknown)  (unknown)  Infectious  (units    (unk

nown)



                    date)                         disease exposure: unknown)  



                                                  chicken pox           



                                                  immunity            



                                                  discussed,           



                                                  hepatitis risk           

 

           (unknown)  (no       (unknown)  (unknown)  Infertility  (units    (un

known)



                    date)                         ()   unknown)  

 

           (unknown)  (no       (unknown)  (unknown)  Initial Weight:  (units   

 (unknown)



                    date)                         155 lb    unknown)  

 

           (unknown)  (no       (unknown)  (unknown)  Initials  (units    (unkno

wn)



                    date)                                   unknown)  

 

           (unknown)  (no       (unknown)  (unknown)  Intake Clinical  (units   

 (unknown)



                    date)                         Staff     unknown)  

 

           (unknown)  (no       (unknown)  (unknown)  Intake Note:  (units    (u

nknown)



                    date)                                   unknown)  

 

           (unknown)  (no       (unknown)  (unknown)  Intake performed  (units  

  (unknown)



                    date)                         by:       unknown)  



                                                  Edelmira Montero           

 

           (unknown)  (no       (unknown)  (unknown)  Intake    (units    (unkno

wn)



                    date)                                   unknown)  

 

           (unknown)  (no       (unknown)  (unknown)  Irregular  (units    (unkn

own)



                    date)                         menstrual cycle unknown)  



                                                  ()             

 

           (unknown)  (no       (unknown)  (unknown)  LM        (units    (unkno

wn)



                    date)                                   unknown)  

 

           (unknown)  (no       (unknown)  (unknown)  Lipoma    (units    (unkno

wn)



                    date)                                   unknown)  

 

           (unknown)  (no       (unknown)  (unknown)  Live with  (units    (unkn

own)



                    date)                         someone with TB unknown)  



                                                  or exposed to TB:           



                                                  No                  

 

           (unknown)  (no       (unknown)  (unknown)  Loc: [facility]  (units    (unkno

wn)



                    date)                                   unknown)  

 

           (unknown)  (no       (unknown)  (unknown)    (units    (u

nknown)



                    date)                                   unknown)  

 

           (unknown)  (no       (unknown)  (unknown)  Marital status:  (units   

 (unknown)



                    date)                            unknown)  

 

           (unknown)  (no       (unknown)  (unknown)  Medical History  (units   

 (unknown)



                    date)                         (Updated 22 unknown)  



                                                  @ 15:16 by           



                                                  Julieta Au MD)                 

 

           (unknown)  (no       (unknown)  (unknown)  Medications  (units    (un

known)



                    date)                                   unknown)  

 

           (unknown)  (no       (unknown)  (unknown)  Mother Gastric  (units    

(unknown)



                    date)                         cancer    unknown)  

 

           (unknown)  (no       (unknown)  (unknown)  N No 142 13 N/A  (units   

 (unknown)



                    date)                         4wk       unknown)  

 

           (unknown)  (no       (unknown)  (unknown)  N No no 143 17  (units    

(unknown)



                    date)                         N/A absent AFP 4 unknown)  



                                                  wks                 

 

           (unknown)  (no       (unknown)  (unknown)  N No no 178 9  (units    (

unknown)



                    date)                         N/A absent unknown)  



                                                  long/closed AGA 9           

 

           (unknown)  (no       (unknown)  (unknown)  N Yes no 141 24  (units   

 (unknown)



                    date)                         N/A absent 4 wks unknown)  

 

           (unknown)  (no       (unknown)  (unknown)  N Yes no 142 20  (units   

 (unknown)



                    date)                         N/A absent AFP unknown)  



                                                  negative            

 

           (unknown)  (no       (unknown)  (unknown)  NF        (units    (unkno

wn)



                    date)                                   unknown)  

 

           (unknown)  (no       (unknown)  (unknown)  Not [facility] Eligible  (units  

  (unknown)



                    date)                         23 Private unknown)  



                                                  Funds               

 

           (unknown)  (no       (unknown)  (unknown)  Notes     (units    (unkno

wn)



                    date)                                   unknown)  

 

           (unknown)  (no       (unknown)  (unknown)  Number of Living  (units  

  (unknown)



                    date)                         Children 0 unknown)  

 

           (unknown)  (no       (unknown)  (unknown)  Number of  (units    (unkn

own)



                    date)                         fetuses:: Single unknown)  

 

           (unknown)  (no       (unknown)  (unknown)  Nutrition and  (units    (

unknown)



                    date)                         weight gain unknown)  



                                                  counseling:           



                                                  special diet:           



                                                  discussed           

 

           (unknown)  (no       (unknown)  (unknown)  OB Office Visit  (units   

 (unknown)



                    date)                                   unknown)  

 

           (unknown)  (no       (unknown)  (unknown)  OB Visit Log  (units    (u

nknown)



                    date)                                   unknown)  

 

           (unknown)  (no       (unknown)  (unknown)  OB check  (units    (unkno

wn)



                    date)                                   unknown)  

 

           (unknown)  (no       (unknown)  (unknown)  On birth control  (units  

  (unknown)



                    date)                         at conception?: unknown)  



                                                  No                  

 

           (unknown)  (no       (unknown)  (unknown)  Orders    (units    (unkno

wn)



                    date)                                   unknown)  

 

           (unknown)  (no       (unknown)  (unknown)  Orders:   (units    (unkno

wn)



                    date)                                   unknown)  

 

           (unknown)  (no       (unknown)  (unknown)  Other Estimates  (units   

 (unknown)



                    date)                         23 LMP unknown)  



                                                  (Certain) 31w 0d           

 

           (unknown)  (no       (unknown)  (unknown)  Ovarian cyst  (units    (u

nknown)



                    date)                         ()   unknown)  

 

           (unknown)  (no       (unknown)  (unknown)  PCOS (polycystic  (units  

  (unknown)



                    date)                         ovarian syndrome) unknown)  

 

           (unknown)  (no       (unknown)  (unknown)  PFSH      (units    (unkno

wn)



                    date)                                   unknown)  

 

           (unknown)  (no       (unknown)  (unknown)  Pap performed?:  (units   

 (unknown)



                    date)                         No        unknown)  

 

           (unknown)  (no       (unknown)  (unknown)  Para 0    (units    (unkno

wn)



                    date)                         Spontaneous unknown)  



                                                  abortions 7           

 

           (unknown)  (no       (unknown)  (unknown)  Partner history  (units   

 (unknown)



                    date)                         of STD: denies hx unknown)  

 

           (unknown)  (no       (unknown)  (unknown)  Partner history  (units   

 (unknown)



                    date)                         of genital unknown)  



                                                  herpes: No           

 

           (unknown)  (no       (unknown)  (unknown)  Partner: [place]  (units    (

unknown)



                    date)                         [place]  unknown)  

 

           (unknown)  (no       (unknown)  (unknown)  Patient comes in  (units  

  (unknown)



                    date)                         for follow-up OB unknown)  



                                                  visit. She had           



                                                  some pelvic pain           



                                                  that                

 

           (unknown)  (no       (unknown)  (unknown)  Patient presents  (units  

  (unknown)



                    date)                         for a new OB unknown)  



                                                  visit at 8 weeks           



                                                  gestation. She is           

 

           (unknown)  (no       (unknown)  (unknown)  Patient presents  (units  

  (unknown)



                    date)                         for a routine unknown)  



                                                  prenatal visit,           



                                                  accompanied by           



                                                  her .           

 

           (unknown)  (no       (unknown)  (unknown)  Patient's age 33  (units  

  (unknown)



                    date)                         years or older as unknown)  



                                                  of estimated date           



                                                  of delivery: No           

 

           (unknown)  (no       (unknown)  (unknown)  Patient:  (units    (unkno

wn)



                    date)                         Libra Prieto unknown)  



                                                  MR#: [id number]            

 

           (unknown)  (no       (unknown)  (unknown)  Pediatrician:  (units    (

unknown)



                    date)                         DOD [place] vs unknown)  



                                                  [facility]           



                                                  [place]             

 

           (unknown)  (no       (unknown)  (unknown)  Performing  (units    (unk

nown)



                    date)                         Provider: unknown)  



                                                  Julieta Au MD                  

 

           (unknown)  (no       (unknown)  (unknown)  Personal history  (units  

  (unknown)



                    date)                         of STD: denies hx unknown)  

 

           (unknown)  (no       (unknown)  (unknown)  Personal history  (units  

  (unknown)



                    date)                         of genital unknown)  



                                                  herpes: No           

 

           (unknown)  (no       (unknown)  (unknown)  Plantar   (units    (unkno

wn)



                    date)                         fasciitis unknown)  

 

           (unknown)  (no       (unknown)  (unknown)  Position Sitting  (units  

  (unknown)



                    date)                                   unknown)  

 

           (unknown)  (no       (unknown)  (unknown)  Postpartum  (units    (unk

nown)



                    date)                         family    unknown)  



                                                  planning/Tubal           



                                                  sterilization:           



                                                  further             



                                                  discussion needed           

 

           (unknown)  (no       (unknown)  (unknown)  Pregnancy  (units    (unkn

own)



                    date)                         History   unknown)  

 

           (unknown)  (no       (unknown)  (unknown)  Pregnancy type::  (units  

  (unknown)



                    date)                         Other Normal unknown)  



                                                  Pregnancy           

 

           (unknown)  (no       (unknown)  (unknown)  Prenatal  (units    (unkno

wn)



                    date)                         Education unknown)  

 

           (unknown)  (no       (unknown)  (unknown)  Prenatal Initial  (units  

  (unknown)



                    date)                         Assessment unknown)  

 

           (unknown)  (no       (unknown)  (unknown)  Prenatal  (units    (unkno

wn)



                    date)                         Specific  unknown)  



                                                  Issues/Plans           

 

           (unknown)  (no       (unknown)  (unknown)  Prenatal  (units    (unkno

wn)



                    date)                         Testing:  unknown)  



                                                  discussed           

 

           (unknown)  (no       (unknown)  (unknown)  Prenatal Visit  (units    

(unknown)



                    date)                                   unknown)  

 

           (unknown)  (no       (unknown)  (unknown)  Prenatal  (units    (unkno

wn)



                    date)                         education packet: unknown)  



                                                  Child birth           



                                                  education/plan,           



                                                  Pregnancy           



                                                  symptoms,           

 

           (unknown)  (no       (unknown)  (unknown)  Primary Care  (units    (u

nknown)



                    date)                         Provider: BASIM Escobar unknown)  



                                                  [place]              

 

           (unknown)  (no       (unknown)  (unknown)  Primary Ob  (units    (unk

nown)



                    date)                         Provider: unknown)  



                                                  Julieta Au           

 

           (unknown)  (no       (unknown)  (unknown)  Prior     (units    (unkno

wn)



                    date)                         GBS-Infected unknown)  



                                                  child: No           

 

           (unknown)  (no       (unknown)  (unknown)  Providers  (units    (unkn

own)



                    date)                                   unknown)  

 

           (unknown)  (no       (unknown)  (unknown)  Pt presents for  (units   

 (unknown)



                    date)                         a PNV at 16+6 unknown)  



                                                  wks. No FM. No           



                                                  LOF/VB. Rec'd flu           



                                                  shot                

 

           (unknown)  (no       (unknown)  (unknown)  Rash or viral  (units    (

unknown)



                    date)                         illness since unknown)  



                                                  last menstrual           



                                                  period: No           

 

           (unknown)  (no       (unknown)  (unknown)  Rash      (units    (unkno

wn)



                    date)                                   unknown)  

 

           (unknown)  (no       (unknown)  (unknown)  Reason For Visit  (units  

  (unknown)



                    date)                                   unknown)  

 

           (unknown)  (no       (unknown)  (unknown)  Recent travel  (units    (

unknown)



                    date)                         outside of unknown)  



                                                  country?: No           

 

           (unknown)  (no       (unknown)  (unknown)  Recurrent  (units    (unkn

own)



                    date)                         pregnancy loss or unknown)  



                                                  a stillbirth: Yes           

 

           (unknown)  (no       (unknown)  (unknown)  Reports over the  (units  

  (unknown)



                    date)                         counter   unknown)  



                                                  medications           



                                                  (diclofenac),           



                                                  Denies alcohol,           



                                                  Denies              

 

           (unknown)  (no       (unknown)  (unknown)  Safety    (units    (unkno

wn)



                    date)                                   unknown)  

 

           (unknown)  (no       (unknown)  (unknown)  Second Trimester  (units  

  (unknown)



                    date)                         Education unknown)  



                                                  Checklist           

 

           (unknown)  (no       (unknown)  (unknown)  Selecting a  (units    (un

known)



                    date)                          care unknown)  



                                                  provider: further           



                                                  discussion needed           

 

           (unknown)  (no       (unknown)  (unknown)  She is at 22  (units    (u

nknown)



                    date)                         weeks 6 days. She unknown)  



                                                  has felt good           



                                                  fetal movement.           



                                                  She says it is           

 

           (unknown)  (no       (unknown)  (unknown)  Shingles  (units    (unkno

wn)



                    date)                                   unknown)  

 

           (unknown)  (no       (unknown)  (unknown)  Signed By:  (units    (unk

nown)



                    date)                                   unknown)  

 

           (unknown)  (no       (unknown)  (unknown)  Signs and  (units    (unkn

own)



                    date)                         symptoms of unknown)  



                                                   labor:           



                                                  discussed           

 

           (unknown)  (no       (unknown)  (unknown)  Smoking Status:  (units   

 (unknown)



                    date)                         Never smoker unknown)  

 

           (unknown)  (no       (unknown)  (unknown)  Social History  (units    

(unknown)



                    date)                                   unknown)  

 

           (unknown)  (no       (unknown)  (unknown)  Support   (units    (unkno

wn)



                    date)                         Person(s):: [place] unknown)  

 

           (unknown)  (no       (unknown)  (unknown)  Surgical History  (units  

  (unknown)



                    date)                         (Updated 09/10/22 unknown)  



                                                  @ 21:46 by Fatoumata Flores)             

 

           (unknown)  (no       (unknown)  (unknown)  Surrogate  (units    (unkn

own)



                    date)                         pregnancy?: no unknown)  

 

           (unknown)  (no       (unknown)  (unknown)  Symptoms since  (units    

(unknown)



                    date)                         LMP: Reports unknown)  



                                                  amenorrhea,           



                                                  nausea, fatigue,           



                                                  breast              



                                                  tenderness,           

 

           (unknown)  (no       (unknown)  (unknown)  Tdap Adult  (units    (unk

nown)



                    date)                         (Adacel) Today unknown)  



                                                  Z23 - Encounter           



                                                  for immunization           

 

           (unknown)  (no       (unknown)  (unknown)  Teratogen  (units    (unkn

own)



                    date)                         Exposures since unknown)  



                                                  LMP/Conception:           



                                                  Denies              



                                                  prescription           



                                                  medications,           

 

           (unknown)  (no       (unknown)  (unknown)  Testing   (units    (unkno

wn)



                    date)                         Education unknown)  

 

           (unknown)  (no       (unknown)  (unknown)  Testing   (units    (unkno

wn)



                    date)                         education unknown)  



                                                  completed: group           



                                                  B strep, Spina           



                                                  bifida testing           



                                                  and Cell Free           

 

           (unknown)  (no       (unknown)  (unknown)  This note may  (units    (

unknown)



                    date)                         have been all or unknown)  



                                                  partially           



                                                  generated using           



                                                  voice recognition           

 

           (unknown)  (no       (unknown)  (unknown)  Tobacco +  (units    (unkn

own)



                    date)                         Substance Use unknown)  

 

           (unknown)  (no       (unknown)  (unknown)  Tobacco Status  (units    

(unknown)



                    date)                                   unknown)  

 

           (unknown)  (no       (unknown)  (unknown)  Trimester:: 3rd  (units   

 (unknown)



                    date)                         Trimester unknown)  



                                                  (28wks-Del)           

 

           (unknown)  (no       (unknown)  (unknown)  Tumor (-10/2012)  (units  

  (unknown)



                    date)                                   unknown)  

 

           (unknown)  (no       (unknown)  (unknown)  Type(s) of  (units    (unk

nown)



                    date)                         exercise: unknown)  



                                                  bicycling           



                                                  (stationary           



                                                  bike), regular           



                                                  exercise, weight           

 

           (unknown)  (no       (unknown)  (unknown) UProtein Movement  (units  

  (unknown)



                    date)                         PreLabor FHR Fndl unknown)  



                                                  Ht Pres Edema           



                                                  Cerv Exam           



                                                  US/Comment Next           



                                                  Appt                

 

           (unknown)  (no       (unknown)  (unknown)  Ultrasound  (units    (unk

nown)



                    date)                         performed?: Yes unknown)  

 

           (unknown)  (no       (unknown)  (unknown)  VIS Given Date  (units    

(unknown)



                    date)                         VIS Provided VIS unknown)  



                                                  Publication Date           

 

           (unknown)  (no       (unknown)  (unknown)  Varicella/chicke  (units  

  (unknown)



                    date)                         n pox status: unknown)  



                                                  previous disease           

 

           (unknown)  (no       (unknown)  (unknown)  Visit Date:  (units    (un

known)



                    date)                         23 Last unknown)  



                                                  Updated by:           



                                                  Julieta uA MD                  

 

           (unknown)  (no       (unknown)  (unknown)  Visit Date:  (units    (un

known)



                    date)                         22 Last unknown)  



                                                  Updated by:           



                                                  Julieta Au MD                  

 

           (unknown)  (no       (unknown)  (unknown)  Visit Date:  (units    (un

known)



                    date)                         10/11/22 Last unknown)  



                                                  Updated by:           



                                                  Fanny Baker MD                  

 

           (unknown)  (no       (unknown)  (unknown)  Visit Date:  (units    (un

known)



                    date)                         22 Last unknown)  



                                                  Updated by:           



                                                  Julieta Au MD                  

 

           (unknown)  (no       (unknown)  (unknown)  Visit Date:  (units    (un

known)



                    date)                         22 Last unknown)  



                                                  Updated by: Berta Salinas P.A-C           

 

           (unknown)  (no       (unknown)  (unknown)  Visit Reasons:  (units    

(unknown)



                    date)                         OB w 3D US unknown)  

 

           (unknown)  (no       (unknown)  (unknown)  Vitals    (units    (unkno

wn)



                    date)                                   unknown)  

 

           (unknown)  (no       (unknown)  (unknown)  Vitamins and  (units    (u

nknown)



                    date)                         iron, Diet and unknown)  



                                                  weight gain, Fish           



                                                  and mercury           



                                                  intake, Caffeine           



                                                  use,                

 

           (unknown)  (no       (unknown)  (unknown)  WG        (units    (unkno

wn)



                    date)                                   unknown)  

 

           (unknown)  (no       (unknown)  (unknown)  Weeks     (units    (unkno

wn)



                    date)                         gestation:: 28 unknown)  

 

           (unknown)  (no       (unknown)  (unknown)  Weight 177 lb  (units    (

unknown)



                    date)                                   unknown)  

 

           (unknown)  (no       (unknown)  (unknown)  [place] teeth  (units    (u

nknown)



                    date)                         extracted unknown)  

 

           (unknown)  (no       (unknown)  (unknown)  Zika virus  (units    (unk

nown)



                    date)                         exposure: No unknown)  

 

           (unknown)  (no       (unknown)  (unknown)  accompanied by  (units    

(unknown)



                    date)                         her . She unknown)  



                                                  went through           



                                                  fertility           



                                                  treatments with           

 

           (unknown)  (no       (unknown)  (unknown)  alcohol intake:  (units   

 (unknown)



                    date)                         never     unknown)  

 

           (unknown)  (no       (unknown)  (unknown)  anterior  (units    (unkno

wn)



                    date)                         placenta. Routine unknown)  



                                                  precautions           



                                                  reviewed with the           



                                                  patient. Due to           



                                                  1st                 

 

           (unknown)  (no       (unknown)  (unknown)  anyone in either  (units  

  (unknown)



                    date)                         family with: unknown)  

 

           (unknown)  (no       (unknown)  (unknown)  baby's father  (units    (

unknown)



                    date)                         had a child with unknown)  



                                                  birth defects not           



                                                  listed above and           



                                                  Denies Other           

 

           (unknown)  (no       (unknown)  (unknown)  back pain.  (units    (unk

nown)



                    date)                         Advised   unknown)  



                                                  stretching, belly           



                                                  band, and PT if           



                                                  not improving.           



                                                  AFP was             

 

           (unknown)  (no       (unknown)  (unknown)  blood sugar  (units    (un

known)



                    date)                         diagnostic (Blood unknown)  



                                                  Glucose Test           



                                                  strips) #100 ea           



                                                  23 [Rx           

 

           (unknown)  (no       (unknown)  (unknown)  blood-glucose  (units    (

unknown)



                    date)                         meter #1 ea unknown)  



                                                  23 [Rx           



                                                  Confirmed           



                                                  23]           

 

           (unknown)  (no       (unknown)  (unknown)  by ultrasound is  (units  

  (unknown)



                    date)                         normal with good unknown)  



                                                  fetal movement,           



                                                  normal appearing           



                                                  fluid,              

 

           (unknown)  (no       (unknown)  (unknown)  caffeine: Yes  (units    (

unknown)



                    date)                         (1-2 cups unknown)  



                                                  tea/day)            

 

           (unknown)  (no       (unknown)  (unknown)  carbon monox  (units    (u

nknown)



                    date)                         detector in home: unknown)  



                                                  Yes                 

 

           (unknown)  (no       (unknown)  (unknown)  cfDNA normal  (units    (u

nknown)



                    date)                         female, AFP unknown)  



                                                  negative            

 

           (unknown)  (no       (unknown)  (unknown)  consistent with  (units   

 (unknown)



                    date)                         9 weeks 0 days. unknown)  



                                                  Yolk sac visible.           



                                                  Fetal heart rate           



                                                  178 beats           

 

           (unknown)  (no       (unknown)  (unknown)  current   (units    (unkno

wn)



                    date)                         occupational unknown)  



                                                  exposures/hazards           



                                                  : No                

 

           (unknown)  (no       (unknown)  (unknown)  daily servings  (units    

(unknown)



                    date)                         fruits/ve-4 unknown)  

 

           (unknown)  (no       (unknown)  (unknown)  described, visit  (units  

  (unknown)



                    date)                         schedule  unknown)  



                                                  reviewed,           



                                                  ultrasounds           



                                                  policy reviewed,           



                                                  coverage 24           

 

           (unknown)  (no       (unknown)  (unknown)  developing a  (units    (u

nknown)



                    date)                         pattern. No unknown)  



                                                  leakage of fluid           



                                                  or vaginal           



                                                  bleeding. No           



                                                  contractions           

 

           (unknown)  (no       (unknown)  (unknown)  discussed,  (units    (unk

nown)



                    date)                         tuberculosis unknown)  



                                                  exposure            



                                                  discussed, CMV           



                                                  discussed,           



                                                  Toxoplasmosis           

 

           (unknown)  (no       (unknown)  (unknown)  disorders,  (units    (unk

nown)



                    date)                         Denies Cystic unknown)  



                                                  Fibrosis, Denies           



                                                  Mental              



                                                  Retardation/Autis           



                                                  m, Denies           

 

           (unknown)  (no       (unknown)  (unknown)  do you feel safe  (units  

  (unknown)



                    date)                         at home: Yes unknown)  

 

           (unknown)  (no       (unknown)  (unknown)  during the past  (units   

 (unknown)



                    date)                         year weight has: unknown)  



                                                  remained stable           

 

           (unknown)  (no       (unknown)  (unknown)  education level:  (units  

  (unknown)



                    date)                         college   unknown)  



                                                  (Associate's           



                                                  degree)             

 

           (unknown)  (no       (unknown)  (unknown)  exposure,  (units    (unkn

own)



                    date)                         Medication use, unknown)  



                                                  Sauna/hot tub           



                                                  use, Dental care,           



                                                  Travel and           



                                                  Influenza           

 

           (unknown)  (no       (unknown)  (unknown)  fire      (units    (unkno

wn)



                    date)                         extinguisher in unknown)  



                                                  home: Yes           

 

           (unknown)  (no       (unknown)  (unknown)  firearms in  (units    (un

known)



                    date)                         home: Yes unknown)  



                                                  firearms unloaded           



                                                  and locked: Yes           

 

           (unknown)  (no       (unknown)  (unknown)  frequency: 5-6  (units    

(unknown)



                    date)                         times per week unknown)  

 

           (unknown)  (no       (unknown)  (unknown)  gestational sac  (units   

 (unknown)



                    date)                         with a fetus with unknown)  



                                                  a crown-rump           



                                                  length measuring           



                                                  2.29 cm             

 

           (unknown)  (no       (unknown)  (unknown)  have occurred.  (units    

(unknown)



                    date)                         If there are any unknown)  



                                                  questions, please           



                                                  contact the           



                                                  Medical Records           

 

           (unknown)  (no       (unknown)  (unknown)  hours a day and  (units   

 (unknown)



                    date)                         participation of unknown)  



                                                  father in           



                                                  prenatal care and           



                                                  office visits           

 

           (unknown)  (no       (unknown)  (unknown)  household  (units    (unkn

own)



                    date)                         members: spouse unknown)  



                                                  and family           



                                                  (father and           



                                                  father-in-law)           

 

           (unknown)  (no       (unknown)  (unknown)  housing: house  (units    

(unknown)



                    date)                                   unknown)  

 

           (unknown)  (no       (unknown)  (unknown)  illicit drugs  (units    (

unknown)



                    date)                         and Denies other unknown)  

 

           (unknown)  (no       (unknown)  (unknown)  kag       (units    (unkno

wn)



                    date)                                   unknown)  

 

           (unknown)  (no       (unknown)  (unknown)  lancets #100 ea  (units   

 (unknown)



                    date)                         23 [Rx unknown)  



                                                  Confirmed           



                                                  23]           

 

           (unknown)  (no       (unknown)  (unknown)  last visit.  (units    (un

known)



                    date)                         Plan: AFP today. unknown)  



                                                  20 wk u/s           



                                                  reviewed. F/U 4           



                                                  wks. Warning           



                                                  signs               

 

           (unknown)  (no       (unknown)  (unknown)  lifting and  (units    (un

known)



                    date)                         other (hiking) unknown)  

 

           (unknown)  (no       (unknown)  (unknown)  lives     (units    (unkno

wn)



                    date)                         independently: unknown)  



                                                  Yes                 

 

           (unknown)  (no       (unknown)  (unknown)  marital status:  (units   

 (unknown)



                    date)                            unknown)  

 

           (unknown)  (no       (unknown)  (unknown)  may occur.  (units    (unk

nown)



                    date)                         Occasional unknown)  



                                                  wrong-word or           



                                                  'sound-alike'           



                                                  substitutions may           



                                                  have                

 

           (unknown)  (no       (unknown)  (unknown)  medication only.  (units  

  (unknown)



                    date)                         Had 7     unknown)  



                                                  miscarriages. No           



                                                  bleeding with           



                                                  this pregnancy.           



                                                  This 1              

 

           (unknown)  (no       (unknown)  (unknown)  movement and  (units    (u

nknown)



                    date)                         denies VB, LOF, unknown)  



                                                  cramping and           



                                                  regular             



                                                  contractions. Pt           



                                                  reports low           

 

           (unknown)  (no       (unknown)  (unknown)  negative.  (units    (unkn

own)



                    date)                         Anatomy US unknown)  



                                                  scheduled for           



                                                  later today.           



                                                  Warning             



                                                  precautions           



                                                  reviewed.           

 

           (unknown)  (no       (unknown)  (unknown)  nickel Allergy  (units    

(unknown)



                    date)                         (Mild, Verified unknown)  



                                                  23 13:15)           

 

           (unknown)  (no       (unknown)  (unknown)  number of  (units    (unkn

own)



                    date)                         children: 0 unknown)  

 

           (unknown)  (no       (unknown)  (unknown)  occupational  (units    (u

nknown)



                    date)                         status: employed unknown)  



                                                  (active duty           



                                                  avionics            



                                                  ,           



                                                  [employer] job            

 

           (unknown)  (no       (unknown)  (unknown)  occurred due to  (units   

 (unknown)



                    date)                         the inherent unknown)  



                                                  limitations of           



                                                  voice recognition           



                                                  software. Please           

 

           (unknown)  (no       (unknown)  (unknown)  or cramping.  (units    (u

nknown)



                    date)                         Plan: 1 hour unknown)  



                                                  glucose ordered.           



                                                  Follow-up in 4           



                                                  weeks. Warning           

 

           (unknown)  (no       (unknown)  (unknown)  per minute.  (units    (un

known)



                    date)                         Normal ovaries unknown)  



                                                  bilaterally.           



                                                  Plan: Follow-up           



                                                  in 4 weeks.           



                                                  Warning             

 

           (unknown)  (no       (unknown)  (unknown)  pets and  (units    (unkno

wn)



                    date)                         animals: Yes (1 unknown)  



                                                  large dog,           



                                                  chickens)           

 

           (unknown)  (no       (unknown)  (unknown)  precautions,  (units    (u

nknown)



                    date)                         Listeriosis unknown)  



                                                  prevention and           



                                                  Rubella             



                                                  Immunization           

 

           (unknown)  (no       (unknown)  (unknown)  preeclampsia.  (units    (

unknown)



                    date)                                   unknown)  

 

           (unknown)  (no       (unknown)  (unknown)  pregnancy  (units    (unkn

own)



                    date)                         discussed unknown)  



                                                  starting baby           



                                                  aspirin per day           



                                                  to decrease her           



                                                  risk for            

 

           (unknown)  (no       (unknown)  (unknown)  prenat.vits,nan,  (units  

  (unknown)



                    date)                         min-iron-folic 1 unknown)  



                                                  tab PO DAILY           



                                                  22 [History           



                                                  Confirmed           

 

           (unknown)  (no       (unknown)  (unknown)  read the note  (units    (

unknown)



                    date)                         carefully and unknown)  



                                                  recognize, using           



                                                  context, where           



                                                  these               



                                                  substitutions           

 

           (unknown)  (no       (unknown)  (unknown)  reviewed.  (units    (unkn

own)



                    date)                                   unknown)  

 

           (unknown)  (no       (unknown)  (unknown)  seatbelt use:  (units    (

unknown)



                    date)                         always    unknown)  

 

           (unknown)  (no       (unknown)  (unknown)  second hand  (units    (un

known)



                    date)                         exposure: Yes unknown)  



                                                  (father-in-law           



                                                  smokes outside           



                                                  the house)           

 

           (unknown)  (no       (unknown)  (unknown)  signs reviewed.  (units   

 (unknown)



                    date)                         cfDNA ordered. unknown)  

 

           (unknown)  (no       (unknown)  (unknown)  signs reviewed.  (units   

 (unknown)



                    date)                                   unknown)  

 

           (unknown)  (no       (unknown)  (unknown)  software.  (units    (unkn

own)



                    date)                         Although every unknown)  



                                                  effort is made to           



                                                  edit content,           



                                                  transcription           



                                                  errors              

 

           (unknown)  (no       (unknown)  (unknown)  special madi  (units    (

unknown)



                    date)                         needs: No unknown)  

 

           (unknown)  (no       (unknown)  (unknown)  substance use  (units    (

unknown)



                    date)                         type: does not unknown)  



                                                  use                 

 

           (unknown)  (no       (unknown)  (unknown)  travel history:  (units   

 (unknown)



                    date)                         over 6 months ago unknown)  

 

           (unknown)  (no       (unknown)  (unknown)  urinary   (units    (unkno

wn)



                    date)                         frequency and unknown)  



                                                  irritability           

 

           (unknown)  (no       (unknown)  (unknown)  vaccine (Annual  (units   

 (unknown)



                    date)                         flu, Phizer Covid unknown)  



                                                  x2)                 

 

           (unknown)  (no       (unknown)  (unknown)  w0d 4 wks  (units    (unkn

own)



                    date)                                   unknown)  

 

           (unknown)  (no       (unknown)  (unknown)  was not using  (units    (

unknown)



                    date)                         any infertility unknown)  



                                                  medications.           



                                                  Ultrasound: An           



                                                  intrauterine           

 

           (unknown)  (no       (unknown)  (unknown)  water heater  (units    (u

nknown)



                    date)                         temp set < 120 unknown)  



                                                  deg: Yes            

 

           (unknown)  (no       (unknown)  (unknown)  well-balanced  (units    (

unknown)



                    date)                         diet: daily or unknown)  



                                                  most days           

 

           (unknown)  (no       (unknown)  (unknown)  went away with  (units    

(unknown)



                    date)                         laying down. No unknown)  



                                                  vaginal bleeding.           



                                                  No fevers. Fetal           



                                                  heart tone           

 

           (unknown)  (no       (unknown)  (unknown)  while pregnant)  (units   

 (unknown)



                    date)                                   unknown)  

 

           (unknown)  (no       (unknown)  (unknown)  working smoke  (units    (

unknown)



                    date)                         detector in home: unknown)  



                                                  Yes                 









                                         Result panel 15









           (unknown)  (no       (unknown)  (unknown)  (no value)  (units    (unk

nown)



                    date)                                   unknown)  

 

           (unknown)  (no       (unknown)  (unknown)  (+12 lb) 120/58  (units   

 (unknown)



                    date)                         N         unknown)  

 

           (unknown)  (no       (unknown)  (unknown)  (+13 lb) 104/54  (units   

 (unknown)



                    date)                         N         unknown)  

 

           (unknown)  (no       (unknown)  (unknown)  (+18 lb) 122/60  (units   

 (unknown)



                    date)                         N         unknown)  

 

           (unknown)  (no       (unknown)  (unknown)  (+22 lb) 110/62  (units   

 (unknown)



                    date)                                   unknown)  

 

           (unknown)  (no       (unknown)  (unknown)  (+7 lb) 128/66 N  (units  

  (unknown)



                    date)                                   unknown)  

 

           (unknown)  (no       (unknown)  (unknown)  (+8 lb) 122/68 N  (units  

  (unknown)



                    date)                                   unknown)  

 

           (unknown)  (no       (unknown)  (unknown)  **Genetic  (units    (unkn

own)



                    date)                         Screening/Teratol unknown)  



                                                  ogy Counseling -           



                                                  Includes patient,           



                                                  baby's father, or           

 

           (unknown)  (no       (unknown)  (unknown)  -?-?-?-?-?-?-?-?  (units  

  (unknown)



                    date)                         -?-?-?-?  unknown)  

 

           (unknown)  (no       (unknown)  (unknown)  0.5 mL IM Right  (units   

 (unknown)



                    date)                         Deltoid [id number] unknown)  



                                                  24            



                                                  [id number]           



                                                  [employer]           

 

           (unknown)  (no       (unknown)  (unknown)  23  (units    (unkno

wn)



                    date)                                   unknown)  

 

           (unknown)  (no       (unknown)  (unknown)  23 Single  (units   

 (unknown)



                    date)                         Vaccine 21 unknown)  

 

           (unknown)  (no       (unknown)  (unknown)  23  (units    (unkno

wn)



                    date)                                   unknown)  

 

           (unknown)  (no       (unknown)  (unknown)  23]  (units    (unkn

own)



                    date)                                   unknown)  

 

           (unknown)  (no       (unknown)  (unknown)  04/15/23  (units    (unkno

wn)



                    date)                         Ultrasound #1 29w unknown)  



                                                  3d                  

 

           (unknown)  (no       (unknown)  (unknown)  [id number]   (units    (unkno

wn)



                    date)                                   unknown)  

 

           (unknown)  (no       (unknown)  (unknown)  22  (units    (unkno

wn)



                    date)                                   unknown)  

 

           (unknown)  (no       (unknown)  (unknown)  10/11/22  (units    (unkno

wn)



                    date)                                   unknown)  

 

           (unknown)  (no       (unknown)  (unknown)  22  (units    (unkno

wn)



                    date)                                   unknown)  

 

           (unknown)  (no       (unknown)  (unknown)  22  (units    (unkno

wn)



                    date)                                   unknown)  

 

           (unknown)  (no       (unknown)  (unknown)  12w 6d 163 lb  (units    (

unknown)



                    date)                                   unknown)  

 

           (unknown)  (no       (unknown)  (unknown)  13:15     (units    (unkno

wn)



                    date)                                   unknown)  

 

           (unknown)  (no       (unknown)  (unknown)  16w 6d 167 lb  (units    (

unknown)



                    date)                                   unknown)  

 

           (unknown)  (no       (unknown)  (unknown)  20w 5d 168 lb  (units    (

unknown)



                    date)                                   unknown)  

 

           (unknown)  (no       (unknown)  (unknown)  24w 6d 173 lb  (units    (

unknown)



                    date)                                   unknown)  

 

           (unknown)  (no       (unknown)  (unknown)  28w 6d 177 lb  (units    (

unknown)



                    date)                                   unknown)  

 

           (unknown)  (no       (unknown)  (unknown)  3 wks     (units    (unkno

wn)



                    date)                                   unknown)  

 

           (unknown)  (no       (unknown)  (unknown)  4 wk      (units    (unkno

wn)



                    date)                                   unknown)  

 

           (unknown)  (no       (unknown)  (unknown)  8w 6d 162 lb  (units    (u

nknown)



                    date)                                   unknown)  

 

           (unknown)  (no       (unknown)  (unknown)  Abnormal lab  (units    (u

nknown)



                    date)                         values 1st unknown)  



                                                  trimester:           



                                                  discussed           

 

           (unknown)  (no       (unknown)  (unknown)  Abnormal lab  (units    (u

nknown)



                    date)                         values 2nd unknown)  



                                                  trimester:           



                                                  discussed           

 

           (unknown)  (no       (unknown)  (unknown)  Adacel(Tdap  (units    (un

known)



                    date)                         Adolesn/Adult)(PF unknown)  



                                                  )                   

 

           (unknown)  (no       (unknown)  (unknown)  Add'l Plan  (units    (unk

nown)



                    date)                         Details   unknown)  

 

           (unknown)  (no       (unknown)  (unknown)  Administered by:  (units  

  (unknown)



                    date)                         Edelmira Montero, unknown)  



                                                  MA on 23           



                                                  13:25               

 

           (unknown)  (no       (unknown)  (unknown)  Age/Sex: 27 / F  (units   

 (unknown)



                    date)                         Date of Service: unknown)  

 

           (unknown)  (no       (unknown)  (unknown)  Allergies  (units    (unkn

own)



                    date)                         ()   unknown)  

 

           (unknown)  (no       (unknown)  (unknown)  Allergies  (units    (unkn

own)



                    date)                                   unknown)  

 

           (unknown)  (no       (unknown)  (unknown)  SARINA Barker  (units    (

unknown)



                    date)                         [id number]     unknown)  

 

           (unknown)  (no       (unknown)  (unknown)  Anesthesia  (units    (unk

nown)



                    date)                                   unknown)  

 

           (unknown)  (no       (unknown)  (unknown)  Aneuploidy  (units    (unk

nown)



                    date)                         Screening unknown)  



                                                  Offered: Accepted           



                                                  (wants CFDNA)           

 

           (unknown)  (no       (unknown)  (unknown)  Anticipated  (units    (un

known)



                    date)                         course of unknown)  



                                                  prenatal care:           



                                                  discussed           

 

           (unknown)  (no       (unknown)  (unknown)  Anxiety (-2016)  (units   

 (unknown)



                    date)                                   unknown)  

 

           (unknown)  (no       (unknown)  (unknown)  Arrhythmia  (units    (unk

nown)



                    date)                                   unknown)  

 

           (unknown)  (no       (unknown)  (unknown)  Assessment and  (units    

(unknown)



                    date)                         Plan      unknown)  

 

           (unknown)  (no       (unknown)  (unknown)  Attending Dr:  (units    (

unknown)



                    date)                         Julieta Au unknown)  



                                                  MD                  

 

           (unknown)  (no       (unknown)  (unknown)  Libra presents  (units   

 (unknown)



                    date)                         today for routine unknown)  



                                                  OB f/u at 20w5d.           



                                                  She reports good           



                                                  fetal               

 

           (unknown)  (no       (unknown)  (unknown)  BMI 28.5  (units    (unkno

wn)



                    date)                                   unknown)  

 

           (unknown)  (no       (unknown)  (unknown)  /62  (units    (unkn

own)



                    date)                                   unknown)  

 

           (unknown)  (no       (unknown)  (unknown)  Birth     (units    (unkno

wn)



                    date)                         Plan/Preferences unknown)  

 

           (unknown)  (no       (unknown)  (unknown)  Birth Planning  (units    

(unknown)



                    date)                                   unknown)  

 

           (unknown)  (no       (unknown)  (unknown)  Blood Pressure  (units    

(unknown)



                    date)                         Location Lt unknown)  



                                                  brachial            

 

           (unknown)  (no       (unknown)  (unknown)  Blood     (units    (unkno

wn)



                    date)                         transfusions?: unknown)  



                                                  yes (Never had           



                                                  but would accept)           

 

           (unknown)  (no       (unknown)  (unknown)  Breastfeeding:  (units    

(unknown)



                    date)                         discussed unknown)  

 

           (unknown)  (no       (unknown)  (unknown)  Chicken pox  (units    (un

known)



                    date)                         ()   unknown)  

 

           (unknown)  (no       (unknown)  (unknown)  Childbirth  (units    (unk

nown)



                    date)                         Classes:  unknown)  



                                                  discussed           

 

           (unknown)  (no       (unknown)  (unknown)  Conceived  (units    (unkn

own)



                    date)                         naturally this unknown)  



                                                  pregnancy           

 

           (unknown)  (no       (unknown)  (unknown)  Confirmed  (units    (unkn

own)



                    date)                         23] unknown)  

 

           (unknown)  (no       (unknown)  (unknown)  Current Estimate  (units  

  (unknown)



                    date)                         23  unknown)  



                                                  Ultrasound #2 28w           



                                                  6d                  

 

           (unknown)  (no       (unknown)  (unknown)  Current   (units    (unkno

wn)



                    date)                         Pregnancy History unknown)  

 

           (unknown)  (no       (unknown)  (unknown)  DNA       (units    (unkno

wn)



                    date)                                   unknown)  

 

           (unknown)  (no       (unknown)  (unknown)  : 1995  (units   

 (unknown)



                    date)                         Acct:[id number] unknown)  

 

           (unknown)  (no       (unknown)  (unknown)  Date of positive  (units  

  (unknown)



                    date)                         home pregnancy unknown)  



                                                  test: 08/15/22           

 

           (unknown)  (no       (unknown)  (unknown)  Date      (units    (unkno

wn)



                    date)                                   unknown)  

 

           (unknown)  (no       (unknown)  (unknown) Denies Congenital  (units  

  (unknown)



                    date)                         Heart Defect, unknown)  



                                                  Denies Down           



                                                  Syndrome, Denies           



                                                  Muscular            



                                                  Dystrophy,           

 

           (unknown)  (no       (unknown)  (unknown)  Denies Neural  (units    (

unknown)



                    date)                         Tube Defect unknown)  



                                                  (Meningomyelocele           



                                                  , Spina Bifida,           



                                                  or Anencephaly),           

 

           (unknown)  (no       (unknown)  (unknown)  Denies Sickle  (units    (

unknown)



                    date)                         Cell Disease or unknown)  



                                                  Trait (),           



                                                  Denies Hemophilia           



                                                  or other blood           

 

           (unknown)  (no       (unknown)  (unknown)  Denies [place]  (units  

  (unknown)



                    date)                         (Ashkenazi unknown)  



                                                  [employer], [place],           



                                                  [place]),           



                                                  Denies [place]           

 

           (unknown)  (no       (unknown)  (unknown)  Depression  (units    (unk

nown)



                    date)                         (-2016)   unknown)  

 

           (unknown)  (no       (unknown)  (unknown)  Depression:  (units    (un

known)



                    date)                         discussed unknown)  

 

           (unknown)  (no       (unknown)  (unknown)  Dept at   (units    (unkno

wn)



                    date)                         [phone] unknown)  

 

           (unknown)  (no       (unknown)  (unknown)  Diet and  (units    (unkno

wn)



                    date)                         Exercise  unknown)  

 

           (unknown)  (no       (unknown)  (unknown)  Disease   (units    (unkno

wn)



                    date)                         (Ashkenazi unknown)  



                                                  Advent), Denies           



                                                  Familial            



                                                  Dysautonomia           



                                                  (Ashkenazi           



                                                  Advent),            

 

           (unknown)  (no       (unknown)  (unknown)  Documented By:  (units    

(unknown)



                    date)                         Julieta Au unknown)  



                                                  MD 23 1314           

 

           (unknown)  (no       (unknown)  (unknown)  Dose Route Admin  (units  

  (unknown)



                    date)                         Location Lot unknown)  



                                                  Number Expiration           



                                                  Date NDC            

 

           (unknown)  (no       (unknown)  (unknown)  [employer]     (units    (unkno

wn)



                    date)                                   unknown)  

 

           (unknown)  (no       (unknown)  (unknown)  MEHNAZ Calculator  (units    

(unknown)



                    date)                                   unknown)  

 

           (unknown)  (no       (unknown)  (unknown)  EGA Weight BP  (units    (

unknown)



                    date)                         UGlucose  unknown)  

 

           (unknown)  (no       (unknown)  (unknown)  Eczema (-2000)  (units    

(unknown)



                    date)                                   unknown)  

 

           (unknown)  (no       (unknown)  (unknown)  Eligibility  (units    (un

known)



                    date)                         Eligibility Date unknown)  



                                                  Funding Source           

 

           (unknown)  (no       (unknown)  (unknown)  Estimated  (units    (unkn

own)



                    date)                         Delivery Date unknown)  



                                                  Method Current           

 

           (unknown)  (no       (unknown)  (unknown)  Exercise and  (units    (u

nknown)



                    date)                         activity, unknown)  



                                                  work/environmenta           



                                                  l/hazards, Sexual           



                                                  activity, X-ray           

 

           (unknown)  (no       (unknown)  (unknown)  F/u in 4 wks.  (units    (

unknown)



                    date)                                   unknown)  

 

           (unknown)  (no       (unknown)  (unknown)  Family History  (units    

(unknown)



                    date)                         (Updated 09/10/22 unknown)  



                                                  @ 21:49 by Fatoumata Flores)             

 

           (unknown)  (no       (unknown)  (unknown)  Family/Other  (units    (u

nknown)



                    date)                         Multiple  unknown)  



                                                  sclerosis           

 

           (unknown)  (no       (unknown)  (unknown)  Father    (units    (unkno

wn)



                    date)                         Hypertension unknown)  

 

           (unknown)  (no       (unknown)  (unknown)  Father of Baby:  (units   

 (unknown)



                    date)                         same      unknown)  

 

           (unknown)  (no       (unknown)  (unknown)  [facility]  (units   

 (unknown)



                    date)                         Associates unknown)  

 

           (unknown)  (no       (unknown)  (unknown)  First Trimester  (units   

 (unknown)



                    date)                         Education unknown)  



                                                  Checklist           

 

           (unknown)  (no       (unknown)  (unknown)  Foot pain  (units    (unkn

own)



                    date)                         ()   unknown)  

 

           (unknown)  (no       (unknown)  (unknown)   (SAB  (units    (unkn

own)



                    date)                         x7),Fertility Tx, unknown)  



                                                  meds only,           



                                                  started on baby           



                                                  aspirin             



                                                  10/11/2022           

 

           (unknown)  (no       (unknown)  (unknown)  Genetic   (units    (unkno

wn)



                    date)                         Screening + unknown)  



                                                  Counseling           

 

           (unknown)  (no       (unknown)  (unknown)  Genetic   (units    (unkno

wn)



                    date)                         Screening unknown)  

 

           (unknown)  (no       (unknown)  (unknown)  Grandfather  (units    (un

known)



                    date)                          Cancer unknown)  

 

           (unknown)  (no       (unknown)  (unknown)  Grandfather  (units    (un

known)



                    date)                          Stroke unknown)  

 

           (unknown)  (no       (unknown)  (unknown)  Grandmother  (units    (un

known)



                    date)                          History unknown)  



                                                  of heart disease           

 

           (unknown)  (no       (unknown)  (unknown)  Grandmother  (units    (un

known)



                    date)                          Multiple unknown)  



                                                  sclerosis           

 

           (unknown)  (no       (unknown)  (unknown)   8  (units    (unkn

own)



                    date)                         Multiple births 0 unknown)  

 

           (unknown)  (no       (unknown)  (unknown)  HIV risk  (units    (unkno

wn)



                    date)                         evaluation: low unknown)  



                                                  risk                

 

           (unknown)  (no       (unknown)  (unknown)  [facility]  (units    (

unknown)



                    date)                         Education unknown)  

 

           (unknown)  (no       (unknown)  (unknown)  Health center  (units    (

unknown)



                    date)                         information: unknown)  



                                                  nature of           



                                                  practice            



                                                  discussed,           



                                                  prenatal            



                                                  personnel           

 

           (unknown)  (no       (unknown)  (unknown)  Height 5 ft 6 in  (units  

  (unknown)



                    date)                                   unknown)  

 

           (unknown)  (no       (unknown)  (unknown)  Hepatitis C risk  (units  

  (unknown)



                    date)                         evaluation: low unknown)  



                                                  risk                

 

           (unknown)  (no       (unknown)  (unknown)  History of  (units    (unk

nown)



                    date)                         Hepatitis B: No unknown)  

 

           (unknown)  (no       (unknown)  (unknown)  History of  (units    (unk

nown)



                    date)                         Hepatitis C: No unknown)  

 

           (unknown)  (no       (unknown)  (unknown)  History of  (units    (unk

nown)



                    date)                         recurrent unknown)  



                                                  miscarriages           

 

           (unknown)  (no       (unknown)  (unknown)  Hospital:   (units    (u

nknown)



                    date)                                   unknown)  

 

           (unknown)  (no       (unknown)  (unknown)  [facility]  (units    (u

nknown)



                    date)                         Chorea, Denies unknown)  



                                                  Other inherited           



                                                  genetic or           



                                                  chromosomal           



                                                  disorder, D           

 

           (unknown)  (no       (unknown)  (unknown)  , [place]  (units    (

unknown)



                    date)                         [place]  unknown)  

 

           (unknown)  (no       (unknown)  (unknown)  Hx #   (units    (u

nknown)



                    date)                         Pregnancies 0 unknown)  



                                                  Elective            



                                                  abortions 0           

 

           (unknown)  (no       (unknown)  (unknown)  Hx # Term  (units    (unkn

own)



                    date)                         Pregnancies 0 unknown)  



                                                  Ectopic             



                                                  pregnancies 0           

 

           (unknown)  (no       (unknown)  (unknown)  Immunizations  (units    (

unknown)



                    date)                                   unknown)  

 

           (unknown)  (no       (unknown)  (unknown)  Infant will be  (units    

(unknown)



                    date)                         adopted?: no unknown)  

 

           (unknown)  (no       (unknown)  (unknown)  Infection  (units    (unkn

own)



                    date)                         History   unknown)  

 

           (unknown)  (no       (unknown)  (unknown)  Infectious  (units    (unk

nown)



                    date)                         Disease Education unknown)  

 

           (unknown)  (no       (unknown)  (unknown)  Infectious  (units    (unk

nown)



                    date)                         disease exposure: unknown)  



                                                  chicken pox           



                                                  immunity            



                                                  discussed,           



                                                  hepatitis risk           

 

           (unknown)  (no       (unknown)  (unknown)  Infertility  (units    (un

known)



                    date)                         ()   unknown)  

 

           (unknown)  (no       (unknown)  (unknown)  Initial Weight:  (units   

 (unknown)



                    date)                         155 lb    unknown)  

 

           (unknown)  (no       (unknown)  (unknown)  Initials  (units    (unkno

wn)



                    date)                                   unknown)  

 

           (unknown)  (no       (unknown)  (unknown)  Intake Clinical  (units   

 (unknown)



                    date)                         Staff     unknown)  

 

           (unknown)  (no       (unknown)  (unknown)  Intake Note:  (units    (u

nknown)



                    date)                                   unknown)  

 

           (unknown)  (no       (unknown)  (unknown)  Intake performed  (units  

  (unknown)



                    date)                         by:       unknown)  



                                                  Edelmira Montero           

 

           (unknown)  (no       (unknown)  (unknown)  Intake    (units    (unkno

wn)



                    date)                                   unknown)  

 

           (unknown)  (no       (unknown)  (unknown)  Irregular  (units    (unkn

own)



                    date)                         menstrual cycle unknown)  



                                                  ()             

 

           (unknown)  (no       (unknown)  (unknown)  LM        (units    (unkno

wn)



                    date)                                   unknown)  

 

           (unknown)  (no       (unknown)  (unknown)  Lipoma    (units    (unkno

wn)



                    date)                                   unknown)  

 

           (unknown)  (no       (unknown)  (unknown)  Live with  (units    (unkn

own)



                    date)                         someone with TB unknown)  



                                                  or exposed to TB:           



                                                  No                  

 

           (unknown)  (no       (unknown)  (unknown)  Loc: [facility]  (units    (unkno

wn)



                    date)                                   unknown)  

 

           (unknown)  (no       (unknown)  (unknown)    (units    (u

nknown)



                    date)                                   unknown)  

 

           (unknown)  (no       (unknown)  (unknown)  Marital status:  (units   

 (unknown)



                    date)                            unknown)  

 

           (unknown)  (no       (unknown)  (unknown)  Medical History  (units   

 (unknown)



                    date)                         (Updated 22 unknown)  



                                                  @ 15:16 by           



                                                  Julieta Au MD)                 

 

           (unknown)  (no       (unknown)  (unknown)  Medications  (units    (un

known)



                    date)                                   unknown)  

 

           (unknown)  (no       (unknown)  (unknown)  Mother Gastric  (units    

(unknown)



                    date)                         cancer    unknown)  

 

           (unknown)  (no       (unknown)  (unknown)  N No 142 13 N/A  (units   

 (unknown)



                    date)                         4wk       unknown)  

 

           (unknown)  (no       (unknown)  (unknown)  N No no 143 17  (units    

(unknown)



                    date)                         N/A absent AFP 4 unknown)  



                                                  wks                 

 

           (unknown)  (no       (unknown)  (unknown)  N No no 178 9  (units    (

unknown)



                    date)                         N/A absent unknown)  



                                                  long/closed AGA 9           

 

           (unknown)  (no       (unknown)  (unknown)  N Yes no 141 24  (units   

 (unknown)



                    date)                         N/A absent 4 wks unknown)  

 

           (unknown)  (no       (unknown)  (unknown)  N Yes no 142 20  (units   

 (unknown)



                    date)                         N/A absent AFP unknown)  



                                                  negative            

 

           (unknown)  (no       (unknown)  (unknown)  NF        (units    (unkno

wn)



                    date)                                   unknown)  

 

           (unknown)  (no       (unknown)  (unknown)  Not [facility] Eligible  (units  

  (unknown)



                    date)                         23 Private unknown)  



                                                  Funds               

 

           (unknown)  (no       (unknown)  (unknown)  Notes     (units    (unkno

wn)



                    date)                                   unknown)  

 

           (unknown)  (no       (unknown)  (unknown)  Number of Living  (units  

  (unknown)



                    date)                         Children 0 unknown)  

 

           (unknown)  (no       (unknown)  (unknown)  Number of  (units    (unkn

own)



                    date)                         fetuses:: Single unknown)  

 

           (unknown)  (no       (unknown)  (unknown)  Nutrition and  (units    (

unknown)



                    date)                         weight gain unknown)  



                                                  counseling:           



                                                  special diet:           



                                                  discussed           

 

           (unknown)  (no       (unknown)  (unknown)  OB Office Visit  (units   

 (unknown)



                    date)                                   unknown)  

 

           (unknown)  (no       (unknown)  (unknown)  OB Visit Log  (units    (u

nknown)



                    date)                                   unknown)  

 

           (unknown)  (no       (unknown)  (unknown)  OB check  (units    (unkno

wn)



                    date)                                   unknown)  

 

           (unknown)  (no       (unknown)  (unknown)  On birth control  (units  

  (unknown)



                    date)                         at conception?: unknown)  



                                                  No                  

 

           (unknown)  (no       (unknown)  (unknown)  Orders    (units    (unkno

wn)



                    date)                                   unknown)  

 

           (unknown)  (no       (unknown)  (unknown)  Orders:   (units    (unkno

wn)



                    date)                                   unknown)  

 

           (unknown)  (no       (unknown)  (unknown)  Other Estimates  (units   

 (unknown)



                    date)                         23 LMP unknown)  



                                                  (Certain) 31w 0d           

 

           (unknown)  (no       (unknown)  (unknown)  Ovarian cyst  (units    (u

nknown)



                    date)                         ()   unknown)  

 

           (unknown)  (no       (unknown)  (unknown)  PCOS (polycystic  (units  

  (unknown)



                    date)                         ovarian syndrome) unknown)  

 

           (unknown)  (no       (unknown)  (unknown)  PFSH      (units    (unkno

wn)



                    date)                                   unknown)  

 

           (unknown)  (no       (unknown)  (unknown)  Pap performed?:  (units   

 (unknown)



                    date)                         No        unknown)  

 

           (unknown)  (no       (unknown)  (unknown)  Para 0    (units    (unkno

wn)



                    date)                         Spontaneous unknown)  



                                                  abortions 7           

 

           (unknown)  (no       (unknown)  (unknown)  Partner history  (units   

 (unknown)



                    date)                         of STD: denies hx unknown)  

 

           (unknown)  (no       (unknown)  (unknown)  Partner history  (units   

 (unknown)



                    date)                         of genital unknown)  



                                                  herpes: No           

 

           (unknown)  (no       (unknown)  (unknown)  Partner: [place]  (units    (

unknown)



                    date)                         [place]  unknown)  

 

           (unknown)  (no       (unknown)  (unknown)  Patient comes in  (units  

  (unknown)



                    date)                         for follow-up OB unknown)  



                                                  visit. She had           



                                                  some pelvic pain           



                                                  that                

 

           (unknown)  (no       (unknown)  (unknown)  Patient presents  (units  

  (unknown)



                    date)                         for a new OB unknown)  



                                                  visit at 8 weeks           



                                                  gestation. She is           

 

           (unknown)  (no       (unknown)  (unknown)  Patient presents  (units  

  (unknown)



                    date)                         for a routine unknown)  



                                                  prenatal visit,           



                                                  accompanied by           



                                                  her .           

 

           (unknown)  (no       (unknown)  (unknown)  Patient's age 33  (units  

  (unknown)



                    date)                         years or older as unknown)  



                                                  of estimated date           



                                                  of delivery: No           

 

           (unknown)  (no       (unknown)  (unknown)  Patient:  (units    (unkno

wn)



                    date)                         Libra Prieto unknown)  



                                                  MR#: [id number]            

 

           (unknown)  (no       (unknown)  (unknown)  Pediatrician:  (units    (

unknown)



                    date)                         DOD [place] vs unknown)  



                                                  [facility]           



                                                  [facility]           



                                                  [facility]             

 

           (unknown)  (no       (unknown)  (unknown)  Performing  (units    (unk

nown)



                    date)                         Provider: unknown)  



                                                  Julieta Au MD                  

 

           (unknown)  (no       (unknown)  (unknown)  Personal history  (units  

  (unknown)



                    date)                         of STD: denies hx unknown)  

 

           (unknown)  (no       (unknown)  (unknown)  Personal history  (units  

  (unknown)



                    date)                         of genital unknown)  



                                                  herpes: No           

 

           (unknown)  (no       (unknown)  (unknown)  Plantar   (units    (unkno

wn)



                    date)                         fasciitis unknown)  

 

           (unknown)  (no       (unknown)  (unknown)  Position Sitting  (units  

  (unknown)



                    date)                                   unknown)  

 

           (unknown)  (no       (unknown)  (unknown)  Postpartum  (units    (unk

nown)



                    date)                         family    unknown)  



                                                  planning/Tubal           



                                                  sterilization:           



                                                  further             



                                                  discussion needed           

 

           (unknown)  (no       (unknown)  (unknown)  Pregnancy  (units    (unkn

own)



                    date)                         History   unknown)  

 

           (unknown)  (no       (unknown)  (unknown)  Pregnancy type::  (units  

  (unknown)



                    date)                         Other Normal unknown)  



                                                  Pregnancy           

 

           (unknown)  (no       (unknown)  (unknown)  Prenatal  (units    (unkno

wn)



                    date)                         Education unknown)  

 

           (unknown)  (no       (unknown)  (unknown)  Prenatal Initial  (units  

  (unknown)



                    date)                         Assessment unknown)  

 

           (unknown)  (no       (unknown)  (unknown)  Prenatal  (units    (unkno

wn)



                    date)                         Specific  unknown)  



                                                  Issues/Plans           

 

           (unknown)  (no       (unknown)  (unknown)  Prenatal  (units    (unkno

wn)



                    date)                         Testing:  unknown)  



                                                  discussed           

 

           (unknown)  (no       (unknown)  (unknown)  Prenatal Visit  (units    

(unknown)



                    date)                                   unknown)  

 

           (unknown)  (no       (unknown)  (unknown)  Prenatal  (units    (unkno

wn)



                    date)                         education packet: unknown)  



                                                  Child birth           



                                                  education/plan,           



                                                  Pregnancy           



                                                  symptoms,           

 

           (unknown)  (no       (unknown)  (unknown)  Primary Care  (units    (u

nknown)



                    date)                         Provider: BASIM Escobar unknown)  



                                                  [facility]              

 

           (unknown)  (no       (unknown)  (unknown)  Primary Ob  (units    (unk

nown)



                    date)                         Provider: unknown)  



                                                  Julieta Au           

 

           (unknown)  (no       (unknown)  (unknown)  Prior     (units    (unkno

wn)



                    date)                         GBS-Infected unknown)  



                                                  child: No           

 

           (unknown)  (no       (unknown)  (unknown)  Providers  (units    (unkn

own)



                    date)                                   unknown)  

 

           (unknown)  (no       (unknown)  (unknown)  Pt presents for  (units   

 (unknown)



                    date)                         a PNV at 16+6 unknown)  



                                                  wks. No FM. No           



                                                  LOF/VB. Rec'd flu           



                                                  shot                

 

           (unknown)  (no       (unknown)  (unknown)  Rash or viral  (units    (

unknown)



                    date)                         illness since unknown)  



                                                  last menstrual           



                                                  period: No           

 

           (unknown)  (no       (unknown)  (unknown)  Rash      (units    (unkno

wn)



                    date)                                   unknown)  

 

           (unknown)  (no       (unknown)  (unknown)  Reason For Visit  (units  

  (unknown)



                    date)                                   unknown)  

 

           (unknown)  (no       (unknown)  (unknown)  Recent travel  (units    (

unknown)



                    date)                         outside of unknown)  



                                                  country?: No           

 

           (unknown)  (no       (unknown)  (unknown)  Recurrent  (units    (unkn

own)



                    date)                         pregnancy loss or unknown)  



                                                  a stillbirth: Yes           

 

           (unknown)  (no       (unknown)  (unknown)  Reports over the  (units  

  (unknown)



                    date)                         counter   unknown)  



                                                  medications           



                                                  (diclofenac),           



                                                  Denies alcohol,           



                                                  Denies              

 

           (unknown)  (no       (unknown)  (unknown)  Safety    (units    (unkno

wn)



                    date)                                   unknown)  

 

           (unknown)  (no       (unknown)  (unknown)  Second Trimester  (units  

  (unknown)



                    date)                         Education unknown)  



                                                  Checklist           

 

           (unknown)  (no       (unknown)  (unknown)  Selecting a  (units    (un

known)



                    date)                          care unknown)  



                                                  provider: further           



                                                  discussion needed           

 

           (unknown)  (no       (unknown)  (unknown)  She is at 22  (units    (u

nknown)



                    date)                         weeks 6 days. She unknown)  



                                                  has felt good           



                                                  fetal movement.           



                                                  She says it is           

 

           (unknown)  (no       (unknown)  (unknown)  Shingles  (units    (unkno

wn)



                    date)                                   unknown)  

 

           (unknown)  (no       (unknown)  (unknown)  Signed By:  (units    (unk

nown)



                    date)                                   unknown)  

 

           (unknown)  (no       (unknown)  (unknown)  Signs and  (units    (unkn

own)



                    date)                         symptoms of unknown)  



                                                   labor:           



                                                  discussed           

 

           (unknown)  (no       (unknown)  (unknown)  Smoking Status:  (units   

 (unknown)



                    date)                         Never smoker unknown)  

 

           (unknown)  (no       (unknown)  (unknown)  Social History  (units    

(unknown)



                    date)                                   unknown)  

 

           (unknown)  (no       (unknown)  (unknown)  Support   (units    (unkno

wn)



                    date)                         Person(s):: [place] unknown)  

 

           (unknown)  (no       (unknown)  (unknown)  Surgical History  (units  

  (unknown)



                    date)                         (Updated 09/10/22 unknown)  



                                                  @ 21:46 by Fatoumata Flores)             

 

           (unknown)  (no       (unknown)  (unknown)  Surrogate  (units    (unkn

own)



                    date)                         pregnancy?: no unknown)  

 

           (unknown)  (no       (unknown)  (unknown)  Symptoms since  (units    

(unknown)



                    date)                         LMP: Reports unknown)  



                                                  amenorrhea,           



                                                  nausea, fatigue,           



                                                  breast              



                                                  tenderness,           

 

           (unknown)  (no       (unknown)  (unknown)  Tdap Adult  (units    (unk

nown)



                    date)                         (Adacel) Today unknown)  



                                                  Z23 - Encounter           



                                                  for immunization           

 

           (unknown)  (no       (unknown)  (unknown)  Teratogen  (units    (unkn

own)



                    date)                         Exposures since unknown)  



                                                  LMP/Conception:           



                                                  Denies              



                                                  prescription           



                                                  medications,           

 

           (unknown)  (no       (unknown)  (unknown)  Testing   (units    (unkno

wn)



                    date)                         Education unknown)  

 

           (unknown)  (no       (unknown)  (unknown)  Testing   (units    (unkno

wn)



                    date)                         education unknown)  



                                                  completed: group           



                                                  B strep, Spina           



                                                  bifida testing           



                                                  and Cell Free           

 

           (unknown)  (no       (unknown)  (unknown)  This note may  (units    (

unknown)



                    date)                         have been all or unknown)  



                                                  partially           



                                                  generated using           



                                                  voice recognition           

 

           (unknown)  (no       (unknown)  (unknown)  Tobacco +  (units    (unkn

own)



                    date)                         Substance Use unknown)  

 

           (unknown)  (no       (unknown)  (unknown)  Tobacco Status  (units    

(unknown)



                    date)                                   unknown)  

 

           (unknown)  (no       (unknown)  (unknown)  Trimester:: 3rd  (units   

 (unknown)



                    date)                         Trimester unknown)  



                                                  (28wks-Del)           

 

           (unknown)  (no       (unknown)  (unknown)  Tumor (-10/2012)  (units  

  (unknown)



                    date)                                   unknown)  

 

           (unknown)  (no       (unknown)  (unknown)  Type(s) of  (units    (unk

nown)



                    date)                         exercise: unknown)  



                                                  bicycling           



                                                  (stationary           



                                                  bike), regular           



                                                  exercise, weight           

 

           (unknown)  (no       (unknown)  (unknown) UProtein Movement  (units  

  (unknown)



                    date)                         PreLabor FHR Fndl unknown)  



                                                  Ht Pres Edema           



                                                  Cerv Exam           



                                                  US/Comment Next           



                                                  Appt                

 

           (unknown)  (no       (unknown)  (unknown)  Ultrasound  (units    (unk

nown)



                    date)                         performed?: Yes unknown)  

 

           (unknown)  (no       (unknown)  (unknown)  VIS Given Date  (units    

(unknown)



                    date)                         VIS Provided VIS unknown)  



                                                  Publication Date           

 

           (unknown)  (no       (unknown)  (unknown)  Varicella/chicke  (units  

  (unknown)



                    date)                         n pox status: unknown)  



                                                  previous disease           

 

           (unknown)  (no       (unknown)  (unknown)  Visit Date:  (units    (un

known)



                    date)                         23 Last unknown)  



                                                  Updated by:           



                                                  Julieta Au MD                  

 

           (unknown)  (no       (unknown)  (unknown)  Visit Date:  (units    (un

known)



                    date)                         22 Last unknown)  



                                                  Updated by:           



                                                  Julieta Au MD                  

 

           (unknown)  (no       (unknown)  (unknown)  Visit Date:  (units    (un

known)



                    date)                         10/11/22 Last unknown)  



                                                  Updated by:           



                                                  Fanny Baker MD                  

 

           (unknown)  (no       (unknown)  (unknown)  Visit Date:  (units    (un

known)



                    date)                         22 Last unknown)  



                                                  Updated by:           



                                                  Julieta Au MD                  

 

           (unknown)  (no       (unknown)  (unknown)  Visit Date:  (units    (un

known)



                    date)                         22 Last unknown)  



                                                  Updated by: Berta Salinas P.A-C           

 

           (unknown)  (no       (unknown)  (unknown)  Visit Reasons:  (units    

(unknown)



                    date)                         OB w 3D US unknown)  

 

           (unknown)  (no       (unknown)  (unknown)  Vitals    (units    (unkno

wn)



                    date)                                   unknown)  

 

           (unknown)  (no       (unknown)  (unknown)  Vitamins and  (units    (u

nknown)



                    date)                         iron, Diet and unknown)  



                                                  weight gain, Fish           



                                                  and mercury           



                                                  intake, Caffeine           



                                                  use,                

 

           (unknown)  (no       (unknown)  (unknown)  WG        (units    (unkno

wn)



                    date)                                   unknown)  

 

           (unknown)  (no       (unknown)  (unknown)  Weeks     (units    (unkno

wn)



                    date)                         gestation:: 28 unknown)  

 

           (unknown)  (no       (unknown)  (unknown)  Weight 177 lb  (units    (

unknown)



                    date)                                   unknown)  

 

           (unknown)  (no       (unknown)  (unknown)  [place] teeth  (units    (u

nknown)



                    date)                         extracted unknown)  

 

           (unknown)  (no       (unknown)  (unknown)  Yes no 144 29  (units    (

unknown)



                    date)                         Vertex absent AGA unknown)  



                                                  29w5d               

 

           (unknown)  (no       (unknown)  (unknown)  Zika virus  (units    (unk

nown)



                    date)                         exposure: No unknown)  

 

           (unknown)  (no       (unknown)  (unknown)  accompanied by  (units    

(unknown)



                    date)                         her . She unknown)  



                                                  went through           



                                                  fertility           



                                                  treatments with           

 

           (unknown)  (no       (unknown)  (unknown)  alcohol intake:  (units   

 (unknown)



                    date)                         never     unknown)  

 

           (unknown)  (no       (unknown)  (unknown)  anterior  (units    (unkno

wn)



                    date)                         placenta. Routine unknown)  



                                                  precautions           



                                                  reviewed with the           



                                                  patient. Due to           



                                                  1st                 

 

           (unknown)  (no       (unknown)  (unknown)  anyone in either  (units  

  (unknown)



                    date)                         family with: unknown)  

 

           (unknown)  (no       (unknown)  (unknown)  baby's father  (units    (

unknown)



                    date)                         had a child with unknown)  



                                                  birth defects not           



                                                  listed above and           



                                                  Denies Other           

 

           (unknown)  (no       (unknown)  (unknown)  back pain.  (units    (unk

nown)



                    date)                         Advised   unknown)  



                                                  stretching, belly           



                                                  band, and PT if           



                                                  not improving.           



                                                  AFP was             

 

           (unknown)  (no       (unknown)  (unknown)  blood sugar  (units    (un

known)



                    date)                         diagnostic (Blood unknown)  



                                                  Glucose Test           



                                                  strips) #100 ea           



                                                  23 [Rx           

 

           (unknown)  (no       (unknown)  (unknown)  blood-glucose  (units    (

unknown)



                    date)                         meter #1 ea unknown)  



                                                  23 [Rx           



                                                  Confirmed           



                                                  23]           

 

           (unknown)  (no       (unknown)  (unknown)  by ultrasound is  (units  

  (unknown)



                    date)                         normal with good unknown)  



                                                  fetal movement,           



                                                  normal appearing           



                                                  fluid,              

 

           (unknown)  (no       (unknown)  (unknown)  caffeine: Yes  (units    (

unknown)



                    date)                         (1-2 cups unknown)  



                                                  tea/day)            

 

           (unknown)  (no       (unknown)  (unknown)  carbon monox  (units    (u

nknown)



                    date)                         detector in home: unknown)  



                                                  Yes                 

 

           (unknown)  (no       (unknown)  (unknown)  cfDNA normal  (units    (u

nknown)



                    date)                         female, AFP unknown)  



                                                  negative            

 

           (unknown)  (no       (unknown)  (unknown)  consistent with  (units   

 (unknown)



                    date)                         9 weeks 0 days. unknown)  



                                                  Yolk sac visible.           



                                                  Fetal heart rate           



                                                  178 beats           

 

           (unknown)  (no       (unknown)  (unknown)  current   (units    (unkno

wn)



                    date)                         occupational unknown)  



                                                  exposures/hazards           



                                                  : No                

 

           (unknown)  (no       (unknown)  (unknown)  daily servings  (units    

(unknown)



                    date)                         fruits/ve-4 unknown)  

 

           (unknown)  (no       (unknown)  (unknown)  described, visit  (units  

  (unknown)



                    date)                         schedule  unknown)  



                                                  reviewed,           



                                                  ultrasounds           



                                                  policy reviewed,           



                                                  coverage 24           

 

           (unknown)  (no       (unknown)  (unknown)  developing a  (units    (u

nknown)



                    date)                         pattern. No unknown)  



                                                  leakage of fluid           



                                                  or vaginal           



                                                  bleeding. No           



                                                  contractions           

 

           (unknown)  (no       (unknown)  (unknown)  discussed,  (units    (unk

nown)



                    date)                         tuberculosis unknown)  



                                                  exposure            



                                                  discussed, CMV           



                                                  discussed,           



                                                  Toxoplasmosis           

 

           (unknown)  (no       (unknown)  (unknown)  disorders,  (units    (unk

nown)



                    date)                         Denies Cystic unknown)  



                                                  Fibrosis, Denies           



                                                  Mental              



                                                  Retardation/Autis           



                                                  m, Denies           

 

           (unknown)  (no       (unknown)  (unknown)  do you feel safe  (units  

  (unknown)



                    date)                         at home: Yes unknown)  

 

           (unknown)  (no       (unknown)  (unknown)  during the past  (units   

 (unknown)



                    date)                         year weight has: unknown)  



                                                  remained stable           

 

           (unknown)  (no       (unknown)  (unknown)  education level:  (units  

  (unknown)



                    date)                         college   unknown)  



                                                  (Associate's           



                                                  degree)             

 

           (unknown)  (no       (unknown)  (unknown)  enies Maternal  (units    

(unknown)



                    date)                         Metabolic unknown)  



                                                  Disorder (EG,TYPE           



                                                  1 Diabetes, PKU),           



                                                  Denies Patient or           

 

           (unknown)  (no       (unknown)  (unknown)  exposure,  (units    (unkn

own)



                    date)                         Medication use, unknown)  



                                                  Sauna/hot tub           



                                                  use, Dental care,           



                                                  Travel and           



                                                  Influenza           

 

           (unknown)  (no       (unknown)  (unknown)  fire      (units    (unkno

wn)



                    date)                         extinguisher in unknown)  



                                                  home: Yes           

 

           (unknown)  (no       (unknown)  (unknown)  firearms in  (units    (un

known)



                    date)                         home: Yes unknown)  



                                                  firearms unloaded           



                                                  and locked: Yes           

 

           (unknown)  (no       (unknown)  (unknown)  frequency: 5-6  (units    

(unknown)



                    date)                         times per week unknown)  

 

           (unknown)  (no       (unknown)  (unknown)  gestational sac  (units   

 (unknown)



                    date)                         with a fetus with unknown)  



                                                  a crown-rump           



                                                  length measuring           



                                                  2.29 cm             

 

           (unknown)  (no       (unknown)  (unknown)  have occurred.  (units    

(unknown)



                    date)                         If there are any unknown)  



                                                  questions, please           



                                                  contact the           



                                                  Medical Records           

 

           (unknown)  (no       (unknown)  (unknown)  hours a day and  (units   

 (unknown)



                    date)                         participation of unknown)  



                                                  father in           



                                                  prenatal care and           



                                                  office visits           

 

           (unknown)  (no       (unknown)  (unknown)  household  (units    (unkn

own)



                    date)                         members: spouse unknown)  



                                                  and family           



                                                  (father and           



                                                  father-in-law)           

 

           (unknown)  (no       (unknown)  (unknown)  housing: house  (units    

(unknown)



                    date)                                   unknown)  

 

           (unknown)  (no       (unknown)  (unknown)  illicit drugs  (units    (

unknown)



                    date)                         and Denies other unknown)  

 

           (unknown)  (no       (unknown)  (unknown)  kag       (units    (unkno

wn)



                    date)                                   unknown)  

 

           (unknown)  (no       (unknown)  (unknown)  lancets #100 ea  (units   

 (unknown)



                    date)                         23 [Rx unknown)  



                                                  Confirmed           



                                                  23]           

 

           (unknown)  (no       (unknown)  (unknown)  last visit.  (units    (un

known)



                    date)                         Plan: AFP today. unknown)  



                                                  20 wk u/s           



                                                  reviewed. F/U 4           



                                                  wks. Warning           



                                                  signs               

 

           (unknown)  (no       (unknown)  (unknown)  lifting and  (units    (un

known)



                    date)                         other (hiking) unknown)  

 

           (unknown)  (no       (unknown)  (unknown)  lives     (units    (unkno

wn)



                    date)                         independently: unknown)  



                                                  Yes                 

 

           (unknown)  (no       (unknown)  (unknown)  marital status:  (units   

 (unknown)



                    date)                            unknown)  

 

           (unknown)  (no       (unknown)  (unknown)  may occur.  (units    (unk

nown)



                    date)                         Occasional unknown)  



                                                  wrong-word or           



                                                  'sound-alike'           



                                                  substitutions may           



                                                  have                

 

           (unknown)  (no       (unknown)  (unknown)  medication only.  (units  

  (unknown)



                    date)                         Had 7     unknown)  



                                                  miscarriages. No           



                                                  bleeding with           



                                                  this pregnancy.           



                                                  This 1              

 

           (unknown)  (no       (unknown)  (unknown)  movement and  (units    (u

nknown)



                    date)                         denies VB, LOF, unknown)  



                                                  cramping and           



                                                  regular             



                                                  contractions. Pt           



                                                  reports low           

 

           (unknown)  (no       (unknown)  (unknown)  negative.  (units    (unkn

own)



                    date)                         Anatomy US unknown)  



                                                  scheduled for           



                                                  later today.           



                                                  Warning             



                                                  precautions           



                                                  reviewed.           

 

           (unknown)  (no       (unknown)  (unknown)  nickel Allergy  (units    

(unknown)



                    date)                         (Mild, Verified unknown)  



                                                  23 13:15)           

 

           (unknown)  (no       (unknown)  (unknown)  number of  (units    (unkn

own)



                    date)                         children: 0 unknown)  

 

           (unknown)  (no       (unknown)  (unknown)  occupational  (units    (u

nknown)



                    date)                         status: employed unknown)  



                                                  (active duty           



                                                  avionics            



                                                  ,           



                                                  [employer] job            

 

           (unknown)  (no       (unknown)  (unknown)  occurred due to  (units   

 (unknown)



                    date)                         the inherent unknown)  



                                                  limitations of           



                                                  voice recognition           



                                                  software. Please           

 

           (unknown)  (no       (unknown)  (unknown)  or cramping.  (units    (u

nknown)



                    date)                         Plan: 1 hour unknown)  



                                                  glucose ordered.           



                                                  Follow-up in 4           



                                                  weeks. Warning           

 

           (unknown)  (no       (unknown)  (unknown)  per minute.  (units    (un

known)



                    date)                         Normal ovaries unknown)  



                                                  bilaterally.           



                                                  Plan: Follow-up           



                                                  in 4 weeks.           



                                                  Warning             

 

           (unknown)  (no       (unknown)  (unknown)  pets and  (units    (unkno

wn)



                    date)                         animals: Yes (1 unknown)  



                                                  large dog,           



                                                  chickens)           

 

           (unknown)  (no       (unknown)  (unknown)  precautions,  (units    (u

nknown)



                    date)                         Listeriosis unknown)  



                                                  prevention and           



                                                  Rubella             



                                                  Immunization           

 

           (unknown)  (no       (unknown)  (unknown)  preeclampsia.  (units    (

unknown)



                    date)                                   unknown)  

 

           (unknown)  (no       (unknown)  (unknown)  pregnancy  (units    (unkn

own)



                    date)                         discussed unknown)  



                                                  starting baby           



                                                  aspirin per day           



                                                  to decrease her           



                                                  risk for            

 

           (unknown)  (no       (unknown)  (unknown)  prenat.vits,nan,  (units  

  (unknown)



                    date)                         min-iron-folic 1 unknown)  



                                                  tab PO DAILY           



                                                  22 [History           



                                                  Confirmed           

 

           (unknown)  (no       (unknown)  (unknown)  read the note  (units    (

unknown)



                    date)                         carefully and unknown)  



                                                  recognize, using           



                                                  context, where           



                                                  these               



                                                  substitutions           

 

           (unknown)  (no       (unknown)  (unknown)  reviewed.  (units    (unkn

own)



                    date)                                   unknown)  

 

           (unknown)  (no       (unknown)  (unknown)  seatbelt use:  (units    (

unknown)



                    date)                         always    unknown)  

 

           (unknown)  (no       (unknown)  (unknown)  second hand  (units    (un

known)



                    date)                         exposure: Yes unknown)  



                                                  (father-in-law           



                                                  smokes outside           



                                                  the house)           

 

           (unknown)  (no       (unknown)  (unknown)  signs reviewed.  (units   

 (unknown)



                    date)                         cfDNA ordered. unknown)  

 

           (unknown)  (no       (unknown)  (unknown)  signs reviewed.  (units   

 (unknown)



                    date)                                   unknown)  

 

           (unknown)  (no       (unknown)  (unknown)  software.  (units    (unkn

own)



                    date)                         Although every unknown)  



                                                  effort is made to           



                                                  edit content,           



                                                  transcription           



                                                  errors              

 

           (unknown)  (no       (unknown)  (unknown)  special madi  (units    (

unknown)



                    date)                         needs: No unknown)  

 

           (unknown)  (no       (unknown)  (unknown)  substance use  (units    (

unknown)



                    date)                         type: does not unknown)  



                                                  use                 

 

           (unknown)  (no       (unknown)  (unknown)  travel history:  (units   

 (unknown)



                    date)                         over 6 months ago unknown)  

 

           (unknown)  (no       (unknown)  (unknown)  urinary   (units    (unkno

wn)



                    date)                         frequency and unknown)  



                                                  irritability           

 

           (unknown)  (no       (unknown)  (unknown)  vaccine (Annual  (units   

 (unknown)



                    date)                         flu, Phizer Covid unknown)  



                                                  x2)                 

 

           (unknown)  (no       (unknown)  (unknown)  w0d 4 wks  (units    (unkn

own)



                    date)                                   unknown)  

 

           (unknown)  (no       (unknown)  (unknown)  was not using  (units    (

unknown)



                    date)                         any infertility unknown)  



                                                  medications.           



                                                  Ultrasound: An           



                                                  intrauterine           

 

           (unknown)  (no       (unknown)  (unknown)  water heater  (units    (u

nknown)



                    date)                         temp set < 120 unknown)  



                                                  deg: Yes            

 

           (unknown)  (no       (unknown)  (unknown)  well-balanced  (units    (

unknown)



                    date)                         diet: daily or unknown)  



                                                  most days           

 

           (unknown)  (no       (unknown)  (unknown)  went away with  (units    

(unknown)



                    date)                         laying down. No unknown)  



                                                  vaginal bleeding.           



                                                  No fevers. Fetal           



                                                  heart tone           

 

           (unknown)  (no       (unknown)  (unknown)  while pregnant)  (units   

 (unknown)



                    date)                                   unknown)  

 

           (unknown)  (no       (unknown)  (unknown)  working smoke  (units    (

unknown)



                    date)                         detector in home: unknown)  



                                                  Yes                 









                                         Result panel 16









           (unknown)  (no       (unknown)  (unknown)  (no value)  (units    (unk

nown)



                    date)                                   unknown)  

 

           (unknown)  (no       (unknown)  (unknown)  (+12 lb) 120/58  (units   

 (unknown)



                    date)                         N         unknown)  

 

           (unknown)  (no       (unknown)  (unknown)  (+13 lb) 104/54  (units   

 (unknown)



                    date)                         N         unknown)  

 

           (unknown)  (no       (unknown)  (unknown)  (+18 lb) 122/60  (units   

 (unknown)



                    date)                         N         unknown)  

 

           (unknown)  (no       (unknown)  (unknown)  (+22 lb) 110/62  (units   

 (unknown)



                    date)                                   unknown)  

 

           (unknown)  (no       (unknown)  (unknown)  (+7 lb) 128/66 N  (units  

  (unknown)



                    date)                                   unknown)  

 

           (unknown)  (no       (unknown)  (unknown)  (+8 lb) 122/68 N  (units  

  (unknown)



                    date)                                   unknown)  

 

           (unknown)  (no       (unknown)  (unknown)  **Genetic  (units    (unkn

own)



                    date)                         Screening/Teratol unknown)  



                                                  ogy Counseling -           



                                                  Includes patient,           



                                                  baby's father, or           

 

           (unknown)  (no       (unknown)  (unknown)  -?-?-?-?-?-?-?-?  (units  

  (unknown)



                    date)                         -?-?-?-?  unknown)  

 

           (unknown)  (no       (unknown)  (unknown)  0.5 mL IM Right  (units   

 (unknown)



                    date)                         Deltoid [id number] unknown)  



                                                  24            



                                                  [id number]           



                                                  SAN[employer]PASTEUR           

 

           (unknown)  (no       (unknown)  (unknown)  23  (units    (unkno

wn)



                    date)                                   unknown)  

 

           (unknown)  (no       (unknown)  (unknown)  23 Single  (units   

 (unknown)



                    date)                         Vaccine 21 unknown)  

 

           (unknown)  (no       (unknown)  (unknown)  23  (units    (unkno

wn)



                    date)                                   unknown)  

 

           (unknown)  (no       (unknown)  (unknown)  23]  (units    (unkn

own)



                    date)                                   unknown)  

 

           (unknown)  (no       (unknown)  (unknown)  04/15/23  (units    (unkno

wn)



                    date)                         Ultrasound #1 29w unknown)  



                                                  3d                  

 

           (unknown)  (no       (unknown)  (unknown)  [id number]   (units    (unkno

wn)



                    date)                                   unknown)  

 

           (unknown)  (no       (unknown)  (unknown)  22  (units    (unkno

wn)



                    date)                                   unknown)  

 

           (unknown)  (no       (unknown)  (unknown)  10/11/22  (units    (unkno

wn)



                    date)                                   unknown)  

 

           (unknown)  (no       (unknown)  (unknown)  22  (units    (unkno

wn)



                    date)                                   unknown)  

 

           (unknown)  (no       (unknown)  (unknown)  22  (units    (unkno

wn)



                    date)                                   unknown)  

 

           (unknown)  (no       (unknown)  (unknown)  12w 6d 163 lb  (units    (

unknown)



                    date)                                   unknown)  

 

           (unknown)  (no       (unknown)  (unknown)  13:15     (units    (unkno

wn)



                    date)                                   unknown)  

 

           (unknown)  (no       (unknown)  (unknown)  16w 6d 167 lb  (units    (

unknown)



                    date)                                   unknown)  

 

           (unknown)  (no       (unknown)  (unknown)  20w 5d 168 lb  (units    (

unknown)



                    date)                                   unknown)  

 

           (unknown)  (no       (unknown)  (unknown)  24w 6d 173 lb  (units    (

unknown)



                    date)                                   unknown)  

 

           (unknown)  (no       (unknown)  (unknown)  28w 6d 177 lb  (units    (

unknown)



                    date)                                   unknown)  

 

           (unknown)  (no       (unknown)  (unknown)  3 wks     (units    (unkno

wn)



                    date)                                   unknown)  

 

           (unknown)  (no       (unknown)  (unknown)  4 wk      (units    (unkno

wn)



                    date)                                   unknown)  

 

           (unknown)  (no       (unknown)  (unknown)  8w 6d 162 lb  (units    (u

nknown)



                    date)                                   unknown)  

 

           (unknown)  (no       (unknown)  (unknown)  Abnormal lab  (units    (u

nknown)



                    date)                         values 1st unknown)  



                                                  trimester:           



                                                  discussed           

 

           (unknown)  (no       (unknown)  (unknown)  Abnormal lab  (units    (u

nknown)



                    date)                         values 2nd unknown)  



                                                  trimester:           



                                                  discussed           

 

           (unknown)  (no       (unknown)  (unknown)  Adacel(Tdap  (units    (un

known)



                    date)                         Adolesn/Adult)(PF unknown)  



                                                  )                   

 

           (unknown)  (no       (unknown)  (unknown)  Add'l Plan  (units    (unk

nown)



                    date)                         Details   unknown)  

 

           (unknown)  (no       (unknown)  (unknown)  Administered by:  (units  

  (unknown)



                    date)                         Edelmira Montero unknown)  



                                                  MA on 23           



                                                  13:25               

 

           (unknown)  (no       (unknown)  (unknown)  Age/Sex: 27 / F  (units   

 (unknown)



                    date)                         Date of Service: unknown)  

 

           (unknown)  (no       (unknown)  (unknown)  Allergies  (units    (unkn

own)



                    date)                         (-)   unknown)  

 

           (unknown)  (no       (unknown)  (unknown)  Allergies  (units    (unkn

own)



                    date)                                   unknown)  

 

           (unknown)  (no       (unknown)  (unknown)  [place]  (units    (

unknown)



                    date)                         [id number]     unknown)  

 

           (unknown)  (no       (unknown)  (unknown)  Anesthesia  (units    (unk

nown)



                    date)                                   unknown)  

 

           (unknown)  (no       (unknown)  (unknown)  Aneuploidy  (units    (unk

nown)



                    date)                         Screening unknown)  



                                                  Offered: Accepted           



                                                  (wants CFDNA)           

 

           (unknown)  (no       (unknown)  (unknown)  Anticipated  (units    (un

known)



                    date)                         course of unknown)  



                                                  prenatal care:           



                                                  discussed           

 

           (unknown)  (no       (unknown)  (unknown)  Anxiety (-2016)  (units   

 (unknown)



                    date)                                   unknown)  

 

           (unknown)  (no       (unknown)  (unknown)  Arrhythmia  (units    (unk

nown)



                    date)                                   unknown)  

 

           (unknown)  (no       (unknown)  (unknown)  Assessment and  (units    

(unknown)



                    date)                         Plan      unknown)  

 

           (unknown)  (no       (unknown)  (unknown)  Attending Dr:  (units    (

unknown)



                    date)                         Julieta Au unknown)  



                                                  MD                  

 

           (unknown)  (no       (unknown)  (unknown)  Libra presents  (units   

 (unknown)



                    date)                         today for routine unknown)  



                                                  OB f/u at 20w5d.           



                                                  She reports good           



                                                  fetal               

 

           (unknown)  (no       (unknown)  (unknown)  BMI 28.5  (units    (unkno

wn)



                    date)                                   unknown)  

 

           (unknown)  (no       (unknown)  (unknown)  /62  (units    (unkn

own)



                    date)                                   unknown)  

 

           (unknown)  (no       (unknown)  (unknown)  Birth     (units    (unkno

wn)



                    date)                         Plan/Preferences unknown)  

 

           (unknown)  (no       (unknown)  (unknown)  Birth Planning  (units    

(unknown)



                    date)                                   unknown)  

 

           (unknown)  (no       (unknown)  (unknown)  Blood Pressure  (units    

(unknown)



                    date)                         Location Lt unknown)  



                                                  brachial            

 

           (unknown)  (no       (unknown)  (unknown)  Blood     (units    (unkno

wn)



                    date)                         transfusions?: unknown)  



                                                  yes (Never had           



                                                  but would accept)           

 

           (unknown)  (no       (unknown)  (unknown)  Breastfeeding:  (units    

(unknown)



                    date)                         discussed unknown)  

 

           (unknown)  (no       (unknown)  (unknown)  Chicken pox  (units    (un

known)



                    date)                         ()   unknown)  

 

           (unknown)  (no       (unknown)  (unknown)  Childbirth  (units    (unk

nown)



                    date)                         Classes:  unknown)  



                                                  discussed           

 

           (unknown)  (no       (unknown)  (unknown)  Conceived  (units    (unkn

own)



                    date)                         naturally this unknown)  



                                                  pregnancy           

 

           (unknown)  (no       (unknown)  (unknown)  Confirmed  (units    (unkn

own)



                    date)                         23] unknown)  

 

           (unknown)  (no       (unknown)  (unknown)  Current Estimate  (units  

  (unknown)



                    date)                         23  unknown)  



                                                  Ultrasound #2 28w           



                                                  6d                  

 

           (unknown)  (no       (unknown)  (unknown)  Current   (units    (unkno

wn)



                    date)                         Pregnancy History unknown)  

 

           (unknown)  (no       (unknown)  (unknown)  DNA       (units    (unkno

wn)



                    date)                                   unknown)  

 

           (unknown)  (no       (unknown)  (unknown)  : 1995  (units   

 (unknown)



                    date)                         Acct:[id number] unknown)  

 

           (unknown)  (no       (unknown)  (unknown)  Date of positive  (units  

  (unknown)



                    date)                         home pregnancy unknown)  



                                                  test: 08/15/22           

 

           (unknown)  (no       (unknown)  (unknown)  Date      (units    (unkno

wn)



                    date)                                   unknown)  

 

           (unknown)  (no       (unknown)  (unknown) Denies Congenital  (units  

  (unknown)



                    date)                         Heart Defect, unknown)  



                                                  Denies Down           



                                                  Syndrome, Denies           



                                                  Muscular            



                                                  Dystrophy,           

 

           (unknown)  (no       (unknown)  (unknown)  Denies Maternal  (units   

 (unknown)



                    date)                         Metabolic unknown)  



                                                  Disorder (EG,TYPE           



                                                  1 Diabetes, PKU),           



                                                  Denies Patient or           

 

           (unknown)  (no       (unknown)  (unknown)  Denies Neural  (units    (

unknown)



                    date)                         Tube Defect unknown)  



                                                  (Meningomyelocele           



                                                  , Spina Bifida,           



                                                  or Anencephaly),           

 

           (unknown)  (no       (unknown)  (unknown)  Denies Sickle  (units    (

unknown)



                    date)                         Cell Disease or unknown)  



                                                  Trait (),           



                                                  Denies Hemophilia           



                                                  or other blood           

 

           (unknown)  (no       (unknown)  (unknown)  Denies [place]  (units  

  (unknown)



                    date)                         (Ashkenazi unknown)  



                                                  [employer], [place],           



                                                  [place]),           



                                                  Denies [place]           

 

           (unknown)  (no       (unknown)  (unknown)  Depression  (units    (unk

nown)



                    date)                         (-2016)   unknown)  

 

           (unknown)  (no       (unknown)  (unknown)  Depression:  (units    (un

known)



                    date)                         discussed unknown)  

 

           (unknown)  (no       (unknown)  (unknown)  Dept at   (units    (unkno

wn)



                    date)                         [phone]. unknown)  

 

           (unknown)  (no       (unknown)  (unknown)  Diet and  (units    (unkno

wn)



                    date)                         Exercise  unknown)  

 

           (unknown)  (no       (unknown)  (unknown)  Disease   (units    (unkno

wn)



                    date)                         (Ashkenazi unknown)  



                                                  Advent), Denies           



                                                  Familial            



                                                  Dysautonomia           



                                                  (Ashkenazi           



                                                  Advent),            

 

           (unknown)  (no       (unknown)  (unknown)  Documented By:  (units    

(unknown)



                    date)                         Julieta Au unknown)  



                                                  MD 23 1314           

 

           (unknown)  (no       (unknown)  (unknown)  Dose Route Admin  (units  

  (unknown)



                    date)                         Location Lot unknown)  



                                                  Number Expiration           



                                                  Date NDC            

 

           (unknown)  (no       (unknown)  (unknown)  [employer]     (units    (unkno

wn)



                    date)                                   unknown)  

 

           (unknown)  (no       (unknown)  (unknown)  MEHNAZ Calculator  (units    

(unknown)



                    date)                                   unknown)  

 

           (unknown)  (no       (unknown)  (unknown)  EGA Weight BP  (units    (

unknown)



                    date)                         UGlucose  unknown)  

 

           (unknown)  (no       (unknown)  (unknown)  Eczema (-2000)  (units    

(unknown)



                    date)                                   unknown)  

 

           (unknown)  (no       (unknown)  (unknown)  Eligibility  (units    (un

known)



                    date)                         Eligibility Date unknown)  



                                                  Funding Source           

 

           (unknown)  (no       (unknown)  (unknown)  Estimated  (units    (unkn

own)



                    date)                         Delivery Date unknown)  



                                                  Method Current           

 

           (unknown)  (no       (unknown)  (unknown)  Exercise and  (units    (u

nknown)



                    date)                         activity, unknown)  



                                                  work/environmenta           



                                                  l/hazards, Sexual           



                                                  activity, X-ray           

 

           (unknown)  (no       (unknown)  (unknown)  F/u in 4 wks.  (units    (

unknown)



                    date)                                   unknown)  

 

           (unknown)  (no       (unknown)  (unknown)  Family History  (units    

(unknown)



                    date)                         (Updated 09/10/22 unknown)  



                                                  @ 21:49 by Fatoumata Flores)             

 

           (unknown)  (no       (unknown)  (unknown)  Family/Other  (units    (u

nknown)



                    date)                         Multiple  unknown)  



                                                  sclerosis           

 

           (unknown)  (no       (unknown)  (unknown)  Father    (units    (unkno

wn)



                    date)                         Hypertension unknown)  

 

           (unknown)  (no       (unknown)  (unknown)  Father of Baby:  (units   

 (unknown)



                    date)                         same      unknown)  

 

           (unknown)  (no       (unknown)  (unknown)  [facility]  (units   

 (unknown)



                    date)                         Associates unknown)  

 

           (unknown)  (no       (unknown)  (unknown)  First Trimester  (units   

 (unknown)



                    date)                         Education unknown)  



                                                  Checklist           

 

           (unknown)  (no       (unknown)  (unknown)  Foot pain  (units    (unkn

own)



                    date)                         (-)   unknown)  

 

           (unknown)  (no       (unknown)  (unknown)   (SAB  (units    (unkn

own)



                    date)                         x7),Fertility Tx, unknown)  



                                                  meds only,           



                                                  started on baby           



                                                  aspirin             



                                                  10/11/2022           

 

           (unknown)  (no       (unknown)  (unknown)  GDM, on   (units    (unkno

wn)



                    date)                         Metformin 500mg unknown)  



                                                  BID                 

 

           (unknown)  (no       (unknown)  (unknown)  Genetic   (units    (unkno

wn)



                    date)                         Screening + unknown)  



                                                  Counseling           

 

           (unknown)  (no       (unknown)  (unknown)  Genetic   (units    (unkno

wn)



                    date)                         Screening unknown)  

 

           (unknown)  (no       (unknown)  (unknown)  Grandfather  (units    (un

known)



                    date)                          Cancer unknown)  

 

           (unknown)  (no       (unknown)  (unknown)  Grandfather  (units    (un

known)



                    date)                          Stroke unknown)  

 

           (unknown)  (no       (unknown)  (unknown)  Grandmother  (units    (un

known)



                    date)                          History unknown)  



                                                  of heart disease           

 

           (unknown)  (no       (unknown)  (unknown)  Grandmother  (units    (un

known)



                    date)                          Multiple unknown)  



                                                  sclerosis           

 

           (unknown)  (no       (unknown)  (unknown)   8  (units    (unkn

own)



                    date)                         Multiple births 0 unknown)  

 

           (unknown)  (no       (unknown)  (unknown)  HIV risk  (units    (unkno

wn)



                    date)                         evaluation: low unknown)  



                                                  risk                

 

           (unknown)  (no       (unknown)  (unknown)  [facility]  (units    (

unknown)



                    date)                         Education unknown)  

 

           (unknown)  (no       (unknown)  (unknown)  Health center  (units    (

unknown)



                    date)                         information: unknown)  



                                                  nature of           



                                                  practice            



                                                  discussed,           



                                                  prenatal            



                                                  personnel           

 

           (unknown)  (no       (unknown)  (unknown)  Height 5 ft 6 in  (units  

  (unknown)



                    date)                                   unknown)  

 

           (unknown)  (no       (unknown)  (unknown)  Hepatitis C risk  (units  

  (unknown)



                    date)                         evaluation: low unknown)  



                                                  risk                

 

           (unknown)  (no       (unknown)  (unknown)  History of  (units    (unk

nown)



                    date)                         Hepatitis B: No unknown)  

 

           (unknown)  (no       (unknown)  (unknown)  History of  (units    (unk

nown)



                    date)                         Hepatitis C: No unknown)  

 

           (unknown)  (no       (unknown)  (unknown)  History of  (units    (unk

nown)



                    date)                         recurrent unknown)  



                                                  miscarriages           

 

           (unknown)  (no       (unknown)  (unknown)  [facility]  (units    (u

nknown)



                    date)                                   unknown)  

 

           (unknown)  (no       (unknown)  (unknown)  [facility]  (units    (u

nknown)



                    date)                         Chorea, Denies unknown)  



                                                  Other inherited           



                                                  genetic or           



                                                  chromosomal           



                                                  disorder,           

 

           (unknown)  (no       (unknown)  (unknown)  , [place]  (units    (

unknown)



                    date)                         [place]  unknown)  

 

           (unknown)  (no       (unknown)  (unknown)  Hx #   (units    (u

nknown)



                    date)                         Pregnancies 0 unknown)  



                                                  Elective            



                                                  abortions 0           

 

           (unknown)  (no       (unknown)  (unknown)  Hx # Term  (units    (unkn

own)



                    date)                         Pregnancies 0 unknown)  



                                                  Ectopic             



                                                  pregnancies 0           

 

           (unknown)  (no       (unknown)  (unknown)  Immunizations  (units    (

unknown)



                    date)                                   unknown)  

 

           (unknown)  (no       (unknown)  (unknown)  Infant will be  (units    

(unknown)



                    date)                         adopted?: no unknown)  

 

           (unknown)  (no       (unknown)  (unknown)  Infection  (units    (unkn

own)



                    date)                         History   unknown)  

 

           (unknown)  (no       (unknown)  (unknown)  Infectious  (units    (unk

nown)



                    date)                         Disease Education unknown)  

 

           (unknown)  (no       (unknown)  (unknown)  Infectious  (units    (unk

nown)



                    date)                         disease exposure: unknown)  



                                                  chicken pox           



                                                  immunity            



                                                  discussed,           



                                                  hepatitis risk           

 

           (unknown)  (no       (unknown)  (unknown)  Infertility  (units    (un

known)



                    date)                         ()   unknown)  

 

           (unknown)  (no       (unknown)  (unknown)  Initial Weight:  (units   

 (unknown)



                    date)                         155 lb    unknown)  

 

           (unknown)  (no       (unknown)  (unknown)  Initials  (units    (unkno

wn)



                    date)                                   unknown)  

 

           (unknown)  (no       (unknown)  (unknown)  Intake Clinical  (units   

 (unknown)



                    date)                         Staff     unknown)  

 

           (unknown)  (no       (unknown)  (unknown)  Intake Note:  (units    (u

nknown)



                    date)                                   unknown)  

 

           (unknown)  (no       (unknown)  (unknown)  Intake performed  (units  

  (unknown)



                    date)                         by:       unknown)  



                                                  Edelmira Montero           

 

           (unknown)  (no       (unknown)  (unknown)  Intake    (units    (unkno

wn)



                    date)                                   unknown)  

 

           (unknown)  (no       (unknown)  (unknown)  Irregular  (units    (unkn

own)



                    date)                         menstrual cycle unknown)  



                                                  ()             

 

           (unknown)  (no       (unknown)  (unknown)  LM        (units    (unkno

wn)



                    date)                                   unknown)  

 

           (unknown)  (no       (unknown)  (unknown)  Lipoma    (units    (unkno

wn)



                    date)                                   unknown)  

 

           (unknown)  (no       (unknown)  (unknown)  Live with  (units    (unkn

own)



                    date)                         someone with TB unknown)  



                                                  or exposed to TB:           



                                                  No                  

 

           (unknown)  (no       (unknown)  (unknown)  Loc: [facility]  (units    (unkno

wn)



                    date)                                   unknown)  

 

           (unknown)  (no       (unknown)  (unknown)    (units    (u

nknown)



                    date)                                   unknown)  

 

           (unknown)  (no       (unknown)  (unknown)  Marital status:  (units   

 (unknown)



                    date)                            unknown)  

 

           (unknown)  (no       (unknown)  (unknown)  Medical History  (units   

 (unknown)



                    date)                         (Updated 22 unknown)  



                                                  @ 15:16 by           



                                                  Julieta Au MD)                 

 

           (unknown)  (no       (unknown)  (unknown)  Medications  (units    (un

known)



                    date)                                   unknown)  

 

           (unknown)  (no       (unknown)  (unknown)  Medications:  (units    (u

nknown)



                    date)                                   unknown)  

 

           (unknown)  (no       (unknown)  (unknown)  Mother Gastric  (units    

(unknown)



                    date)                         cancer    unknown)  

 

           (unknown)  (no       (unknown)  (unknown)  N No 142 13 N/A  (units   

 (unknown)



                    date)                         4wk       unknown)  

 

           (unknown)  (no       (unknown)  (unknown)  N No no 143 17  (units    

(unknown)



                    date)                         N/A absent AFP 4 unknown)  



                                                  wks                 

 

           (unknown)  (no       (unknown)  (unknown)  N No no 178 9  (units    (

unknown)



                    date)                         N/A absent unknown)  



                                                  long/closed AGA 9           

 

           (unknown)  (no       (unknown)  (unknown)  N Yes no 141 24  (units   

 (unknown)



                    date)                         N/A absent 4 wks unknown)  

 

           (unknown)  (no       (unknown)  (unknown)  N Yes no 142 20  (units   

 (unknown)



                    date)                         N/A absent AFP unknown)  



                                                  negative            

 

           (unknown)  (no       (unknown)  (unknown)  NF        (units    (unkno

wn)



                    date)                                   unknown)  

 

           (unknown)  (no       (unknown)  (unknown)  New       (units    (unkno

wn)



                    date)                                   unknown)  

 

           (unknown)  (no       (unknown)  (unknown)  Not VFC Eligible  (units  

  (unknown)



                    date)                         21 Private unknown)  



                                                  Funds               

 

           (unknown)  (no       (unknown)  (unknown)  Notes     (units    (unkno

wn)



                    date)                                   unknown)  

 

           (unknown)  (no       (unknown)  (unknown)  Number of Living  (units  

  (unknown)



                    date)                         Children 0 unknown)  

 

           (unknown)  (no       (unknown)  (unknown)  Number of  (units    (unkn

own)



                    date)                         fetuses:: Single unknown)  

 

           (unknown)  (no       (unknown)  (unknown)  Nutrition and  (units    (

unknown)



                    date)                         weight gain unknown)  



                                                  counseling:           



                                                  special diet:           



                                                  discussed           

 

           (unknown)  (no       (unknown)  (unknown)  OB Office Visit  (units   

 (unknown)



                    date)                                   unknown)  

 

           (unknown)  (no       (unknown)  (unknown)  OB Visit Log  (units    (u

nknown)



                    date)                                   unknown)  

 

           (unknown)  (no       (unknown)  (unknown)  OB check  (units    (unkno

wn)



                    date)                                   unknown)  

 

           (unknown)  (no       (unknown)  (unknown)  On birth control  (units  

  (unknown)



                    date)                         at conception?: unknown)  



                                                  No                  

 

           (unknown)  (no       (unknown)  (unknown)  Orders    (units    (unkno

wn)



                    date)                                   unknown)  

 

           (unknown)  (no       (unknown)  (unknown)  Orders:   (units    (unkno

wn)



                    date)                                   unknown)  

 

           (unknown)  (no       (unknown)  (unknown)  Other Estimates  (units   

 (unknown)



                    date)                         23 LMP unknown)  



                                                  (Certain) 31w 0d           

 

           (unknown)  (no       (unknown)  (unknown)  Ovarian cyst  (units    (u

nknown)



                    date)                         ()   unknown)  

 

           (unknown)  (no       (unknown)  (unknown)  PCOS (polycystic  (units  

  (unknown)



                    date)                         ovarian syndrome) unknown)  

 

           (unknown)  (no       (unknown)  (unknown)  PFSH      (units    (unkno

wn)



                    date)                                   unknown)  

 

           (unknown)  (no       (unknown)  (unknown)  Pap performed?:  (units   

 (unknown)



                    date)                         No        unknown)  

 

           (unknown)  (no       (unknown)  (unknown)  Para 0    (units    (unkno

wn)



                    date)                         Spontaneous unknown)  



                                                  abortions 7           

 

           (unknown)  (no       (unknown)  (unknown)  Partner history  (units   

 (unknown)



                    date)                         of STD: denies hx unknown)  

 

           (unknown)  (no       (unknown)  (unknown)  Partner history  (units   

 (unknown)



                    date)                         of genital unknown)  



                                                  herpes: No           

 

           (unknown)  (no       (unknown)  (unknown)  Partner: Franklin  (units    (

unknown)



                    date)                         [place]  unknown)  

 

           (unknown)  (no       (unknown)  (unknown)  Patient comes in  (units  

  (unknown)



                    date)                         for follow-up OB unknown)  



                                                  visit. She had           



                                                  some pelvic pain           



                                                  that                

 

           (unknown)  (no       (unknown)  (unknown)  Patient presents  (units  

  (unknown)



                    date)                         for a new OB unknown)  



                                                  visit at 8 weeks           



                                                  gestation. She is           

 

           (unknown)  (no       (unknown)  (unknown)  Patient presents  (units  

  (unknown)



                    date)                         for a routine unknown)  



                                                  prenatal visit,           



                                                  accompanied by           



                                                  her .           

 

           (unknown)  (no       (unknown)  (unknown)  Patient's age 33  (units  

  (unknown)



                    date)                         years or older as unknown)  



                                                  of estimated date           



                                                  of delivery: No           

 

           (unknown)  (no       (unknown)  (unknown)  Patient:  (units    (unkno

wn)



                    date)                         Libra Prieto unknown)  



                                                  MR#: [id number]            

 

           (unknown)  (no       (unknown)  (unknown)  Pediatrician:  (units    (

unknown)



                    date)                         DOD [place] vs unknown)  



                                                  [facility]           



                                                  [facility]           



                                                  [place]             

 

           (unknown)  (no       (unknown)  (unknown)  Performing  (units    (unk

nown)



                    date)                         Provider: unknown)  



                                                  Julieta Au MD                  

 

           (unknown)  (no       (unknown)  (unknown)  Personal history  (units  

  (unknown)



                    date)                         of STD: denies hx unknown)  

 

           (unknown)  (no       (unknown)  (unknown)  Personal history  (units  

  (unknown)



                    date)                         of genital unknown)  



                                                  herpes: No           

 

           (unknown)  (no       (unknown)  (unknown)  Plantar   (units    (unkno

wn)



                    date)                         fasciitis unknown)  

 

           (unknown)  (no       (unknown)  (unknown)  Position Sitting  (units  

  (unknown)



                    date)                                   unknown)  

 

           (unknown)  (no       (unknown)  (unknown)  Postpartum  (units    (unk

nown)



                    date)                         family    unknown)  



                                                  planning/Tubal           



                                                  sterilization:           



                                                  further             



                                                  discussion needed           

 

           (unknown)  (no       (unknown)  (unknown)  Pregnancy  (units    (unkn

own)



                    date)                         History   unknown)  

 

           (unknown)  (no       (unknown)  (unknown)  Pregnancy type::  (units  

  (unknown)



                    date)                         Other Normal unknown)  



                                                  Pregnancy           

 

           (unknown)  (no       (unknown)  (unknown)  Prenatal  (units    (unkno

wn)



                    date)                         Education unknown)  

 

           (unknown)  (no       (unknown)  (unknown)  Prenatal Initial  (units  

  (unknown)



                    date)                         Assessment unknown)  

 

           (unknown)  (no       (unknown)  (unknown)  Prenatal  (units    (unkno

wn)



                    date)                         Specific  unknown)  



                                                  Issues/Plans           

 

           (unknown)  (no       (unknown)  (unknown)  Prenatal  (units    (unkno

wn)



                    date)                         Testing:  unknown)  



                                                  discussed           

 

           (unknown)  (no       (unknown)  (unknown)  Prenatal Visit  (units    

(unknown)



                    date)                                   unknown)  

 

           (unknown)  (no       (unknown)  (unknown)  Prenatal  (units    (unkno

wn)



                    date)                         education packet: unknown)  



                                                  Child birth           



                                                  education/plan,           



                                                  Pregnancy           



                                                  symptoms,           

 

           (unknown)  (no       (unknown)  (unknown)  Primary Care  (units    (u

nknown)



                    date)                         Provider: BASIM Escobar unknown)  



                                                  [place]              

 

           (unknown)  (no       (unknown)  (unknown)  Primary Ob  (units    (unk

nown)



                    date)                         Provider: unknown)  



                                                  Julieta Au           

 

           (unknown)  (no       (unknown)  (unknown)  Prior     (units    (unkno

wn)



                    date)                         GBS-Infected unknown)  



                                                  child: No           

 

           (unknown)  (no       (unknown)  (unknown)  Providers  (units    (unkn

own)



                    date)                                   unknown)  

 

           (unknown)  (no       (unknown)  (unknown)  Pt presents for  (units   

 (unknown)



                    date)                         a PNV at 16+6 unknown)  



                                                  wks. No FM. No           



                                                  LOF/VB. Rec'd flu           



                                                  shot                

 

           (unknown)  (no       (unknown)  (unknown)  Rash or viral  (units    (

unknown)



                    date)                         illness since unknown)  



                                                  last menstrual           



                                                  period: No           

 

           (unknown)  (no       (unknown)  (unknown)  Rash      (units    (unkno

wn)



                    date)                                   unknown)  

 

           (unknown)  (no       (unknown)  (unknown)  Reason For Visit  (units  

  (unknown)



                    date)                                   unknown)  

 

           (unknown)  (no       (unknown)  (unknown)  Recent travel  (units    (

unknown)



                    date)                         outside of unknown)  



                                                  country?: No           

 

           (unknown)  (no       (unknown)  (unknown)  Recurrent  (units    (unkn

own)



                    date)                         pregnancy loss or unknown)  



                                                  a stillbirth: Yes           

 

           (unknown)  (no       (unknown)  (unknown)  Reports over the  (units  

  (unknown)



                    date)                         counter   unknown)  



                                                  medications           



                                                  (diclofenac),           



                                                  Denies alcohol,           



                                                  Denies              

 

           (unknown)  (no       (unknown)  (unknown)  Safety    (units    (unkno

wn)



                    date)                                   unknown)  

 

           (unknown)  (no       (unknown)  (unknown)  Second Trimester  (units  

  (unknown)



                    date)                         Education unknown)  



                                                  Checklist           

 

           (unknown)  (no       (unknown)  (unknown)  Selecting a  (units    (un

known)



                    date)                          care unknown)  



                                                  provider: further           



                                                  discussion needed           

 

           (unknown)  (no       (unknown)  (unknown)  She is at 22  (units    (u

nknown)



                    date)                         weeks 6 days. She unknown)  



                                                  has felt good           



                                                  fetal movement.           



                                                  She says it is           

 

           (unknown)  (no       (unknown)  (unknown)  Shingles  (units    (unkno

wn)



                    date)                                   unknown)  

 

           (unknown)  (no       (unknown)  (unknown)  Signed By:  (units    (unk

nown)



                    date)                                   unknown)  

 

           (unknown)  (no       (unknown)  (unknown)  Signs and  (units    (unkn

own)



                    date)                         symptoms of unknown)  



                                                   labor:           



                                                  discussed           

 

           (unknown)  (no       (unknown)  (unknown)  Smoking Status:  (units   

 (unknown)



                    date)                         Never smoker unknown)  

 

           (unknown)  (no       (unknown)  (unknown)  Social History  (units    

(unknown)



                    date)                                   unknown)  

 

           (unknown)  (no       (unknown)  (unknown)  Support   (units    (unkno

wn)



                    date)                         Person(s):: Franklin unknown)  

 

           (unknown)  (no       (unknown)  (unknown)  Surgical History  (units  

  (unknown)



                    date)                         (Updated 09/10/22 unknown)  



                                                  @ 21:46 by Fatoumata Flores)             

 

           (unknown)  (no       (unknown)  (unknown)  Surrogate  (units    (unkn

own)



                    date)                         pregnancy?: no unknown)  

 

           (unknown)  (no       (unknown)  (unknown)  Symptoms since  (units    

(unknown)



                    date)                         LMP: Reports unknown)  



                                                  amenorrhea,           



                                                  nausea, fatigue,           



                                                  breast              



                                                  tenderness,           

 

           (unknown)  (no       (unknown)  (unknown)  Tdap Adult  (units    (unk

nown)



                    date)                         (Adacel) Today unknown)  



                                                  Z23 - Encounter           



                                                  for immunization           

 

           (unknown)  (no       (unknown)  (unknown)  Teratogen  (units    (unkn

own)



                    date)                         Exposures since unknown)  



                                                  LMP/Conception:           



                                                  Denies              



                                                  prescription           



                                                  medications,           

 

           (unknown)  (no       (unknown)  (unknown)  Testing   (units    (unkno

wn)



                    date)                         Education unknown)  

 

           (unknown)  (no       (unknown)  (unknown)  Testing   (units    (unkno

wn)



                    date)                         education unknown)  



                                                  completed: group           



                                                  B strep, Spina           



                                                  bifida testing           



                                                  and Cell Free           

 

           (unknown)  (no       (unknown)  (unknown)  This note may  (units    (

unknown)



                    date)                         have been all or unknown)  



                                                  partially           



                                                  generated using           



                                                  voice recognition           

 

           (unknown)  (no       (unknown)  (unknown)  Tobacco +  (units    (unkn

own)



                    date)                         Substance Use unknown)  

 

           (unknown)  (no       (unknown)  (unknown)  Tobacco Status  (units    

(unknown)



                    date)                                   unknown)  

 

           (unknown)  (no       (unknown)  (unknown)  Trimester:: 3rd  (units   

 (unknown)



                    date)                         Trimester unknown)  



                                                  (28wks-Del)           

 

           (unknown)  (no       (unknown)  (unknown)  Tumor (-10/2012)  (units  

  (unknown)



                    date)                                   unknown)  

 

           (unknown)  (no       (unknown)  (unknown)  Type(s) of  (units    (unk

nown)



                    date)                         exercise: unknown)  



                                                  bicycling           



                                                  (stationary           



                                                  bike), regular           



                                                  exercise, weight           

 

           (unknown)  (no       (unknown)  (unknown) UProtein Movement  (units  

  (unknown)



                    date)                         PreLabor FHR Fndl unknown)  



                                                  Ht Pres Edema           



                                                  Cerv Exam           



                                                  US/Comment Next           



                                                  Appt                

 

           (unknown)  (no       (unknown)  (unknown)  Ultrasound  (units    (unk

nown)



                    date)                         performed?: Yes unknown)  

 

           (unknown)  (no       (unknown)  (unknown)  VIS Given Date  (units    

(unknown)



                    date)                         VIS Provided VIS unknown)  



                                                  Publication Date           

 

           (unknown)  (no       (unknown)  (unknown)  Varicella/chicke  (units  

  (unknown)



                    date)                         n pox status: unknown)  



                                                  previous disease           

 

           (unknown)  (no       (unknown)  (unknown)  Visit Date:  (units    (un

known)



                    date)                         23 Last unknown)  



                                                  Updated by:           



                                                  Julieta Au MD                  

 

           (unknown)  (no       (unknown)  (unknown)  Visit Date:  (units    (un

known)



                    date)                         22 Last unknown)  



                                                  Updated by:           



                                                  Julieta Au MD                  

 

           (unknown)  (no       (unknown)  (unknown)  Visit Date:  (units    (un

known)



                    date)                         10/11/22 Last unknown)  



                                                  Updated by:           



                                                  Fanny Baker MD                  

 

           (unknown)  (no       (unknown)  (unknown)  Visit Date:  (units    (un

known)



                    date)                         22 Last unknown)  



                                                  Updated by:           



                                                  Julieta Au MD                  

 

           (unknown)  (no       (unknown)  (unknown)  Visit Date:  (units    (un

known)



                    date)                         22 Last unknown)  



                                                  Updated by: Berta Salinas P.A-C           

 

           (unknown)  (no       (unknown)  (unknown)  Visit Reasons:  (units    

(unknown)



                    date)                         OB w 3D US unknown)  

 

           (unknown)  (no       (unknown)  (unknown)  Vitals    (units    (unkno

wn)



                    date)                                   unknown)  

 

           (unknown)  (no       (unknown)  (unknown)  Vitamins and  (units    (u

nknown)



                    date)                         iron, Diet and unknown)  



                                                  weight gain, Fish           



                                                  and mercury           



                                                  intake, Caffeine           



                                                  use,                

 

           (unknown)  (no       (unknown)  (unknown)  WG        (units    (unkno

wn)



                    date)                                   unknown)  

 

           (unknown)  (no       (unknown)  (unknown)  Weeks     (units    (unkno

wn)



                    date)                         gestation:: 28 unknown)  

 

           (unknown)  (no       (unknown)  (unknown)  Weight 177 lb  (units    (

unknown)



                    date)                                   unknown)  

 

           (unknown)  (no       (unknown)  (unknown)  [place] teeth  (units    (u

nknown)



                    date)                         extracted unknown)  

 

           (unknown)  (no       (unknown)  (unknown)  Yes no 144 29  (units    (

unknown)



                    date)                         Vertex absent AGA unknown)  



                                                  29w5d               

 

           (unknown)  (no       (unknown)  (unknown)  Zika virus  (units    (unk

nown)



                    date)                         exposure: No unknown)  

 

           (unknown)  (no       (unknown)  (unknown)  accompanied by  (units    

(unknown)



                    date)                         her . She unknown)  



                                                  went through           



                                                  fertility           



                                                  treatments with           

 

           (unknown)  (no       (unknown)  (unknown)  alcohol intake:  (units   

 (unknown)



                    date)                         never     unknown)  

 

           (unknown)  (no       (unknown)  (unknown)  anterior  (units    (unkno

wn)



                    date)                         placenta. Routine unknown)  



                                                  precautions           



                                                  reviewed with the           



                                                  patient. Due to           



                                                  1st                 

 

           (unknown)  (no       (unknown)  (unknown)  anyone in either  (units  

  (unknown)



                    date)                         family with: unknown)  

 

           (unknown)  (no       (unknown)  (unknown)  baby's father  (units    (

unknown)



                    date)                         had a child with unknown)  



                                                  birth defects not           



                                                  listed above and           



                                                  Denies Other           

 

           (unknown)  (no       (unknown)  (unknown)  back pain.  (units    (unk

nown)



                    date)                         Advised   unknown)  



                                                  stretching, belly           



                                                  band, and PT if           



                                                  not improving.           



                                                  AFP was             

 

           (unknown)  (no       (unknown)  (unknown)  blood sugar  (units    (un

known)



                    date)                         diagnostic (Blood unknown)  



                                                  Glucose Test           



                                                  strips) #100 ea           



                                                  23 [Rx           

 

           (unknown)  (no       (unknown)  (unknown)  blood-glucose  (units    (

unknown)



                    date)                         meter #1 ea unknown)  



                                                  23 [Rx           



                                                  Confirmed           



                                                  23]           

 

           (unknown)  (no       (unknown)  (unknown)  by ultrasound is  (units  

  (unknown)



                    date)                         normal with good unknown)  



                                                  fetal movement,           



                                                  normal appearing           



                                                  fluid,              

 

           (unknown)  (no       (unknown)  (unknown)  caffeine: Yes  (units    (

unknown)



                    date)                         (1-2 cups unknown)  



                                                  tea/day)            

 

           (unknown)  (no       (unknown)  (unknown)  carbon monox  (units    (u

nknown)



                    date)                         detector in home: unknown)  



                                                  Yes                 

 

           (unknown)  (no       (unknown)  (unknown)  cfDNA normal  (units    (u

nknown)



                    date)                         female, AFP unknown)  



                                                  negative            

 

           (unknown)  (no       (unknown)  (unknown)  consistent with  (units   

 (unknown)



                    date)                         9 weeks 0 days. unknown)  



                                                  Yolk sac visible.           



                                                  Fetal heart rate           



                                                  178 beats           

 

           (unknown)  (no       (unknown)  (unknown)  current   (units    (unkno

wn)



                    date)                         occupational unknown)  



                                                  exposures/hazards           



                                                  : No                

 

           (unknown)  (no       (unknown)  (unknown)  daily servings  (units    

(unknown)



                    date)                         fruits/ve-4 unknown)  

 

           (unknown)  (no       (unknown)  (unknown)  described, visit  (units  

  (unknown)



                    date)                         schedule  unknown)  



                                                  reviewed,           



                                                  ultrasounds           



                                                  policy reviewed,           



                                                  coverage 24           

 

           (unknown)  (no       (unknown)  (unknown)  developing a  (units    (u

nknown)



                    date)                         pattern. No unknown)  



                                                  leakage of fluid           



                                                  or vaginal           



                                                  bleeding. No           



                                                  contractions           

 

           (unknown)  (no       (unknown)  (unknown)  discussed,  (units    (unk

nown)



                    date)                         tuberculosis unknown)  



                                                  exposure            



                                                  discussed, CMV           



                                                  discussed,           



                                                  Toxoplasmosis           

 

           (unknown)  (no       (unknown)  (unknown)  disorders,  (units    (unk

nown)



                    date)                         Denies Cystic unknown)  



                                                  Fibrosis, Denies           



                                                  Mental              



                                                  Retardation/Autis           



                                                  m, Denies           

 

           (unknown)  (no       (unknown)  (unknown)  do you feel safe  (units  

  (unknown)



                    date)                         at home: Yes unknown)  

 

           (unknown)  (no       (unknown)  (unknown)  during the past  (units   

 (unknown)



                    date)                         year weight has: unknown)  



                                                  remained stable           

 

           (unknown)  (no       (unknown)  (unknown)  education level:  (units  

  (unknown)



                    date)                         college   unknown)  



                                                  (Associate's           



                                                  degree)             

 

           (unknown)  (no       (unknown)  (unknown)  exposure,  (units    (unkn

own)



                    date)                         Medication use, unknown)  



                                                  Sauna/hot tub           



                                                  use, Dental care,           



                                                  Travel and           



                                                  Influenza           

 

           (unknown)  (no       (unknown)  (unknown)  fire      (units    (unkno

wn)



                    date)                         extinguisher in unknown)  



                                                  home: Yes           

 

           (unknown)  (no       (unknown)  (unknown)  firearms in  (units    (un

known)



                    date)                         home: Yes unknown)  



                                                  firearms unloaded           



                                                  and locked: Yes           

 

           (unknown)  (no       (unknown)  (unknown)  frequency: 5-6  (units    

(unknown)



                    date)                         times per week unknown)  

 

           (unknown)  (no       (unknown)  (unknown)  gestational sac  (units   

 (unknown)



                    date)                         with a fetus with unknown)  



                                                  a crown-rump           



                                                  length measuring           



                                                  2.29 cm             

 

           (unknown)  (no       (unknown)  (unknown)  have occurred.  (units    

(unknown)



                    date)                         If there are any unknown)  



                                                  questions, please           



                                                  contact the           



                                                  Medical Records           

 

           (unknown)  (no       (unknown)  (unknown)  hours a day and  (units   

 (unknown)



                    date)                         participation of unknown)  



                                                  father in           



                                                  prenatal care and           



                                                  office visits           

 

           (unknown)  (no       (unknown)  (unknown)  household  (units    (unkn

own)



                    date)                         members: spouse unknown)  



                                                  and family           



                                                  (father and           



                                                  father-in-law)           

 

           (unknown)  (no       (unknown)  (unknown)  housing: house  (units    

(unknown)



                    date)                                   unknown)  

 

           (unknown)  (no       (unknown)  (unknown)  illicit drugs  (units    (

unknown)



                    date)                         and Denies other unknown)  

 

           (unknown)  (no       (unknown)  (unknown)  kag       (units    (unkno

wn)



                    date)                                   unknown)  

 

           (unknown)  (no       (unknown)  (unknown)  lancets #100 ea  (units   

 (unknown)



                    date)                         23 [Rx unknown)  



                                                  Confirmed           



                                                  23]           

 

           (unknown)  (no       (unknown)  (unknown)  last visit.  (units    (un

known)



                    date)                         Plan: AFP today. unknown)  



                                                  20 wk u/s           



                                                  reviewed. F/U 4           



                                                  wks. Warning           



                                                  signs               

 

           (unknown)  (no       (unknown)  (unknown)  lifting and  (units    (un

known)



                    date)                         other (hiking) unknown)  

 

           (unknown)  (no       (unknown)  (unknown)  lives     (units    (unkno

wn)



                    date)                         independently: unknown)  



                                                  Yes                 

 

           (unknown)  (no       (unknown)  (unknown)  marital status:  (units   

 (unknown)



                    date)                            unknown)  

 

           (unknown)  (no       (unknown)  (unknown)  may occur.  (units    (unk

nown)



                    date)                         Occasional unknown)  



                                                  wrong-word or           



                                                  'sound-alike'           



                                                  substitutions may           



                                                  have                

 

           (unknown)  (no       (unknown)  (unknown)  medication only.  (units  

  (unknown)



                    date)                         Had 7     unknown)  



                                                  miscarriages. No           



                                                  bleeding with           



                                                  this pregnancy.           



                                                  This 1              

 

           (unknown)  (no       (unknown)  (unknown)  metformin 500 mg  (units  

  (unknown)



                    date)                         PO BID 60 tabs unknown)  



                                                  0RF                 

 

           (unknown)  (no       (unknown)  (unknown)  metformin 500 mg  (units  

  (unknown)



                    date)                         tablet 500 mg PO unknown)  



                                                  BID #60 tabs           



                                                  23 [Rx           



                                                  Confirmed           



                                                  23]           

 

           (unknown)  (no       (unknown)  (unknown)  movement and  (units    (u

nknown)



                    date)                         denies VB, LOF, unknown)  



                                                  cramping and           



                                                  regular             



                                                  contractions. Pt           



                                                  reports low           

 

           (unknown)  (no       (unknown)  (unknown)  negative.  (units    (unkn

own)



                    date)                         Anatomy US unknown)  



                                                  scheduled for           



                                                  later today.           



                                                  Warning             



                                                  precautions           



                                                  reviewed.           

 

           (unknown)  (no       (unknown)  (unknown)  nickel Allergy  (units    

(unknown)



                    date)                         (Mild, Verified unknown)  



                                                  23 13:15)           

 

           (unknown)  (no       (unknown)  (unknown)  number of  (units    (unkn

own)



                    date)                         children: 0 unknown)  

 

           (unknown)  (no       (unknown)  (unknown)  occupational  (units    (u

nknown)



                    date)                         status: employed unknown)  



                                                  (active duty           



                                                  avionics            



                                                  ,           



                                                  [employer] job            

 

           (unknown)  (no       (unknown)  (unknown)  occurred due to  (units   

 (unknown)



                    date)                         the inherent unknown)  



                                                  limitations of           



                                                  voice recognition           



                                                  software. Please           

 

           (unknown)  (no       (unknown)  (unknown)  or cramping.  (units    (u

nknown)



                    date)                         Plan: 1 hour unknown)  



                                                  glucose ordered.           



                                                  Follow-up in 4           



                                                  weeks. Warning           

 

           (unknown)  (no       (unknown)  (unknown)  per minute.  (units    (un

known)



                    date)                         Normal ovaries unknown)  



                                                  bilaterally.           



                                                  Plan: Follow-up           



                                                  in 4 weeks.           



                                                  Warning             

 

           (unknown)  (no       (unknown)  (unknown)  pets and  (units    (unkno

wn)



                    date)                         animals: Yes (1 unknown)  



                                                  large dog,           



                                                  chickens)           

 

           (unknown)  (no       (unknown)  (unknown)  precautions,  (units    (u

nknown)



                    date)                         Listeriosis unknown)  



                                                  prevention and           



                                                  Rubella             



                                                  Immunization           

 

           (unknown)  (no       (unknown)  (unknown)  preeclampsia.  (units    (

unknown)



                    date)                                   unknown)  

 

           (unknown)  (no       (unknown)  (unknown)  pregnancy  (units    (unkn

own)



                    date)                         discussed unknown)  



                                                  starting baby           



                                                  aspirin per day           



                                                  to decrease her           



                                                  risk for            

 

           (unknown)  (no       (unknown)  (unknown)  prenat.vits,nan,  (units  

  (unknown)



                    date)                         min-iron-folic 1 unknown)  



                                                  tab PO DAILY           



                                                  22 [History           



                                                  Confirmed           

 

           (unknown)  (no       (unknown)  (unknown)  read the note  (units    (

unknown)



                    date)                         carefully and unknown)  



                                                  recognize, using           



                                                  context, where           



                                                  these               



                                                  substitutions           

 

           (unknown)  (no       (unknown)  (unknown)  reviewed.  (units    (unkn

own)



                    date)                                   unknown)  

 

           (unknown)  (no       (unknown)  (unknown)  seatbelt use:  (units    (

unknown)



                    date)                         always    unknown)  

 

           (unknown)  (no       (unknown)  (unknown)  second hand  (units    (un

known)



                    date)                         exposure: Yes unknown)  



                                                  (father-in-law           



                                                  smokes outside           



                                                  the house)           

 

           (unknown)  (no       (unknown)  (unknown)  signs reviewed.  (units   

 (unknown)



                    date)                         cfDNA ordered. unknown)  

 

           (unknown)  (no       (unknown)  (unknown)  signs reviewed.  (units   

 (unknown)



                    date)                                   unknown)  

 

           (unknown)  (no       (unknown)  (unknown)  software.  (units    (unkn

own)



                    date)                         Although every unknown)  



                                                  effort is made to           



                                                  edit content,           



                                                  transcription           



                                                  errors              

 

           (unknown)  (no       (unknown)  (unknown)  special madi  (units    (

unknown)



                    date)                         needs: No unknown)  

 

           (unknown)  (no       (unknown)  (unknown)  substance use  (units    (

unknown)



                    date)                         type: does not unknown)  



                                                  use                 

 

           (unknown)  (no       (unknown)  (unknown)  travel history:  (units   

 (unknown)



                    date)                         over 6 months ago unknown)  

 

           (unknown)  (no       (unknown)  (unknown)  urinary   (units    (unkno

wn)



                    date)                         frequency and unknown)  



                                                  irritability           

 

           (unknown)  (no       (unknown)  (unknown)  vaccine (Annual  (units   

 (unknown)



                    date)                         flu, Phizer Covid unknown)  



                                                  x2)                 

 

           (unknown)  (no       (unknown)  (unknown)  w0d 4 wks  (units    (unkn

own)



                    date)                                   unknown)  

 

           (unknown)  (no       (unknown)  (unknown)  was not using  (units    (

unknown)



                    date)                         any infertility unknown)  



                                                  medications.           



                                                  Ultrasound: An           



                                                  intrauterine           

 

           (unknown)  (no       (unknown)  (unknown)  water heater  (units    (u

nknown)



                    date)                         temp set < 120 unknown)  



                                                  deg: Yes            

 

           (unknown)  (no       (unknown)  (unknown)  well-balanced  (units    (

unknown)



                    date)                         diet: daily or unknown)  



                                                  most days           

 

           (unknown)  (no       (unknown)  (unknown)  went away with  (units    

(unknown)



                    date)                         laying down. No unknown)  



                                                  vaginal bleeding.           



                                                  No fevers. Fetal           



                                                  heart tone           

 

           (unknown)  (no       (unknown)  (unknown)  while pregnant)  (units   

 (unknown)



                    date)                                   unknown)  

 

           (unknown)  (no       (unknown)  (unknown)  working smoke  (units    (

unknown)



                    date)                         detector in home: unknown)  



                                                  Yes                 









                                         Result panel 17









           (unknown)  (no       (unknown)  (unknown)  (no value)  (units    (unk

nown)



                    date)                                   unknown)  

 

           (unknown)  (no       (unknown)  (unknown)  (+12 lb) 120/58  (units   

 (unknown)



                    date)                         N         unknown)  

 

           (unknown)  (no       (unknown)  (unknown)  (+13 lb) 104/54  (units   

 (unknown)



                    date)                         N         unknown)  

 

           (unknown)  (no       (unknown)  (unknown)  (+18 lb) 122/60  (units   

 (unknown)



                    date)                         N         unknown)  

 

           (unknown)  (no       (unknown)  (unknown)  (+22 lb) 110/62  (units   

 (unknown)



                    date)                         N         unknown)  

 

           (unknown)  (no       (unknown)  (unknown)  (+7 lb) 128/66 N  (units  

  (unknown)



                    date)                                   unknown)  

 

           (unknown)  (no       (unknown)  (unknown)  (+8 lb) 122/68 N  (units  

  (unknown)



                    date)                                   unknown)  

 

           (unknown)  (no       (unknown)  (unknown)  (1) Gestational  (units   

 (unknown)



                    date)                         diabetes: unknown)  

 

           (unknown)  (no       (unknown)  (unknown)  (2) 28 weeks  (units    (u

nknown)



                    date)                         gestation of unknown)  



                                                  pregnancy:           

 

           (unknown)  (no       (unknown)  (unknown)  **Genetic  (units    (unkn

own)



                    date)                         Screening/Teratol unknown)  



                                                  ogy Counseling -           



                                                  Includes patient,           



                                                  baby's father, or           

 

           (unknown)  (no       (unknown)  (unknown)  -?-?-?-?-?-?-?-?  (units  

  (unknown)



                    date)                         -?-?-?-?  unknown)  

 

           (unknown)  (no       (unknown)  (unknown)  0.5 mL IM Right  (units   

 (unknown)



                    date)                         Deltoid [id number] unknown)  



                                                  24            



                                                  [id number]           



                                                  SANOFI-PASTEUR           

 

           (unknown)  (no       (unknown)  (unknown)  23  (units    (unkno

wn)



                    date)                                   unknown)  

 

           (unknown)  (no       (unknown)  (unknown)  23 Single  (units   

 (unknown)



                    date)                         Vaccine 21 unknown)  

 

           (unknown)  (no       (unknown)  (unknown)  23  (units    (unkno

wn)



                    date)                                   unknown)  

 

           (unknown)  (no       (unknown)  (unknown)  23]  (units    (unkn

own)



                    date)                                   unknown)  

 

           (unknown)  (no       (unknown)  (unknown)  23 1619  (units    (

unknown)



                    date)                                   unknown)  

 

           (unknown)  (no       (unknown)  (unknown)  04/15/23  (units    (unkno

wn)



                    date)                         Ultrasound #1 31w unknown)  



                                                  1d                  

 

           (unknown)  (no       (unknown)  (unknown)  [id number]   (units    (unkno

wn)



                    date)                                   unknown)  

 

           (unknown)  (no       (unknown)  (unknown)  22  (units    (unkno

wn)



                    date)                                   unknown)  

 

           (unknown)  (no       (unknown)  (unknown)  10/11/22  (units    (unkno

wn)



                    date)                                   unknown)  

 

           (unknown)  (no       (unknown)  (unknown)  22  (units    (unkno

wn)



                    date)                                   unknown)  

 

           (unknown)  (no       (unknown)  (unknown)  22  (units    (unkno

wn)



                    date)                                   unknown)  

 

           (unknown)  (no       (unknown)  (unknown)  12w 6d 163 lb  (units    (

unknown)



                    date)                                   unknown)  

 

           (unknown)  (no       (unknown)  (unknown)  13:15     (units    (unkno

wn)



                    date)                                   unknown)  

 

           (unknown)  (no       (unknown)  (unknown)  16w 6d 167 lb  (units    (

unknown)



                    date)                                   unknown)  

 

           (unknown)  (no       (unknown)  (unknown)  20w 5d 168 lb  (units    (

unknown)



                    date)                                   unknown)  

 

           (unknown)  (no       (unknown)  (unknown)  24w 6d 173 lb  (units    (

unknown)



                    date)                                   unknown)  

 

           (unknown)  (no       (unknown)  (unknown)  28w 6d 177 lb  (units    (

unknown)



                    date)                                   unknown)  

 

           (unknown)  (no       (unknown)  (unknown)  3 wks     (units    (unkno

wn)



                    date)                                   unknown)  

 

           (unknown)  (no       (unknown)  (unknown)  4 wk      (units    (unkno

wn)



                    date)                                   unknown)  

 

           (unknown)  (no       (unknown)  (unknown)  8w 6d 162 lb  (units    (u

nknown)



                    date)                                   unknown)  

 

           (unknown)  (no       (unknown)  (unknown)  Abnormal lab  (units    (u

nknown)



                    date)                         values 1st unknown)  



                                                  trimester:           



                                                  discussed           

 

           (unknown)  (no       (unknown)  (unknown)  Abnormal lab  (units    (u

nknown)



                    date)                         values 2nd unknown)  



                                                  trimester:           



                                                  discussed           

 

           (unknown)  (no       (unknown)  (unknown)  Adacel(Tdap  (units    (un

known)



                    date)                         Adolesn/Adult)(PF unknown)  



                                                  )                   

 

           (unknown)  (no       (unknown)  (unknown)  Add'l Plan  (units    (unk

nown)



                    date)                         Details   unknown)  

 

           (unknown)  (no       (unknown)  (unknown)  Administered by:  (units  

  (unknown)



                    date)                         Edelmira Montero, unknown)  



                                                  MA on 23           



                                                  13:25               

 

           (unknown)  (no       (unknown)  (unknown)  Age/Sex: 27 / F  (units   

 (unknown)



                    date)                         Date of Service: unknown)  

 

           (unknown)  (no       (unknown)  (unknown)  Allergies  (units    (unkn

own)



                    date)                         (-)   unknown)  

 

           (unknown)  (no       (unknown)  (unknown)  Allergies  (units    (unkn

own)



                    date)                                   unknown)  

 

           (unknown)  (no       (unknown)  (unknown)  SARINA Barker  (units    (

unknown)



                    date)                         [id number]     unknown)  

 

           (unknown)  (no       (unknown)  (unknown)  Anesthesia  (units    (unk

nown)



                    date)                                   unknown)  

 

           (unknown)  (no       (unknown)  (unknown)  Aneuploidy  (units    (unk

nown)



                    date)                         Screening unknown)  



                                                  Offered: Accepted           



                                                  (wants CFDNA)           

 

           (unknown)  (no       (unknown)  (unknown)  Anticipated  (units    (un

known)



                    date)                         course of unknown)  



                                                  prenatal care:           



                                                  discussed           

 

           (unknown)  (no       (unknown)  (unknown)  Anxiety ()  (units   

 (unknown)



                    date)                                   unknown)  

 

           (unknown)  (no       (unknown)  (unknown)  Arrhythmia  (units    (unk

nown)



                    date)                                   unknown)  

 

           (unknown)  (no       (unknown)  (unknown)  Assessment and  (units    

(unknown)



                    date)                         Plan      unknown)  

 

           (unknown)  (no       (unknown)  (unknown)  Attending Dr:  (units    (

unknown)



                    date)                         Julieta Au unknown)  



                                                  MD                  

 

           (unknown)  (no       (unknown)  (unknown)  Libra presents  (units   

 (unknown)



                    date)                         today for routine unknown)  



                                                  OB f/u at 20w5d.           



                                                  She reports good           



                                                  fetal               

 

           (unknown)  (no       (unknown)  (unknown)  BMI 28.5  (units    (unkno

wn)



                    date)                                   unknown)  

 

           (unknown)  (no       (unknown)  (unknown)  /62  (units    (unkn

own)



                    date)                                   unknown)  

 

           (unknown)  (no       (unknown)  (unknown)  Birth     (units    (unkno

wn)



                    date)                         Plan/Preferences unknown)  

 

           (unknown)  (no       (unknown)  (unknown)  Birth Planning  (units    

(unknown)



                    date)                                   unknown)  

 

           (unknown)  (no       (unknown)  (unknown)  Blood Pressure  (units    

(unknown)



                    date)                         Location Lt unknown)  



                                                  brachial            

 

           (unknown)  (no       (unknown)  (unknown)  Blood     (units    (unkno

wn)



                    date)                         transfusions?: unknown)  



                                                  yes (Never had           



                                                  but would accept)           

 

           (unknown)  (no       (unknown)  (unknown)  Breastfeeding:  (units    

(unknown)



                    date)                         discussed unknown)  

 

           (unknown)  (no       (unknown)  (unknown)  Chicken pox  (units    (un

known)



                    date)                         (-)   unknown)  

 

           (unknown)  (no       (unknown)  (unknown)  Childbirth  (units    (unk

nown)



                    date)                         Classes:  unknown)  



                                                  discussed           

 

           (unknown)  (no       (unknown)  (unknown)  Conceived  (units    (unkn

own)



                    date)                         naturally this unknown)  



                                                  pregnancy           

 

           (unknown)  (no       (unknown)  (unknown)  Confirmed  (units    (unkn

own)



                    date)                         23] unknown)  

 

           (unknown)  (no       (unknown)  (unknown)  Current Estimate  (units  

  (unknown)



                    date)                         23  unknown)  



                                                  Ultrasound #2 30w           



                                                  4d                  

 

           (unknown)  (no       (unknown)  (unknown)  Current   (units    (unkno

wn)



                    date)                         Pregnancy History unknown)  

 

           (unknown)  (no       (unknown)  (unknown)  DNA       (units    (unkno

wn)



                    date)                                   unknown)  

 

           (unknown)  (no       (unknown)  (unknown)  : 1995  (units   

 (unknown)



                    date)                         Acct:[id number] unknown)  

 

           (unknown)  (no       (unknown)  (unknown)  Date of positive  (units  

  (unknown)



                    date)                         home pregnancy unknown)  



                                                  test: 08/15/22           

 

           (unknown)  (no       (unknown)  (unknown)  Date      (units    (unkno

wn)



                    date)                                   unknown)  

 

           (unknown)  (no       (unknown)  (unknown) Denies Congenital  (units  

  (unknown)



                    date)                         Heart Defect, unknown)  



                                                  Denies Down           



                                                  Syndrome, Denies           



                                                  Muscular            



                                                  Dystrophy,           

 

           (unknown)  (no       (unknown)  (unknown)  Denies Maternal  (units   

 (unknown)



                    date)                         Metabolic unknown)  



                                                  Disorder (EG,TYPE           



                                                  1 Diabetes, PKU),           



                                                  Denies Patient or           

 

           (unknown)  (no       (unknown)  (unknown)  Denies Neural  (units    (

unknown)



                    date)                         Tube Defect unknown)  



                                                  (Meningomyelocele           



                                                  , Spina Bifida,           



                                                  or Anencephaly),           

 

           (unknown)  (no       (unknown)  (unknown)  Denies Sickle  (units    (

unknown)



                    date)                         Cell Disease or unknown)  



                                                  Trait (),           



                                                  Denies Hemophilia           



                                                  or other blood           

 

           (unknown)  (no       (unknown)  (unknown)  Denies [place]  (units  

  (unknown)



                    date)                         (Ashkenazi unknown)  



                                                  [employer], [place],           



                                                  [place]),           



                                                  Denies [place]           

 

           (unknown)  (no       (unknown)  (unknown)  Depression  (units    (unk

nown)



                    date)                         ()   unknown)  

 

           (unknown)  (no       (unknown)  (unknown)  Depression:  (units    (un

known)



                    date)                         discussed unknown)  

 

           (unknown)  (no       (unknown)  (unknown)  Dept at   (units    (unkno

wn)



                    date)                         [phone]. unknown)  

 

           (unknown)  (no       (unknown)  (unknown)  Diet and  (units    (unkno

wn)



                    date)                         Exercise  unknown)  

 

           (unknown)  (no       (unknown)  (unknown)  Disease   (units    (unkno

wn)



                    date)                         (Ashkenazi unknown)  



                                                  Advent), Denies           



                                                  Familial            



                                                  Dysautonomia           



                                                  (Ashkenazi           



                                                  Advent),            

 

           (unknown)  (no       (unknown)  (unknown)  Documented By:  (units    

(unknown)



                    date)                         Julieta Au unknown)  



                                                  MD 23 1314           

 

           (unknown)  (no       (unknown)  (unknown)  Dose Route Admin  (units  

  (unknown)



                    date)                         Location Lot unknown)  



                                                  Number Expiration           



                                                  Date NDC            

 

           (unknown)  (no       (unknown)  (unknown)  MEHNAZ Calculator  (units    

(unknown)



                    date)                                   unknown)  

 

           (unknown)  (no       (unknown)  (unknown)  EGA Weight BP  (units    (

unknown)



                    date)                         UGlucose  unknown)  

 

           (unknown)  (no       (unknown)  (unknown)  Eczema (-2000)  (units    

(unknown)



                    date)                                   unknown)  

 

           (unknown)  (no       (unknown)  (unknown)  Eligibility  (units    (un

known)



                    date)                         Eligibility Date unknown)  



                                                  Funding Source           

 

           (unknown)  (no       (unknown)  (unknown)  Estimated  (units    (unkn

own)



                    date)                         Delivery Date unknown)  



                                                  Method Current           

 

           (unknown)  (no       (unknown)  (unknown)  Exercise and  (units    (u

nknown)



                    date)                         activity, unknown)  



                                                  work/environmenta           



                                                  l/hazards, Sexual           



                                                  activity, X-ray           

 

           (unknown)  (no       (unknown)  (unknown)  F/u in 4 wks.  (units    (

unknown)



                    date)                                   unknown)  

 

           (unknown)  (no       (unknown)  (unknown)  Family History  (units    

(unknown)



                    date)                         (Updated 09/10/22 unknown)  



                                                  @ 21:49 by Fatoumata Flores)             

 

           (unknown)  (no       (unknown)  (unknown)  Family/Other  (units    (u

nknown)



                    date)                         Multiple  unknown)  



                                                  sclerosis           

 

           (unknown)  (no       (unknown)  (unknown)  Father    (units    (unkno

wn)



                    date)                         Hypertension unknown)  

 

           (unknown)  (no       (unknown)  (unknown)  Father of Baby:  (units   

 (unknown)



                    date)                         same      unknown)  

 

           (unknown)  (no       (unknown)  (unknown)  [facility]  (units   

 (unknown)



                    date)                         [facility] unknown)  

 

           (unknown)  (no       (unknown)  (unknown)  First Trimester  (units   

 (unknown)



                    date)                         Education unknown)  



                                                  Checklist           

 

           (unknown)  (no       (unknown)  (unknown)  Foot pain  (units    (unkn

own)



                    date)                         (-)   unknown)  

 

           (unknown)  (no       (unknown)  (unknown)   (SAB  (units    (unkn

own)



                    date)                         x7),Fertility Tx, unknown)  



                                                  meds only,           



                                                  started on baby           



                                                  aspirin             



                                                  10/11/2022           

 

           (unknown)  (no       (unknown)  (unknown)  GDM, on   (units    (unkno

wn)



                    date)                         Metformin 500mg unknown)  



                                                  BID                 

 

           (unknown)  (no       (unknown)  (unknown)  Genetic   (units    (unkno

wn)



                    date)                         Screening + unknown)  



                                                  Counseling           

 

           (unknown)  (no       (unknown)  (unknown)  Genetic   (units    (unkno

wn)



                    date)                         Screening unknown)  

 

           (unknown)  (no       (unknown)  (unknown)  Gestational  (units    (un

known)



                    date)                         diabetes mellitus unknown)  



                                                  control: oral           



                                                  hypoglycemic-cont           



                                                  rolled              

 

           (unknown)  (no       (unknown)  (unknown)  Grandfather  (units    (un

known)



                    date)                          Cancer unknown)  

 

           (unknown)  (no       (unknown)  (unknown)  Grandfather  (units    (un

known)



                    date)                          Stroke unknown)  

 

           (unknown)  (no       (unknown)  (unknown)  Grandmother  (units    (un

known)



                    date)                          History unknown)  



                                                  of heart disease           

 

           (unknown)  (no       (unknown)  (unknown)  Grandmother  (units    (un

known)



                    date)                          Multiple unknown)  



                                                  sclerosis           

 

           (unknown)  (no       (unknown)  (unknown)   8  (units    (unkn

own)



                    date)                         Multiple births 0 unknown)  

 

           (unknown)  (no       (unknown)  (unknown)  HIV risk  (units    (unkno

wn)



                    date)                         evaluation: low unknown)  



                                                  risk                

 

           (unknown)  (no       (unknown)  (unknown)  [facility]  (units    (

unknown)



                    date)                         Education unknown)  

 

           (unknown)  (no       (unknown)  (unknown)  Health center  (units    (

unknown)



                    date)                         information: unknown)  



                                                  nature of           



                                                  practice            



                                                  discussed,           



                                                  prenatal            



                                                  personnel           

 

           (unknown)  (no       (unknown)  (unknown)  Height 5 ft 6 in  (units  

  (unknown)



                    date)                                   unknown)  

 

           (unknown)  (no       (unknown)  (unknown)  Hepatitis C risk  (units  

  (unknown)



                    date)                         evaluation: low unknown)  



                                                  risk                

 

           (unknown)  (no       (unknown)  (unknown)  History of  (units    (unk

nown)



                    date)                         Hepatitis B: No unknown)  

 

           (unknown)  (no       (unknown)  (unknown)  History of  (units    (unk

nown)



                    date)                         Hepatitis C: No unknown)  

 

           (unknown)  (no       (unknown)  (unknown)  History of  (units    (unk

nown)



                    date)                         recurrent unknown)  



                                                  miscarriages           

 

           (unknown)  (no       (unknown)  (unknown)  [facility]  (units    (u

nknown)



                    date)                                   unknown)  

 

           (unknown)  (no       (unknown)  (unknown)  [facility]  (units    (u

nknown)



                    date)                         Chorea, Denies unknown)  



                                                  Other inherited           



                                                  genetic or           



                                                  chromosomal           



                                                  disorder,           

 

           (unknown)  (no       (unknown)  (unknown)  , [place]  (units    (

unknown)



                    date)                         [place]  unknown)  

 

           (unknown)  (no       (unknown)  (unknown)  Hx #   (units    (u

nknown)



                    date)                         Pregnancies 0 unknown)  



                                                  Elective            



                                                  abortions 0           

 

           (unknown)  (no       (unknown)  (unknown)  Hx # Term  (units    (unkn

own)



                    date)                         Pregnancies 0 unknown)  



                                                  Ectopic             



                                                  pregnancies 0           

 

           (unknown)  (no       (unknown)  (unknown)  Immunizations  (units    (

unknown)



                    date)                                   unknown)  

 

           (unknown)  (no       (unknown)  (unknown)  Infant will be  (units    

(unknown)



                    date)                         adopted?: no unknown)  

 

           (unknown)  (no       (unknown)  (unknown)  Infection  (units    (unkn

own)



                    date)                         History   unknown)  

 

           (unknown)  (no       (unknown)  (unknown)  Infectious  (units    (unk

nown)



                    date)                         Disease Education unknown)  

 

           (unknown)  (no       (unknown)  (unknown)  Infectious  (units    (unk

nown)



                    date)                         disease exposure: unknown)  



                                                  chicken pox           



                                                  immunity            



                                                  discussed,           



                                                  hepatitis risk           

 

           (unknown)  (no       (unknown)  (unknown)  Infertility  (units    (un

known)



                    date)                         ()   unknown)  

 

           (unknown)  (no       (unknown)  (unknown)  Initial Weight:  (units   

 (unknown)



                    date)                         155 lb    unknown)  

 

           (unknown)  (no       (unknown)  (unknown)  Initials  (units    (unkno

wn)



                    date)                                   unknown)  

 

           (unknown)  (no       (unknown)  (unknown)  Intake Clinical  (units   

 (unknown)



                    date)                         Staff     unknown)  

 

           (unknown)  (no       (unknown)  (unknown)  Intake Note:  (units    (u

nknown)



                    date)                                   unknown)  

 

           (unknown)  (no       (unknown)  (unknown)  Intake performed  (units  

  (unknown)



                    date)                         by:       unknown)  



                                                  Edelmira Montero           

 

           (unknown)  (no       (unknown)  (unknown)  Intake    (units    (unkno

wn)



                    date)                                   unknown)  

 

           (unknown)  (no       (unknown)  (unknown)  Irregular  (units    (unkn

own)



                    date)                         menstrual cycle unknown)  



                                                  ()             

 

           (unknown)  (no       (unknown)  (unknown)  LM        (units    (unkno

wn)



                    date)                                   unknown)  

 

           (unknown)  (no       (unknown)  (unknown)  Lipoma    (units    (unkno

wn)



                    date)                                   unknown)  

 

           (unknown)  (no       (unknown)  (unknown)  Live with  (units    (unkn

own)



                    date)                         someone with TB unknown)  



                                                  or exposed to TB:           



                                                  No                  

 

           (unknown)  (no       (unknown)  (unknown)  Loc: [facility]  (units    (unkno

wn)



                    date)                                   unknown)  

 

           (unknown)  (no       (unknown)  (unknown)    (units    (u

nknown)



                    date)                                   unknown)  

 

           (unknown)  (no       (unknown)  (unknown)  Marital status:  (units   

 (unknown)



                    date)                            unknown)  

 

           (unknown)  (no       (unknown)  (unknown)  Medical History  (units   

 (unknown)



                    date)                         (Updated 23 unknown)  



                                                  @ 16:18 by           



                                                  Julieta Au MD)                 

 

           (unknown)  (no       (unknown)  (unknown)  Medications  (units    (un

known)



                    date)                                   unknown)  

 

           (unknown)  (no       (unknown)  (unknown)  Medications:  (units    (u

nknown)



                    date)                                   unknown)  

 

           (unknown)  (no       (unknown)  (unknown)  Mother Gastric  (units    

(unknown)



                    date)                         cancer    unknown)  

 

           (unknown)  (no       (unknown)  (unknown)  N No 142 13 N/A  (units   

 (unknown)



                    date)                         4wk       unknown)  

 

           (unknown)  (no       (unknown)  (unknown)  N No no 143 17  (units    

(unknown)



                    date)                         N/A absent AFP 4 unknown)  



                                                  wks                 

 

           (unknown)  (no       (unknown)  (unknown)  N No no 178 9  (units    (

unknown)



                    date)                         N/A absent unknown)  



                                                  long/closed AGA 9           

 

           (unknown)  (no       (unknown)  (unknown)  N Yes no 141 24  (units   

 (unknown)



                    date)                         N/A absent 4 wks unknown)  

 

           (unknown)  (no       (unknown)  (unknown)  N Yes no 142 20  (units   

 (unknown)



                    date)                         N/A absent AFP unknown)  



                                                  negative            

 

           (unknown)  (no       (unknown)  (unknown)  N Yes no 144 29  (units   

 (unknown)



                    date)                         Vertex absent AGA unknown)  



                                                  29w5d               

 

           (unknown)  (no       (unknown)  (unknown)  NF        (units    (unkno

wn)



                    date)                                   unknown)  

 

           (unknown)  (no       (unknown)  (unknown)  New       (units    (unkno

wn)



                    date)                                   unknown)  

 

           (unknown)  (no       (unknown)  (unknown)  Not VFC Eligible  (units  

  (unknown)



                    date)                         23 Private unknown)  



                                                  Funds               

 

           (unknown)  (no       (unknown)  (unknown)  Notes     (units    (unkno

wn)



                    date)                                   unknown)  

 

           (unknown)  (no       (unknown)  (unknown)  Number of Living  (units  

  (unknown)



                    date)                         Children 0 unknown)  

 

           (unknown)  (no       (unknown)  (unknown)  Number of  (units    (unkn

own)



                    date)                         fetuses:: Single unknown)  

 

           (unknown)  (no       (unknown)  (unknown)  Nutrition and  (units    (

unknown)



                    date)                         weight gain unknown)  



                                                  counseling:           



                                                  special diet:           



                                                  discussed           

 

           (unknown)  (no       (unknown)  (unknown)  OB Office Visit  (units   

 (unknown)



                    date)                                   unknown)  

 

           (unknown)  (no       (unknown)  (unknown)  OB Visit Log  (units    (u

nknown)



                    date)                                   unknown)  

 

           (unknown)  (no       (unknown)  (unknown)  OB check  (units    (unkno

wn)



                    date)                                   unknown)  

 

           (unknown)  (no       (unknown)  (unknown)  On U/S: AGA  (units    (un

known)



                    date)                         29w5d. 3#4oz unknown)  



                                                  66%ile. Nl AFV.           



                                                  Plan: Metformin           



                                                  500mg BID. F/U 3           

 

           (unknown)  (no       (unknown)  (unknown)  On birth control  (units  

  (unknown)



                    date)                         at conception?: unknown)  



                                                  No                  

 

           (unknown)  (no       (unknown)  (unknown)  Orders    (units    (unkno

wn)



                    date)                                   unknown)  

 

           (unknown)  (no       (unknown)  (unknown)  Orders:   (units    (unkno

wn)



                    date)                                   unknown)  

 

           (unknown)  (no       (unknown)  (unknown)  Other Estimates  (units   

 (unknown)



                    date)                         23 LMP unknown)  



                                                  (Certain) 32w 5d           

 

           (unknown)  (no       (unknown)  (unknown)  Ovarian cyst  (units    (u

nknown)



                    date)                         (-2018)   unknown)  

 

           (unknown)  (no       (unknown)  (unknown)  PCOS (polycystic  (units  

  (unknown)



                    date)                         ovarian syndrome) unknown)  

 

           (unknown)  (no       (unknown)  (unknown)  PFSH      (units    (unkno

wn)



                    date)                                   unknown)  

 

           (unknown)  (no       (unknown)  (unknown)  Pap performed?:  (units   

 (unknown)



                    date)                         No        unknown)  

 

           (unknown)  (no       (unknown)  (unknown)  Para 0    (units    (unkno

wn)



                    date)                         Spontaneous unknown)  



                                                  abortions 7           

 

           (unknown)  (no       (unknown)  (unknown)  Partner history  (units   

 (unknown)



                    date)                         of STD: denies hx unknown)  

 

           (unknown)  (no       (unknown)  (unknown)  Partner history  (units   

 (unknown)



                    date)                         of genital unknown)  



                                                  herpes: No           

 

           (unknown)  (no       (unknown)  (unknown)  Partner: [place]  (units    (

unknown)



                    date)                         [place]  unknown)  

 

           (unknown)  (no       (unknown)  (unknown)  Patient comes in  (units  

  (unknown)



                    date)                         for follow-up OB unknown)  



                                                  visit. She had           



                                                  some pelvic pain           



                                                  that                

 

           (unknown)  (no       (unknown)  (unknown)  Patient presents  (units  

  (unknown)



                    date)                         for a new OB unknown)  



                                                  visit at 8 weeks           



                                                  gestation. She is           

 

           (unknown)  (no       (unknown)  (unknown)  Patient presents  (units  

  (unknown)



                    date)                         for a routine unknown)  



                                                  prenatal visit,           



                                                  accompanied by           



                                                  her .           

 

           (unknown)  (no       (unknown)  (unknown)  Patient's age 33  (units  

  (unknown)



                    date)                         years or older as unknown)  



                                                  of estimated date           



                                                  of delivery: No           

 

           (unknown)  (no       (unknown)  (unknown)  Patient:  (units    (unkno

wn)



                    date)                         Libra Prieto unknown)  



                                                  MR#: [id number]            

 

           (unknown)  (no       (unknown)  (unknown)  Pediatrician:  (units    (

unknown)



                    date)                         DOD [place] vs unknown)  



                                                  [facility]           



                                                  [facility]             

 

           (unknown)  (no       (unknown)  (unknown)  Performing  (units    (unk

nown)



                    date)                         Provider: unknown)  



                                                  Julieta Au MD                  

 

           (unknown)  (no       (unknown)  (unknown)  Personal history  (units  

  (unknown)



                    date)                         of STD: denies hx unknown)  

 

           (unknown)  (no       (unknown)  (unknown)  Personal history  (units  

  (unknown)



                    date)                         of genital unknown)  



                                                  herpes: No           

 

           (unknown)  (no       (unknown)  (unknown)  Plantar   (units    (unkno

wn)



                    date)                         fasciitis unknown)  

 

           (unknown)  (no       (unknown)  (unknown)  Position Sitting  (units  

  (unknown)



                    date)                                   unknown)  

 

           (unknown)  (no       (unknown)  (unknown)  Postpartum  (units    (unk

nown)



                    date)                         family    unknown)  



                                                  planning/Tubal           



                                                  sterilization:           



                                                  further             



                                                  discussion needed           

 

           (unknown)  (no       (unknown)  (unknown)  Pregnancy  (units    (unkn

own)



                    date)                         History   unknown)  

 

           (unknown)  (no       (unknown)  (unknown)  Pregnancy type::  (units  

  (unknown)



                    date)                         Other Normal unknown)  



                                                  Pregnancy           

 

           (unknown)  (no       (unknown)  (unknown)  Prenatal  (units    (unkno

wn)



                    date)                         Education unknown)  

 

           (unknown)  (no       (unknown)  (unknown)  Prenatal Initial  (units  

  (unknown)



                    date)                         Assessment unknown)  

 

           (unknown)  (no       (unknown)  (unknown)  Prenatal  (units    (unkno

wn)



                    date)                         Specific  unknown)  



                                                  Issues/Plans           

 

           (unknown)  (no       (unknown)  (unknown)  Prenatal  (units    (unkno

wn)



                    date)                         Testing:  unknown)  



                                                  discussed           

 

           (unknown)  (no       (unknown)  (unknown)  Prenatal Visit  (units    

(unknown)



                    date)                                   unknown)  

 

           (unknown)  (no       (unknown)  (unknown)  Prenatal  (units    (unkno

wn)



                    date)                         education packet: unknown)  



                                                  Child birth           



                                                  education/plan,           



                                                  Pregnancy           



                                                  symptoms,           

 

           (unknown)  (no       (unknown)  (unknown)  Primary Care  (units    (u

nknown)



                    date)                         Provider: BASIM Escobar unknown)  



                                                  [facility]              

 

           (unknown)  (no       (unknown)  (unknown)  Primary Ob  (units    (unk

nown)



                    date)                         Provider: unknown)  



                                                  Julieta Au           

 

           (unknown)  (no       (unknown)  (unknown)  Prior     (units    (unkno

wn)



                    date)                         GBS-Infected unknown)  



                                                  child: No           

 

           (unknown)  (no       (unknown)  (unknown)  Providers  (units    (unkn

own)



                    date)                                   unknown)  

 

           (unknown)  (no       (unknown)  (unknown)  Pt presents for  (units   

 (unknown)



                    date)                         a PNV at 16+6 unknown)  



                                                  wks. No FM. No           



                                                  LOF/VB. Rec'd flu           



                                                  shot                

 

           (unknown)  (no       (unknown)  (unknown)  Pt presents for  (units   

 (unknown)



                    date)                         a routine PNV at unknown)  



                                                  28 +6 wks           



                                                  gestation.           



                                                  Abnormal 3 hr           



                                                  GTT.                

 

           (unknown)  (no       (unknown)  (unknown)  Qualifiers:  (units    (un

known)



                    date)                                   unknown)  

 

           (unknown)  (no       (unknown)  (unknown)  Rash or viral  (units    (

unknown)



                    date)                         illness since unknown)  



                                                  last menstrual           



                                                  period: No           

 

           (unknown)  (no       (unknown)  (unknown)  Rash      (units    (unkno

wn)



                    date)                                   unknown)  

 

           (unknown)  (no       (unknown)  (unknown)  Reason For Visit  (units  

  (unknown)



                    date)                                   unknown)  

 

           (unknown)  (no       (unknown)  (unknown)  Recent travel  (units    (

unknown)



                    date)                         outside of unknown)  



                                                  country?: No           

 

           (unknown)  (no       (unknown)  (unknown)  Recurrent  (units    (unkn

own)



                    date)                         pregnancy loss or unknown)  



                                                  a stillbirth: Yes           

 

           (unknown)  (no       (unknown)  (unknown)  Reports over the  (units  

  (unknown)



                    date)                         counter   unknown)  



                                                  medications           



                                                  (diclofenac),           



                                                  Denies alcohol,           



                                                  Denies              

 

           (unknown)  (no       (unknown)  (unknown)  Safety    (units    (unkno

wn)



                    date)                                   unknown)  

 

           (unknown)  (no       (unknown)  (unknown)  Second Trimester  (units  

  (unknown)



                    date)                         Education unknown)  



                                                  Checklist           

 

           (unknown)  (no       (unknown)  (unknown)  Selecting a  (units    (un

known)



                    date)                          care unknown)  



                                                  provider: further           



                                                  discussion needed           

 

           (unknown)  (no       (unknown)  (unknown)  She is at 22  (units    (u

nknown)



                    date)                         weeks 6 days. She unknown)  



                                                  has felt good           



                                                  fetal movement.           



                                                  She says it is           

 

           (unknown)  (no       (unknown)  (unknown)  Shingles  (units    (unkno

wn)



                    date)                                   unknown)  

 

           (unknown)  (no       (unknown)  (unknown)  Signed By:  (units    (unk

nown)



                    date)                         <Electronically unknown)  



                                                  signed by           



                                                  Julieta Au MD>                 

 

           (unknown)  (no       (unknown)  (unknown)  Signed    (units    (unkno

wn)



                    date)                                   unknown)  

 

           (unknown)  (no       (unknown)  (unknown)  Signs and  (units    (unkn

own)



                    date)                         symptoms of unknown)  



                                                   labor:           



                                                  discussed           

 

           (unknown)  (no       (unknown)  (unknown)  Smoking Status:  (units   

 (unknown)



                    date)                         Never smoker unknown)  

 

           (unknown)  (no       (unknown)  (unknown)  Social History  (units    

(unknown)



                    date)                                   unknown)  

 

           (unknown)  (no       (unknown)  (unknown)  Status: Acute  (units    (

unknown)



                    date)                                   unknown)  

 

           (unknown)  (no       (unknown)  (unknown)  Support   (units    (unkno

wn)



                    date)                         Person(s):: [place] unknown)  

 

           (unknown)  (no       (unknown)  (unknown)  Surgical History  (units  

  (unknown)



                    date)                         (Updated 09/10/22 unknown)  



                                                  @ 21:46 by Fatoumata Flores)             

 

           (unknown)  (no       (unknown)  (unknown)  Surrogate  (units    (unkn

own)



                    date)                         pregnancy?: no unknown)  

 

           (unknown)  (no       (unknown)  (unknown)  Symptoms since  (units    

(unknown)



                    date)                         LMP: Reports unknown)  



                                                  amenorrhea,           



                                                  nausea, fatigue,           



                                                  breast              



                                                  tenderness,           

 

           (unknown)  (no       (unknown)  (unknown)  Tdap Adult  (units    (unk

nown)



                    date)                         (Adacel) 23 unknown)  



                                                  Z23 - Encounter           



                                                  for immunization           

 

           (unknown)  (no       (unknown)  (unknown)  Teratogen  (units    (unkn

own)



                    date)                         Exposures since unknown)  



                                                  LMP/Conception:           



                                                  Denies              



                                                  prescription           



                                                  medications,           

 

           (unknown)  (no       (unknown)  (unknown)  Testing   (units    (unkno

wn)



                    date)                         Education unknown)  

 

           (unknown)  (no       (unknown)  (unknown)  Testing   (units    (unkno

wn)



                    date)                         education unknown)  



                                                  completed: group           



                                                  B strep, Spina           



                                                  bifida testing           



                                                  and Cell Free           

 

           (unknown)  (no       (unknown)  (unknown)  This note may  (units    (

unknown)



                    date)                         have been all or unknown)  



                                                  partially           



                                                  generated using           



                                                  voice recognition           

 

           (unknown)  (no       (unknown)  (unknown)  Tobacco +  (units    (unkn

own)



                    date)                         Substance Use unknown)  

 

           (unknown)  (no       (unknown)  (unknown)  Tobacco Status  (units    

(unknown)



                    date)                                   unknown)  

 

           (unknown)  (no       (unknown)  (unknown)  Trimester:  (units    (unk

nown)



                    date)                         second trimester unknown)  



                                                  Qualified           



                                                  Code(s): O24.415           



                                                  - Gestational           



                                                  diabetes            

 

           (unknown)  (no       (unknown)  (unknown)  Trimester:: 3rd  (units   

 (unknown)



                    date)                         Trimester unknown)  



                                                  (28wks-Del)           

 

           (unknown)  (no       (unknown)  (unknown)  Tumor (-10/2012)  (units  

  (unknown)



                    date)                                   unknown)  

 

           (unknown)  (no       (unknown)  (unknown)  Type(s) of  (units    (unk

nown)



                    date)                         exercise: unknown)  



                                                  bicycling           



                                                  (stationary           



                                                  bike), regular           



                                                  exercise, weight           

 

           (unknown)  (no       (unknown)  (unknown) UProtein Movement  (units  

  (unknown)



                    date)                         PreLabor FHR Fndl unknown)  



                                                  Ht Pres Edema           



                                                  Cerv Exam           



                                                  US/Comment Next           



                                                  Appt                

 

           (unknown)  (no       (unknown)  (unknown)  Ultrasound  (units    (unk

nown)



                    date)                         performed?: Yes unknown)  

 

           (unknown)  (no       (unknown)  (unknown)  VIS Given Date  (units    

(unknown)



                    date)                         VIS Provided VIS unknown)  



                                                  Publication Date           

 

           (unknown)  (no       (unknown)  (unknown)  Varicella/chicke  (units  

  (unknown)



                    date)                         n pox status: unknown)  



                                                  previous disease           

 

           (unknown)  (no       (unknown)  (unknown)  Visit Date:  (units    (un

known)



                    date)                         23 Last unknown)  



                                                  Updated by:           



                                                  Julieta Au MD                  

 

           (unknown)  (no       (unknown)  (unknown)  Visit Date:  (units    (un

known)



                    date)                         23 Last unknown)  



                                                  Updated by:           



                                                  Julieta Au MD                  

 

           (unknown)  (no       (unknown)  (unknown)  Visit Date:  (units    (un

known)



                    date)                         22 Last unknown)  



                                                  Updated by:           



                                                  Julieta Au MD                  

 

           (unknown)  (no       (unknown)  (unknown)  Visit Date:  (units    (un

known)



                    date)                         10/11/22 Last unknown)  



                                                  Updated by:           



                                                  Fanny Baker MD                  

 

           (unknown)  (no       (unknown)  (unknown)  Visit Date:  (units    (un

known)



                    date)                         22 Last unknown)  



                                                  Updated by:           



                                                  Julieta Au MD                  

 

           (unknown)  (no       (unknown)  (unknown)  Visit Date:  (units    (un

known)



                    date)                         22 Last unknown)  



                                                  Updated by: Berta Salinas P.A-C           

 

           (unknown)  (no       (unknown)  (unknown)  Visit Reasons:  (units    

(unknown)



                    date)                         OB w 3D US unknown)  

 

           (unknown)  (no       (unknown)  (unknown)  Vitals    (units    (unkno

wn)



                    date)                                   unknown)  

 

           (unknown)  (no       (unknown)  (unknown)  Vitamins and  (units    (u

nknown)



                    date)                         iron, Diet and unknown)  



                                                  weight gain, Fish           



                                                  and mercury           



                                                  intake, Caffeine           



                                                  use,                

 

           (unknown)  (no       (unknown)  (unknown)  WG        (units    (unkno

wn)



                    date)                                   unknown)  

 

           (unknown)  (no       (unknown)  (unknown)  Weeks     (units    (unkno

wn)



                    date)                         gestation:: 28 unknown)  

 

           (unknown)  (no       (unknown)  (unknown)  Weight 177 lb  (units    (

unknown)



                    date)                                   unknown)  

 

           (unknown)  (no       (unknown)  (unknown)  [place] teeth  (units    (u

nknown)



                    date)                         extracted unknown)  

 

           (unknown)  (no       (unknown)  (unknown)  Zika virus  (units    (unk

nown)



                    date)                         exposure: No unknown)  

 

           (unknown)  (no       (unknown)  (unknown)  accompanied by  (units    

(unknown)



                    date)                         her . She unknown)  



                                                  went through           



                                                  fertility           



                                                  treatments with           

 

           (unknown)  (no       (unknown)  (unknown)  alcohol intake:  (units   

 (unknown)



                    date)                         never     unknown)  

 

           (unknown)  (no       (unknown)  (unknown)  anterior  (units    (unkno

wn)



                    date)                         placenta. Routine unknown)  



                                                  precautions           



                                                  reviewed with the           



                                                  patient. Due to           



                                                  1st                 

 

           (unknown)  (no       (unknown)  (unknown)  anyone in either  (units  

  (unknown)



                    date)                         family with: unknown)  

 

           (unknown)  (no       (unknown)  (unknown)  baby's father  (units    (

unknown)



                    date)                         had a child with unknown)  



                                                  birth defects not           



                                                  listed above and           



                                                  Denies Other           

 

           (unknown)  (no       (unknown)  (unknown)  back pain.  (units    (unk

nown)



                    date)                         Advised   unknown)  



                                                  stretching, belly           



                                                  band, and PT if           



                                                  not improving.           



                                                  AFP was             

 

           (unknown)  (no       (unknown)  (unknown)  blood sugar  (units    (un

known)



                    date)                         diagnostic (Blood unknown)  



                                                  Glucose Test           



                                                  strips) #100 ea           



                                                  23 [Rx           

 

           (unknown)  (no       (unknown)  (unknown)  blood-glucose  (units    (

unknown)



                    date)                         meter #1 ea unknown)  



                                                  23 [Rx           



                                                  Confirmed           



                                                  23]           

 

           (unknown)  (no       (unknown)  (unknown)  by ultrasound is  (units  

  (unknown)



                    date)                         normal with good unknown)  



                                                  fetal movement,           



                                                  normal appearing           



                                                  fluid,              

 

           (unknown)  (no       (unknown)  (unknown)  caffeine: Yes  (units    (

unknown)



                    date)                         (1-2 cups unknown)  



                                                  tea/day)            

 

           (unknown)  (no       (unknown)  (unknown)  carbon monox  (units    (u

nknown)



                    date)                         detector in home: unknown)  



                                                  Yes                 

 

           (unknown)  (no       (unknown)  (unknown)  cfDNA normal  (units    (u

nknown)



                    date)                         female, AFP unknown)  



                                                  negative            

 

           (unknown)  (no       (unknown)  (unknown)  consistent with  (units   

 (unknown)



                    date)                         9 weeks 0 days. unknown)  



                                                  Yolk sac visible.           



                                                  Fetal heart rate           



                                                  178 beats           

 

           (unknown)  (no       (unknown)  (unknown)  current   (units    (unkno

wn)



                    date)                         occupational unknown)  



                                                  exposures/hazards           



                                                  : No                

 

           (unknown)  (no       (unknown)  (unknown)  daily servings  (units    

(unknown)



                    date)                         fruits/ve-4 unknown)  

 

           (unknown)  (no       (unknown)  (unknown)  described, visit  (units  

  (unknown)



                    date)                         schedule  unknown)  



                                                  reviewed,           



                                                  ultrasounds           



                                                  policy reviewed,           



                                                  coverage 24           

 

           (unknown)  (no       (unknown)  (unknown)  developing a  (units    (u

nknown)



                    date)                         pattern. No unknown)  



                                                  leakage of fluid           



                                                  or vaginal           



                                                  bleeding. No           



                                                  contractions           

 

           (unknown)  (no       (unknown)  (unknown)  discussed,  (units    (unk

nown)



                    date)                         tuberculosis unknown)  



                                                  exposure            



                                                  discussed, CMV           



                                                  discussed,           



                                                  Toxoplasmosis           

 

           (unknown)  (no       (unknown)  (unknown)  disorders,  (units    (unk

nown)



                    date)                         Denies Cystic unknown)  



                                                  Fibrosis, Denies           



                                                  Mental              



                                                  Retardation/Autis           



                                                  m, Denies           

 

           (unknown)  (no       (unknown)  (unknown)  do you feel safe  (units  

  (unknown)



                    date)                         at home: Yes unknown)  

 

           (unknown)  (no       (unknown)  (unknown)  during the past  (units   

 (unknown)



                    date)                         year weight has: unknown)  



                                                  remained stable           

 

           (unknown)  (no       (unknown)  (unknown)  education level:  (units  

  (unknown)



                    date)                         college   unknown)  



                                                  (Associate's           



                                                  degree)             

 

           (unknown)  (no       (unknown)  (unknown)  exposure,  (units    (unkn

own)



                    date)                         Medication use, unknown)  



                                                  Sauna/hot tub           



                                                  use, Dental care,           



                                                  Travel and           



                                                  Influenza           

 

           (unknown)  (no       (unknown)  (unknown)  fire      (units    (unkno

wn)



                    date)                         extinguisher in unknown)  



                                                  home: Yes           

 

           (unknown)  (no       (unknown)  (unknown)  firearms in  (units    (un

known)



                    date)                         home: Yes unknown)  



                                                  firearms unloaded           



                                                  and locked: Yes           

 

           (unknown)  (no       (unknown)  (unknown)  frequency: 5-6  (units    

(unknown)



                    date)                         times per week unknown)  

 

           (unknown)  (no       (unknown)  (unknown)  gestational sac  (units   

 (unknown)



                    date)                         with a fetus with unknown)  



                                                  a crown-rump           



                                                  length measuring           



                                                  2.29 cm             

 

           (unknown)  (no       (unknown)  (unknown)  have occurred.  (units    

(unknown)



                    date)                         If there are any unknown)  



                                                  questions, please           



                                                  contact the           



                                                  Medical Records           

 

           (unknown)  (no       (unknown)  (unknown)  hours a day and  (units   

 (unknown)



                    date)                         participation of unknown)  



                                                  father in           



                                                  prenatal care and           



                                                  office visits           

 

           (unknown)  (no       (unknown)  (unknown)  household  (units    (unkn

own)



                    date)                         members: spouse unknown)  



                                                  and family           



                                                  (father and           



                                                  father-in-law)           

 

           (unknown)  (no       (unknown)  (unknown)  housing: house  (units    

(unknown)



                    date)                                   unknown)  

 

           (unknown)  (no       (unknown)  (unknown)  illicit drugs  (units    (

unknown)



                    date)                         and Denies other unknown)  

 

           (unknown)  (no       (unknown)  (unknown)  kag       (units    (unkno

wn)



                    date)                                   unknown)  

 

           (unknown)  (no       (unknown)  (unknown)  lancets #100 ea  (units   

 (unknown)



                    date)                         23 [Rx unknown)  



                                                  Confirmed           



                                                  23]           

 

           (unknown)  (no       (unknown)  (unknown)  last visit.  (units    (un

known)



                    date)                         Plan: AFP today. unknown)  



                                                  20 wk u/s           



                                                  reviewed. F/U 4           



                                                  wks. Warning           



                                                  signs               

 

           (unknown)  (no       (unknown)  (unknown)  lifting and  (units    (un

known)



                    date)                         other (hiking) unknown)  

 

           (unknown)  (no       (unknown)  (unknown)  lives     (units    (unkno

wn)



                    date)                         independently: unknown)  



                                                  Yes                 

 

           (unknown)  (no       (unknown)  (unknown)  marital status:  (units   

 (unknown)



                    date)                            unknown)  

 

           (unknown)  (no       (unknown)  (unknown)  may occur.  (units    (unk

nown)



                    date)                         Occasional unknown)  



                                                  wrong-word or           



                                                  'sound-alike'           



                                                  substitutions may           



                                                  have                

 

           (unknown)  (no       (unknown)  (unknown)  medication only.  (units  

  (unknown)



                    date)                         Had 7     unknown)  



                                                  miscarriages. No           



                                                  bleeding with           



                                                  this pregnancy.           



                                                  This 1              

 

           (unknown)  (no       (unknown)  (unknown)  mellitus in  (units    (un

known)



                    date)                         pregnancy, unknown)  



                                                  controlled by           



                                                  oral hypoglycemic           



                                                  drugs               

 

           (unknown)  (no       (unknown)  (unknown)  metformin 500 mg  (units  

  (unknown)



                    date)                         PO BID 60 tabs unknown)  



                                                  0RF                 

 

           (unknown)  (no       (unknown)  (unknown)  metformin 500 mg  (units  

  (unknown)



                    date)                         tablet 500 mg PO unknown)  



                                                  BID #60 tabs           



                                                  23 [Rx           



                                                  Confirmed           



                                                  23]           

 

           (unknown)  (no       (unknown)  (unknown)  movement and  (units    (u

nknown)



                    date)                         denies VB, LOF, unknown)  



                                                  cramping and           



                                                  regular             



                                                  contractions. Pt           



                                                  reports low           

 

           (unknown)  (no       (unknown)  (unknown)  negative.  (units    (unkn

own)



                    date)                         Anatomy US unknown)  



                                                  scheduled for           



                                                  later today.           



                                                  Warning             



                                                  precautions           



                                                  reviewed.           

 

           (unknown)  (no       (unknown)  (unknown)  nickel Allergy  (units    

(unknown)



                    date)                         (Mild, Verified unknown)  



                                                  23 13:15)           

 

           (unknown)  (no       (unknown)  (unknown)  number of  (units    (unkn

own)



                    date)                         children: 0 unknown)  

 

           (unknown)  (no       (unknown)  (unknown)  occupational  (units    (u

nknown)



                    date)                         status: employed unknown)  



                                                  (active duty           



                                                  avionics            



                                                  ,           



                                                  [employer] job            

 

           (unknown)  (no       (unknown)  (unknown)  occurred due to  (units   

 (unknown)



                    date)                         the inherent unknown)  



                                                  limitations of           



                                                  voice recognition           



                                                  software. Please           

 

           (unknown)  (no       (unknown)  (unknown)  or cramping.  (units    (u

nknown)



                    date)                         Plan: 1 hour unknown)  



                                                  glucose ordered.           



                                                  Follow-up in 4           



                                                  weeks. Warning           

 

           (unknown)  (no       (unknown)  (unknown)  per minute.  (units    (un

known)



                    date)                         Normal ovaries unknown)  



                                                  bilaterally.           



                                                  Plan: Follow-up           



                                                  in 4 weeks.           



                                                  Warning             

 

           (unknown)  (no       (unknown)  (unknown)  pets and  (units    (unkno

wn)



                    date)                         animals: Yes (1 unknown)  



                                                  large dog,           



                                                  chickens)           

 

           (unknown)  (no       (unknown)  (unknown)  precautions,  (units    (u

nknown)



                    date)                         Listeriosis unknown)  



                                                  prevention and           



                                                  Rubella             



                                                  Immunization           

 

           (unknown)  (no       (unknown)  (unknown)  preeclampsia.  (units    (

unknown)



                    date)                                   unknown)  

 

           (unknown)  (no       (unknown)  (unknown)  pregnancy  (units    (unkn

own)



                    date)                         discussed unknown)  



                                                  starting baby           



                                                  aspirin per day           



                                                  to decrease her           



                                                  risk for            

 

           (unknown)  (no       (unknown)  (unknown)  prenat.vits,nan,  (units  

  (unknown)



                    date)                         min-iron-folic 1 unknown)  



                                                  tab PO DAILY           



                                                  22 [History           



                                                  Confirmed           

 

           (unknown)  (no       (unknown)  (unknown)  read the note  (units    (

unknown)



                    date)                         carefully and unknown)  



                                                  recognize, using           



                                                  context, where           



                                                  these               



                                                  substitutions           

 

           (unknown)  (no       (unknown)  (unknown)  reviewed.  (units    (unkn

own)



                    date)                                   unknown)  

 

           (unknown)  (no       (unknown)  (unknown)  seatbelt use:  (units    (

unknown)



                    date)                         always    unknown)  

 

           (unknown)  (no       (unknown)  (unknown)  second hand  (units    (un

known)



                    date)                         exposure: Yes unknown)  



                                                  (father-in-law           



                                                  smokes outside           



                                                  the house)           

 

           (unknown)  (no       (unknown)  (unknown)  signs reviewed.  (units   

 (unknown)



                    date)                         cfDNA ordered. unknown)  

 

           (unknown)  (no       (unknown)  (unknown)  signs reviewed.  (units   

 (unknown)



                    date)                                   unknown)  

 

           (unknown)  (no       (unknown)  (unknown)  software.  (units    (unkn

own)



                    date)                         Although every unknown)  



                                                  effort is made to           



                                                  edit content,           



                                                  transcription           



                                                  errors              

 

           (unknown)  (no       (unknown)  (unknown)  special madi  (units    (

unknown)



                    date)                         needs: No unknown)  

 

           (unknown)  (no       (unknown)  (unknown)  substance use  (units    (

unknown)



                    date)                         type: does not unknown)  



                                                  use                 

 

           (unknown)  (no       (unknown)  (unknown)  travel history:  (units   

 (unknown)



                    date)                         over 6 months ago unknown)  

 

           (unknown)  (no       (unknown)  (unknown)  urinary   (units    (unkno

wn)



                    date)                         frequency and unknown)  



                                                  irritability           

 

           (unknown)  (no       (unknown)  (unknown)  vaccine (Annual  (units   

 (unknown)



                    date)                         flu, Phizer Covid unknown)  



                                                  x2)                 

 

           (unknown)  (no       (unknown)  (unknown)  w0d 4 wks  (units    (unkn

own)



                    date)                                   unknown)  

 

           (unknown)  (no       (unknown)  (unknown)  was not using  (units    (

unknown)



                    date)                         any infertility unknown)  



                                                  medications.           



                                                  Ultrasound: An           



                                                  intrauterine           

 

           (unknown)  (no       (unknown)  (unknown)  water heater  (units    (u

nknown)



                    date)                         temp set < 120 unknown)  



                                                  deg: Yes            

 

           (unknown)  (no       (unknown)  (unknown)  well-balanced  (units    (

unknown)



                    date)                         diet: daily or unknown)  



                                                  most days           

 

           (unknown)  (no       (unknown)  (unknown)  went away with  (units    

(unknown)



                    date)                         laying down. No unknown)  



                                                  vaginal bleeding.           



                                                  No fevers. Fetal           



                                                  heart tone           

 

           (unknown)  (no       (unknown)  (unknown)  while pregnant)  (units   

 (unknown)



                    date)                                   unknown)  

 

           (unknown)  (no       (unknown)  (unknown)  wks. Warning  (units    (u

nknown)



                    date)                         signs for PTL unknown)  



                                                  reviewed.           

 

           (unknown)  (no       (unknown)  (unknown)  working smoke  (units    (

unknown)



                    date)                         detector in home: unknown)  



                                                  Yes                 









                                         Result panel 18









           (unknown)  (no       (unknown)  (unknown)  (no value)  (units    (unk

nown)



                    date)                                   unknown)  

 

           (unknown)  (no       (unknown)  (unknown)  (+12 lb) 120/58  (units   

 (unknown)



                    date)                         N         unknown)  

 

           (unknown)  (no       (unknown)  (unknown)  (+13 lb) 104/54  (units   

 (unknown)



                    date)                         N         unknown)  

 

           (unknown)  (no       (unknown)  (unknown)  (+18 lb) 122/60  (units   

 (unknown)



                    date)                         N         unknown)  

 

           (unknown)  (no       (unknown)  (unknown)  (+22 lb) 110/62  (units   

 (unknown)



                    date)                         N         unknown)  

 

           (unknown)  (no       (unknown)  (unknown)  (+7 lb) 128/66 N  (units  

  (unknown)



                    date)                                   unknown)  

 

           (unknown)  (no       (unknown)  (unknown)  (+8 lb) 122/68 N  (units  

  (unknown)



                    date)                                   unknown)  

 

           (unknown)  (no       (unknown)  (unknown)  (1) Gestational  (units   

 (unknown)



                    date)                         diabetes: unknown)  

 

           (unknown)  (no       (unknown)  (unknown)  (2) 28 weeks  (units    (u

nknown)



                    date)                         gestation of unknown)  



                                                  pregnancy:           

 

           (unknown)  (no       (unknown)  (unknown)  **ADDENDUM**  (units    (u

nknown)



                    date)                                   unknown)  

 

           (unknown)  (no       (unknown)  (unknown)  **Genetic  (units    (unkn

own)



                    date)                         Screening/Teratol unknown)  



                                                  ogy Counseling -           



                                                  Includes patient,           



                                                  baby's father, or           

 

           (unknown)  (no       (unknown)  (unknown)  -?-?-?-?-?-?-?-?  (units  

  (unknown)



                    date)                         -?-?-?-?  unknown)  

 

           (unknown)  (no       (unknown)  (unknown)  0.5 mL IM Right  (units   

 (unknown)



                    date)                         Deltoid [id number] unknown)  



                                                  24            



                                                  [id number]           



                                                  [employer]           

 

           (unknown)  (no       (unknown)  (unknown)  23  (units    (unkno

wn)



                    date)                                   unknown)  

 

           (unknown)  (no       (unknown)  (unknown)  23 Single  (units   

 (unknown)



                    date)                         Vaccine 21 unknown)  

 

           (unknown)  (no       (unknown)  (unknown)  23  (units    (unkno

wn)



                    date)                                   unknown)  

 

           (unknown)  (no       (unknown)  (unknown)  23]  (units    (unkn

own)



                    date)                                   unknown)  

 

           (unknown)  (no       (unknown)  (unknown)  23 1619  (units    (

unknown)



                    date)                                   unknown)  

 

           (unknown)  (no       (unknown)  (unknown)  23 1620  (units    (

unknown)



                    date)                                   unknown)  

 

           (unknown)  (no       (unknown)  (unknown)  04/15/23  (units    (unkno

wn)



                    date)                         Ultrasound #1 31w unknown)  



                                                  1d                  

 

           (unknown)  (no       (unknown)  (unknown)  [id number]   (units    (unkno

wn)



                    date)                                   unknown)  

 

           (unknown)  (no       (unknown)  (unknown)  22  (units    (unkno

wn)



                    date)                                   unknown)  

 

           (unknown)  (no       (unknown)  (unknown)  10/11/22  (units    (unkno

wn)



                    date)                                   unknown)  

 

           (unknown)  (no       (unknown)  (unknown)  22  (units    (unkno

wn)



                    date)                                   unknown)  

 

           (unknown)  (no       (unknown)  (unknown)  22  (units    (unkno

wn)



                    date)                                   unknown)  

 

           (unknown)  (no       (unknown)  (unknown)  12w 6d 163 lb  (units    (

unknown)



                    date)                                   unknown)  

 

           (unknown)  (no       (unknown)  (unknown)  13:15     (units    (unkno

wn)



                    date)                                   unknown)  

 

           (unknown)  (no       (unknown)  (unknown)  16w 6d 167 lb  (units    (

unknown)



                    date)                                   unknown)  

 

           (unknown)  (no       (unknown)  (unknown)  20w 5d 168 lb  (units    (

unknown)



                    date)                                   unknown)  

 

           (unknown)  (no       (unknown)  (unknown)  24w 6d 173 lb  (units    (

unknown)



                    date)                                   unknown)  

 

           (unknown)  (no       (unknown)  (unknown)  28w 6d 177 lb  (units    (

unknown)



                    date)                                   unknown)  

 

           (unknown)  (no       (unknown)  (unknown)  3 wks     (units    (unkno

wn)



                    date)                                   unknown)  

 

           (unknown)  (no       (unknown)  (unknown)  4 wk      (units    (unkno

wn)



                    date)                                   unknown)  

 

           (unknown)  (no       (unknown)  (unknown)  8w 6d 162 lb  (units    (u

nknown)



                    date)                                   unknown)  

 

           (unknown)  (no       (unknown)  (unknown)  Abnormal lab  (units    (u

nknown)



                    date)                         values 1st unknown)  



                                                  trimester:           



                                                  discussed           

 

           (unknown)  (no       (unknown)  (unknown)  Abnormal lab  (units    (u

nknown)



                    date)                         values 2nd unknown)  



                                                  trimester:           



                                                  discussed           

 

           (unknown)  (no       (unknown)  (unknown)  Adacel(Tdap  (units    (un

known)



                    date)                         Adolesn/Adult)(PF unknown)  



                                                  )                   

 

           (unknown)  (no       (unknown)  (unknown)  Add'l Plan  (units    (unk

nown)



                    date)                         Details   unknown)  

 

           (unknown)  (no       (unknown)  (unknown)  Addendum  (units    (unkno

wn)



                    date)                         Documented By: unknown)  



                                                  Julieta Au MD                  

 

           (unknown)  (no       (unknown)  (unknown)  Addendum Signed  (units   

 (unknown)



                    date)                         By:       unknown)  



                                                  <Electronically           



                                                  signed by           



                                                  Julieta Au,           

 

           (unknown)  (no       (unknown)  (unknown)  Administered by:  (units  

  (unknown)



                    date)                         Edelmira Montero, unknown)  



                                                  MA on 23           



                                                  13:25               

 

           (unknown)  (no       (unknown)  (unknown)  Age/Sex: 27 / F  (units   

 (unknown)



                    date)                         Date of Service: unknown)  

 

           (unknown)  (no       (unknown)  (unknown)  Allergies  (units    (unkn

own)



                    date)                         ()   unknown)  

 

           (unknown)  (no       (unknown)  (unknown)  Allergies  (units    (unkn

own)



                    date)                                   unknown)  

 

           (unknown)  (no       (unknown)  (unknown)  SARINA Barker  (units    (

unknown)



                    date)                         [id number]     unknown)  

 

           (unknown)  (no       (unknown)  (unknown)  Anesthesia  (units    (unk

nown)



                    date)                                   unknown)  

 

           (unknown)  (no       (unknown)  (unknown)  Aneuploidy  (units    (unk

nown)



                    date)                         Screening unknown)  



                                                  Offered: Accepted           



                                                  (wants CFDNA)           

 

           (unknown)  (no       (unknown)  (unknown)  Anticipated  (units    (un

known)



                    date)                         course of unknown)  



                                                  prenatal care:           



                                                  discussed           

 

           (unknown)  (no       (unknown)  (unknown)  Anxiety (-2016)  (units   

 (unknown)



                    date)                                   unknown)  

 

           (unknown)  (no       (unknown)  (unknown)  Arrhythmia  (units    (unk

nown)



                    date)                                   unknown)  

 

           (unknown)  (no       (unknown)  (unknown)  Assessment and  (units    

(unknown)



                    date)                         Plan      unknown)  

 

           (unknown)  (no       (unknown)  (unknown)  Attending Dr:  (units    (

unknown)



                    date)                         Julieta Au unknown)  



                                                  MD                  

 

           (unknown)  (no       (unknown)  (unknown)  Libra presents  (units   

 (unknown)



                    date)                         today for routine unknown)  



                                                  OB f/u at 20w5d.           



                                                  She reports good           



                                                  fetal               

 

           (unknown)  (no       (unknown)  (unknown)  BMI 28.5  (units    (unkno

wn)



                    date)                                   unknown)  

 

           (unknown)  (no       (unknown)  (unknown)  /62  (units    (unkn

own)



                    date)                                   unknown)  

 

           (unknown)  (no       (unknown)  (unknown)  Birth     (units    (unkno

wn)



                    date)                         Plan/Preferences unknown)  

 

           (unknown)  (no       (unknown)  (unknown)  Birth Planning  (units    

(unknown)



                    date)                                   unknown)  

 

           (unknown)  (no       (unknown)  (unknown)  Blood Pressure  (units    

(unknown)



                    date)                         Location Lt unknown)  



                                                  brachial            

 

           (unknown)  (no       (unknown)  (unknown)  Blood     (units    (unkno

wn)



                    date)                         transfusions?: unknown)  



                                                  yes (Never had           



                                                  but would accept)           

 

           (unknown)  (no       (unknown)  (unknown)  Breastfeeding:  (units    

(unknown)



                    date)                         discussed unknown)  

 

           (unknown)  (no       (unknown)  (unknown)  Chicken pox  (units    (un

known)



                    date)                         (-)   unknown)  

 

           (unknown)  (no       (unknown)  (unknown)  Childbirth  (units    (unk

nown)



                    date)                         Classes:  unknown)  



                                                  discussed           

 

           (unknown)  (no       (unknown)  (unknown)  Conceived  (units    (unkn

own)



                    date)                         naturally this unknown)  



                                                  pregnancy           

 

           (unknown)  (no       (unknown)  (unknown)  Confirmed  (units    (unkn

own)



                    date)                         23] unknown)  

 

           (unknown)  (no       (unknown)  (unknown)  Current Estimate  (units  

  (unknown)



                    date)                         23  unknown)  



                                                  Ultrasound #2 30w           



                                                  4d                  

 

           (unknown)  (no       (unknown)  (unknown)  Current   (units    (unkno

wn)



                    date)                         Pregnancy History unknown)  

 

           (unknown)  (no       (unknown)  (unknown)  DNA       (units    (unkno

wn)



                    date)                                   unknown)  

 

           (unknown)  (no       (unknown)  (unknown)  : 1995  (units   

 (unknown)



                    date)                         Acct:[id number] unknown)  

 

           (unknown)  (no       (unknown)  (unknown)  Date of positive  (units  

  (unknown)



                    date)                         home pregnancy unknown)  



                                                  test: 08/15/22           

 

           (unknown)  (no       (unknown)  (unknown)  Date      (units    (unkno

wn)



                    date)                                   unknown)  

 

           (unknown)  (no       (unknown)  (unknown) Denies Congenital  (units  

  (unknown)



                    date)                         Heart Defect, unknown)  



                                                  Denies Down           



                                                  Syndrome, Denies           



                                                  Muscular            



                                                  Dystrophy,           

 

           (unknown)  (no       (unknown)  (unknown)  Denies Maternal  (units   

 (unknown)



                    date)                         Metabolic unknown)  



                                                  Disorder (EG,TYPE           



                                                  1 Diabetes, PKU),           



                                                  Denies Patient or           

 

           (unknown)  (no       (unknown)  (unknown)  Denies Neural  (units    (

unknown)



                    date)                         Tube Defect unknown)  



                                                  (Meningomyelocele           



                                                  , Spina Bifida,           



                                                  or Anencephaly),           

 

           (unknown)  (no       (unknown)  (unknown)  Denies [place]  (units    (

unknown)



                    date)                         Cell Disease or unknown)  



                                                  Trait (),           



                                                  Denies Hemophilia           



                                                  or other blood           

 

           (unknown)  (no       (unknown)  (unknown)  Denies [place]  (units  

  (unknown)



                    date)                         (Ashkenazi unknown)  



                                                  [facility], [place],           



                                                  [place]),           



                                                  Denies [place]           

 

           (unknown)  (no       (unknown)  (unknown)  Depression  (units    (unk

nown)



                    date)                         (-)   unknown)  

 

           (unknown)  (no       (unknown)  (unknown)  Depression:  (units    (un

known)



                    date)                         discussed unknown)  

 

           (unknown)  (no       (unknown)  (unknown)  Dept at   (units    (unkno

wn)



                    date)                         [phone]. unknown)  

 

           (unknown)  (no       (unknown)  (unknown)  Diet and  (units    (unkno

wn)



                    date)                         Exercise  unknown)  

 

           (unknown)  (no       (unknown)  (unknown)  Disease   (units    (unkno

wn)



                    date)                         (Ashkenazi unknown)  



                                                  Advent), Denies           



                                                  Familial            



                                                  Dysautonomia           



                                                  (Ashkenazi           



                                                  Advent),            

 

           (unknown)  (no       (unknown)  (unknown)  Documented By:  (units    

(unknown)



                    date)                         Julieta Au unknown)  



                                                  MD 23 1314           

 

           (unknown)  (no       (unknown)  (unknown)  Dose Route Admin  (units  

  (unknown)



                    date)                         Location Lot unknown)  



                                                  Number Expiration           



                                                  Date NDC            

 

           (unknown)  (no       (unknown)  (unknown)  MEHNAZ Calculator  (units    

(unknown)



                    date)                                   unknown)  

 

           (unknown)  (no       (unknown)  (unknown)  EGA Weight BP  (units    (

unknown)



                    date)                         UGlucose  unknown)  

 

           (unknown)  (no       (unknown)  (unknown)  Eczema (-2000)  (units    

(unknown)



                    date)                                   unknown)  

 

           (unknown)  (no       (unknown)  (unknown)  Eligibility  (units    (un

known)



                    date)                         Eligibility Date unknown)  



                                                  Funding Source           

 

           (unknown)  (no       (unknown)  (unknown)  Estimated  (units    (unkn

own)



                    date)                         Delivery Date unknown)  



                                                  Method Current           

 

           (unknown)  (no       (unknown)  (unknown)  Exercise and  (units    (u

nknown)



                    date)                         activity, unknown)  



                                                  work/environmenta           



                                                  l/hazards, Sexual           



                                                  activity, X-ray           

 

           (unknown)  (no       (unknown)  (unknown)  F/u in 4 wks.  (units    (

unknown)



                    date)                                   unknown)  

 

           (unknown)  (no       (unknown)  (unknown)  Family History  (units    

(unknown)



                    date)                         (Updated 09/10/22 unknown)  



                                                  @ 21:49 by Fatoumata Flores)             

 

           (unknown)  (no       (unknown)  (unknown)  Family/Other  (units    (u

nknown)



                    date)                         Multiple  unknown)  



                                                  sclerosis           

 

           (unknown)  (no       (unknown)  (unknown)  Fasting BS's:  (units    (

unknown)



                    date)                         , 2 hr PP unknown)  



                                                  .             

 

           (unknown)  (no       (unknown)  (unknown)  Father    (units    (unkno

wn)



                    date)                         Hypertension unknown)  

 

           (unknown)  (no       (unknown)  (unknown)  Father of Baby:  (units   

 (unknown)



                    date)                         same      unknown)  

 

           (unknown)  (no       (unknown)  (unknown)  [facility]  (units   

 (unknown)



                    date)                         Associates unknown)  

 

           (unknown)  (no       (unknown)  (unknown)  First Trimester  (units   

 (unknown)



                    date)                         Education unknown)  



                                                  Checklist           

 

           (unknown)  (no       (unknown)  (unknown)  Foot pain  (units    (unkn

own)



                    date)                         (-)   unknown)  

 

           (unknown)  (no       (unknown)  (unknown)   (SAB  (units    (unkn

own)



                    date)                         x7),Fertility Tx, unknown)  



                                                  meds only,           



                                                  started on baby           



                                                  aspirin             



                                                  10/11/2022           

 

           (unknown)  (no       (unknown)  (unknown)  GDM, on   (units    (unkno

wn)



                    date)                         Metformin 500mg unknown)  



                                                  BID                 

 

           (unknown)  (no       (unknown)  (unknown)  Genetic   (units    (unkno

wn)



                    date)                         Screening + unknown)  



                                                  Counseling           

 

           (unknown)  (no       (unknown)  (unknown)  Genetic   (units    (unkno

wn)



                    date)                         Screening unknown)  

 

           (unknown)  (no       (unknown)  (unknown)  Gestational  (units    (un

known)



                    date)                         diabetes mellitus unknown)  



                                                  control: oral           



                                                  hypoglycemic-cont           



                                                  rolled              

 

           (unknown)  (no       (unknown)  (unknown)  Grandfather  (units    (un

known)



                    date)                          Cancer unknown)  

 

           (unknown)  (no       (unknown)  (unknown)  Grandfather  (units    (un

known)



                    date)                          Stroke unknown)  

 

           (unknown)  (no       (unknown)  (unknown)  Grandmother  (units    (un

known)



                    date)                          History unknown)  



                                                  of heart disease           

 

           (unknown)  (no       (unknown)  (unknown)  Grandmother  (units    (un

known)



                    date)                          Multiple unknown)  



                                                  sclerosis           

 

           (unknown)  (no       (unknown)  (unknown)   8  (units    (unkn

own)



                    date)                         Multiple births 0 unknown)  

 

           (unknown)  (no       (unknown)  (unknown)  HIV risk  (units    (unkno

wn)



                    date)                         evaluation: low unknown)  



                                                  risk                

 

           (unknown)  (no       (unknown)  (unknown)  [facility]  (units    (

unknown)



                    date)                         Education unknown)  

 

           (unknown)  (no       (unknown)  (unknown)  Health center  (units    (

unknown)



                    date)                         information: unknown)  



                                                  nature of           



                                                  practice            



                                                  discussed,           



                                                  prenatal            



                                                  personnel           

 

           (unknown)  (no       (unknown)  (unknown)  Height 5 ft 6 in  (units  

  (unknown)



                    date)                                   unknown)  

 

           (unknown)  (no       (unknown)  (unknown)  Hepatitis C risk  (units  

  (unknown)



                    date)                         evaluation: low unknown)  



                                                  risk                

 

           (unknown)  (no       (unknown)  (unknown)  History of  (units    (unk

nown)



                    date)                         Hepatitis B: No unknown)  

 

           (unknown)  (no       (unknown)  (unknown)  History of  (units    (unk

nown)



                    date)                         Hepatitis C: No unknown)  

 

           (unknown)  (no       (unknown)  (unknown)  History of  (units    (unk

nown)



                    date)                         recurrent unknown)  



                                                  miscarriages           

 

           (unknown)  (no       (unknown)  (unknown)  [facility]  (units    (u

nknown)



                    date)                                   unknown)  

 

           (unknown)  (no       (unknown)  (unknown)  [facility]  (units    (u

nknown)



                    date)                         Chorea, Denies unknown)  



                                                  Other inherited           



                                                  genetic or           



                                                  chromosomal           



                                                  disorder,           

 

           (unknown)  (no       (unknown)  (unknown)  , [place]  (units    (

unknown)



                    date)                         [place]  unknown)  

 

           (unknown)  (no       (unknown)  (unknown)  Hx #   (units    (u

nknown)



                    date)                         Pregnancies 0 unknown)  



                                                  Elective            



                                                  abortions 0           

 

           (unknown)  (no       (unknown)  (unknown)  Hx # Term  (units    (unkn

own)



                    date)                         Pregnancies 0 unknown)  



                                                  Ectopic             



                                                  pregnancies 0           

 

           (unknown)  (no       (unknown)  (unknown)  Immunizations  (units    (

unknown)



                    date)                                   unknown)  

 

           (unknown)  (no       (unknown)  (unknown)  Infant will be  (units    

(unknown)



                    date)                         adopted?: no unknown)  

 

           (unknown)  (no       (unknown)  (unknown)  Infection  (units    (unkn

own)



                    date)                         History   unknown)  

 

           (unknown)  (no       (unknown)  (unknown)  Infectious  (units    (unk

nown)



                    date)                         Disease Education unknown)  

 

           (unknown)  (no       (unknown)  (unknown)  Infectious  (units    (unk

nown)



                    date)                         disease exposure: unknown)  



                                                  chicken pox           



                                                  immunity            



                                                  discussed,           



                                                  hepatitis risk           

 

           (unknown)  (no       (unknown)  (unknown)  Infertility  (units    (un

known)



                    date)                         ()   unknown)  

 

           (unknown)  (no       (unknown)  (unknown)  Initial Weight:  (units   

 (unknown)



                    date)                         155 lb    unknown)  

 

           (unknown)  (no       (unknown)  (unknown)  Initials  (units    (unkno

wn)



                    date)                                   unknown)  

 

           (unknown)  (no       (unknown)  (unknown)  Intake Clinical  (units   

 (unknown)



                    date)                         Staff     unknown)  

 

           (unknown)  (no       (unknown)  (unknown)  Intake Note:  (units    (u

nknown)



                    date)                                   unknown)  

 

           (unknown)  (no       (unknown)  (unknown)  Intake performed  (units  

  (unknown)



                    date)                         by:       unknown)  



                                                  Edelmira Montero           

 

           (unknown)  (no       (unknown)  (unknown)  Intake    (units    (unkno

wn)



                    date)                                   unknown)  

 

           (unknown)  (no       (unknown)  (unknown)  Irregular  (units    (unkn

own)



                    date)                         menstrual cycle unknown)  



                                                  ()             

 

           (unknown)  (no       (unknown)  (unknown)  LM        (units    (unkno

wn)



                    date)                                   unknown)  

 

           (unknown)  (no       (unknown)  (unknown)  Lipoma    (units    (unkno

wn)



                    date)                                   unknown)  

 

           (unknown)  (no       (unknown)  (unknown)  Live with  (units    (unkn

own)



                    date)                         someone with TB unknown)  



                                                  or exposed to TB:           



                                                  No                  

 

           (unknown)  (no       (unknown)  (unknown)  Loc: [facility]  (units    (unkno

wn)



                    date)                                   unknown)  

 

           (unknown)  (no       (unknown)  (unknown)  MD> 23  (units    (u

nknown)



                    date)                         1620      unknown)  

 

           (unknown)  (no       (unknown)  (unknown)    (units    (u

nknown)



                    date)                                   unknown)  

 

           (unknown)  (no       (unknown)  (unknown)  Marital status:  (units   

 (unknown)



                    date)                            unknown)  

 

           (unknown)  (no       (unknown)  (unknown)  Medical History  (units   

 (unknown)



                    date)                         (Updated 23 unknown)  



                                                  @ 16:18 by           



                                                  Julieta Au MD)                 

 

           (unknown)  (no       (unknown)  (unknown)  Medications  (units    (un

known)



                    date)                                   unknown)  

 

           (unknown)  (no       (unknown)  (unknown)  Medications:  (units    (u

nknown)



                    date)                                   unknown)  

 

           (unknown)  (no       (unknown)  (unknown)  Mother Gastric  (units    

(unknown)



                    date)                         cancer    unknown)  

 

           (unknown)  (no       (unknown)  (unknown)  N No 142 13 N/A  (units   

 (unknown)



                    date)                         4wk       unknown)  

 

           (unknown)  (no       (unknown)  (unknown)  N No no 143 17  (units    

(unknown)



                    date)                         N/A absent AFP 4 unknown)  



                                                  wks                 

 

           (unknown)  (no       (unknown)  (unknown)  N No no 178 9  (units    (

unknown)



                    date)                         N/A absent unknown)  



                                                  long/closed AGA 9           

 

           (unknown)  (no       (unknown)  (unknown)  N Yes no 141 24  (units   

 (unknown)



                    date)                         N/A absent 4 wks unknown)  

 

           (unknown)  (no       (unknown)  (unknown)  N Yes no 142 20  (units   

 (unknown)



                    date)                         N/A absent AFP unknown)  



                                                  negative            

 

           (unknown)  (no       (unknown)  (unknown)  N Yes no 144 29  (units   

 (unknown)



                    date)                         Vertex absent AGA unknown)  



                                                  29w5d               

 

           (unknown)  (no       (unknown)  (unknown)  NF        (units    (unkno

wn)



                    date)                                   unknown)  

 

           (unknown)  (no       (unknown)  (unknown)  New       (units    (unkno

wn)



                    date)                                   unknown)  

 

           (unknown)  (no       (unknown)  (unknown)  Not [facility] Eligible  (units  

  (unknown)



                    date)                         23 Private unknown)  



                                                  Funds               

 

           (unknown)  (no       (unknown)  (unknown)  Notes     (units    (unkno

wn)



                    date)                                   unknown)  

 

           (unknown)  (no       (unknown)  (unknown)  Number of Living  (units  

  (unknown)



                    date)                         Children 0 unknown)  

 

           (unknown)  (no       (unknown)  (unknown)  Number of  (units    (unkn

own)



                    date)                         fetuses:: Single unknown)  

 

           (unknown)  (no       (unknown)  (unknown)  Nutrition and  (units    (

unknown)



                    date)                         weight gain unknown)  



                                                  counseling:           



                                                  special diet:           



                                                  discussed           

 

           (unknown)  (no       (unknown)  (unknown)  OB Office Visit  (units   

 (unknown)



                    date)                                   unknown)  

 

           (unknown)  (no       (unknown)  (unknown)  OB Visit Log  (units    (u

nknown)



                    date)                                   unknown)  

 

           (unknown)  (no       (unknown)  (unknown)  OB check  (units    (unkno

wn)



                    date)                                   unknown)  

 

           (unknown)  (no       (unknown)  (unknown)  On U/S: AGA  (units    (un

known)



                    date)                         29w5d. 3#4oz unknown)  



                                                  66%ile. Nl AFV.           



                                                  Plan: Metformin           



                                                  500mg BID. F/U 3           

 

           (unknown)  (no       (unknown)  (unknown)  On birth control  (units  

  (unknown)



                    date)                         at conception?: unknown)  



                                                  No                  

 

           (unknown)  (no       (unknown)  (unknown)  Orders    (units    (unkno

wn)



                    date)                                   unknown)  

 

           (unknown)  (no       (unknown)  (unknown)  Orders:   (units    (unkno

wn)



                    date)                                   unknown)  

 

           (unknown)  (no       (unknown)  (unknown)  Other Estimates  (units   

 (unknown)



                    date)                         23 LMP unknown)  



                                                  (Certain) 32w 5d           

 

           (unknown)  (no       (unknown)  (unknown)  Ovarian cyst  (units    (u

nknown)



                    date)                         ()   unknown)  

 

           (unknown)  (no       (unknown)  (unknown)  PCOS (polycystic  (units  

  (unknown)



                    date)                         ovarian syndrome) unknown)  

 

           (unknown)  (no       (unknown)  (unknown)  PFSH      (units    (unkno

wn)



                    date)                                   unknown)  

 

           (unknown)  (no       (unknown)  (unknown)  Pap performed?:  (units   

 (unknown)



                    date)                         No        unknown)  

 

           (unknown)  (no       (unknown)  (unknown)  Para 0    (units    (unkno

wn)



                    date)                         Spontaneous unknown)  



                                                  abortions 7           

 

           (unknown)  (no       (unknown)  (unknown)  Partner history  (units   

 (unknown)



                    date)                         of STD: denies hx unknown)  

 

           (unknown)  (no       (unknown)  (unknown)  Partner history  (units   

 (unknown)



                    date)                         of genital unknown)  



                                                  herpes: No           

 

           (unknown)  (no       (unknown)  (unknown)  Partner: [place]  (units    (

unknown)



                    date)                         [place]  unknown)  

 

           (unknown)  (no       (unknown)  (unknown)  Patient comes in  (units  

  (unknown)



                    date)                         for follow-up OB unknown)  



                                                  visit. She had           



                                                  some pelvic pain           



                                                  that                

 

           (unknown)  (no       (unknown)  (unknown)  Patient presents  (units  

  (unknown)



                    date)                         for a new OB unknown)  



                                                  visit at 8 weeks           



                                                  gestation. She is           

 

           (unknown)  (no       (unknown)  (unknown)  Patient presents  (units  

  (unknown)



                    date)                         for a routine unknown)  



                                                  prenatal visit,           



                                                  accompanied by           



                                                  her .           

 

           (unknown)  (no       (unknown)  (unknown)  Patient's age 33  (units  

  (unknown)



                    date)                         years or older as unknown)  



                                                  of estimated date           



                                                  of delivery: No           

 

           (unknown)  (no       (unknown)  (unknown)  Patient:  (units    (unkno

wn)



                    date)                         Libra Prieto unknown)  



                                                  MR#: [id number]            

 

           (unknown)  (no       (unknown)  (unknown)  Pediatrician:  (units    (

unknown)



                    date)                         DOD [place] vs unknown)  



                                                  [facility]           



                                                  [facility]           



                                                  [facility]             

 

           (unknown)  (no       (unknown)  (unknown)  Performing  (units    (unk

nown)



                    date)                         Provider: unknown)  



                                                  Julieta Au MD                  

 

           (unknown)  (no       (unknown)  (unknown)  Personal history  (units  

  (unknown)



                    date)                         of STD: denies hx unknown)  

 

           (unknown)  (no       (unknown)  (unknown)  Personal history  (units  

  (unknown)



                    date)                         of genital unknown)  



                                                  herpes: No           

 

           (unknown)  (no       (unknown)  (unknown)  Plantar   (units    (unkno

wn)



                    date)                         fasciitis unknown)  

 

           (unknown)  (no       (unknown)  (unknown)  Position Sitting  (units  

  (unknown)



                    date)                                   unknown)  

 

           (unknown)  (no       (unknown)  (unknown)  Postpartum  (units    (unk

nown)



                    date)                         family    unknown)  



                                                  planning/Tubal           



                                                  sterilization:           



                                                  further             



                                                  discussion needed           

 

           (unknown)  (no       (unknown)  (unknown)  Pregnancy  (units    (unkn

own)



                    date)                         History   unknown)  

 

           (unknown)  (no       (unknown)  (unknown)  Pregnancy type::  (units  

  (unknown)



                    date)                         Other Normal unknown)  



                                                  Pregnancy           

 

           (unknown)  (no       (unknown)  (unknown)  Prenatal  (units    (unkno

wn)



                    date)                         Education unknown)  

 

           (unknown)  (no       (unknown)  (unknown)  Prenatal Initial  (units  

  (unknown)



                    date)                         Assessment unknown)  

 

           (unknown)  (no       (unknown)  (unknown)  Prenatal  (units    (unkno

wn)



                    date)                         Specific  unknown)  



                                                  Issues/Plans           

 

           (unknown)  (no       (unknown)  (unknown)  Prenatal  (units    (unkno

wn)



                    date)                         Testing:  unknown)  



                                                  discussed           

 

           (unknown)  (no       (unknown)  (unknown)  Prenatal Visit  (units    

(unknown)



                    date)                                   unknown)  

 

           (unknown)  (no       (unknown)  (unknown)  Prenatal  (units    (unkno

wn)



                    date)                         education packet: unknown)  



                                                  Child birth           



                                                  education/plan,           



                                                  Pregnancy           



                                                  symptoms,           

 

           (unknown)  (no       (unknown)  (unknown)  Primary Care  (units    (u

nknown)



                    date)                         Provider: BASIM [facility] unknown)  



                                                  [place]              

 

           (unknown)  (no       (unknown)  (unknown)  Primary Ob  (units    (unk

nown)



                    date)                         Provider: unknown)  



                                                  Julieta Au           

 

           (unknown)  (no       (unknown)  (unknown)  Prior     (units    (unkno

wn)



                    date)                         GBS-Infected unknown)  



                                                  child: No           

 

           (unknown)  (no       (unknown)  (unknown)  Providers  (units    (unkn

own)



                    date)                                   unknown)  

 

           (unknown)  (no       (unknown)  (unknown)  Pt presents for  (units   

 (unknown)



                    date)                         a PNV at 16+6 unknown)  



                                                  wks. No FM. No           



                                                  LOF/VB. Rec'd flu           



                                                  shot                

 

           (unknown)  (no       (unknown)  (unknown)  Pt presents for  (units   

 (unknown)



                    date)                         a routine PNV at unknown)  



                                                  28 +6 wks           



                                                  gestation.           



                                                  Abnormal 3 hr           



                                                  GTT.                

 

           (unknown)  (no       (unknown)  (unknown)  Qualifiers:  (units    (un

known)



                    date)                                   unknown)  

 

           (unknown)  (no       (unknown)  (unknown)  Rash or viral  (units    (

unknown)



                    date)                         illness since unknown)  



                                                  last menstrual           



                                                  period: No           

 

           (unknown)  (no       (unknown)  (unknown)  Rash      (units    (unkno

wn)



                    date)                                   unknown)  

 

           (unknown)  (no       (unknown)  (unknown)  Reason For Visit  (units  

  (unknown)



                    date)                                   unknown)  

 

           (unknown)  (no       (unknown)  (unknown)  Recent travel  (units    (

unknown)



                    date)                         outside of unknown)  



                                                  country?: No           

 

           (unknown)  (no       (unknown)  (unknown)  Recurrent  (units    (unkn

own)



                    date)                         pregnancy loss or unknown)  



                                                  a stillbirth: Yes           

 

           (unknown)  (no       (unknown)  (unknown)  Reports over the  (units  

  (unknown)



                    date)                         counter   unknown)  



                                                  medications           



                                                  (diclofenac),           



                                                  Denies alcohol,           



                                                  Denies              

 

           (unknown)  (no       (unknown)  (unknown)  Safety    (units    (unkno

wn)



                    date)                                   unknown)  

 

           (unknown)  (no       (unknown)  (unknown)  Second Trimester  (units  

  (unknown)



                    date)                         Education unknown)  



                                                  Checklist           

 

           (unknown)  (no       (unknown)  (unknown)  Selecting a  (units    (un

known)



                    date)                          care unknown)  



                                                  provider: further           



                                                  discussion needed           

 

           (unknown)  (no       (unknown)  (unknown)  She is at 22  (units    (u

nknown)



                    date)                         weeks 6 days. She unknown)  



                                                  has felt good           



                                                  fetal movement.           



                                                  She says it is           

 

           (unknown)  (no       (unknown)  (unknown)  Shingles  (units    (unkno

wn)



                    date)                                   unknown)  

 

           (unknown)  (no       (unknown)  (unknown)  Signed By:  (units    (unk

nown)



                    date)                         <Electronically unknown)  



                                                  signed by           



                                                  Julieta Au MD>                 

 

           (unknown)  (no       (unknown)  (unknown)  Signed with  (units    (un

known)



                    date)                         Addenda   unknown)  

 

           (unknown)  (no       (unknown)  (unknown)  Signs and  (units    (unkn

own)



                    date)                         symptoms of unknown)  



                                                   labor:           



                                                  discussed           

 

           (unknown)  (no       (unknown)  (unknown)  Smoking Status:  (units   

 (unknown)



                    date)                         Never smoker unknown)  

 

           (unknown)  (no       (unknown)  (unknown)  Social History  (units    

(unknown)



                    date)                                   unknown)  

 

           (unknown)  (no       (unknown)  (unknown)  Status: Acute  (units    (

unknown)



                    date)                                   unknown)  

 

           (unknown)  (no       (unknown)  (unknown)  Support   (units    (unkno

wn)



                    date)                         Person(s):: [place] unknown)  

 

           (unknown)  (no       (unknown)  (unknown)  Surgical History  (units  

  (unknown)



                    date)                         (Updated 09/10/22 unknown)  



                                                  @ 21:46 by Fatoumata Flores)             

 

           (unknown)  (no       (unknown)  (unknown)  Surrogate  (units    (unkn

own)



                    date)                         pregnancy?: no unknown)  

 

           (unknown)  (no       (unknown)  (unknown)  Symptoms since  (units    

(unknown)



                    date)                         LMP: Reports unknown)  



                                                  amenorrhea,           



                                                  nausea, fatigue,           



                                                  breast              



                                                  tenderness,           

 

           (unknown)  (no       (unknown)  (unknown)  Tdap Adult  (units    (unk

nown)



                    date)                         (Adacel) 23 unknown)  



                                                  Z23 - Encounter           



                                                  for immunization           

 

           (unknown)  (no       (unknown)  (unknown)  Teratogen  (units    (unkn

own)



                    date)                         Exposures since unknown)  



                                                  LMP/Conception:           



                                                  Denies              



                                                  prescription           



                                                  medications,           

 

           (unknown)  (no       (unknown)  (unknown)  Testing   (units    (unkno

wn)



                    date)                         Education unknown)  

 

           (unknown)  (no       (unknown)  (unknown)  Testing   (units    (unkno

wn)



                    date)                         education unknown)  



                                                  completed: group           



                                                  B strep, Spina           



                                                  bifida testing           



                                                  and Cell Free           

 

           (unknown)  (no       (unknown)  (unknown)  This note may  (units    (

unknown)



                    date)                         have been all or unknown)  



                                                  partially           



                                                  generated using           



                                                  voice recognition           

 

           (unknown)  (no       (unknown)  (unknown)  Tobacco +  (units    (unkn

own)



                    date)                         Substance Use unknown)  

 

           (unknown)  (no       (unknown)  (unknown)  Tobacco Status  (units    

(unknown)



                    date)                                   unknown)  

 

           (unknown)  (no       (unknown)  (unknown)  Trimester:  (units    (unk

nown)



                    date)                         second trimester unknown)  



                                                  Qualified           



                                                  Code(s): O24.415           



                                                  - Gestational           



                                                  diabetes            

 

           (unknown)  (no       (unknown)  (unknown)  Trimester:: 3rd  (units   

 (unknown)



                    date)                         Trimester unknown)  



                                                  (28wks-Del)           

 

           (unknown)  (no       (unknown)  (unknown)  Tumor (-10/2012)  (units  

  (unknown)



                    date)                                   unknown)  

 

           (unknown)  (no       (unknown)  (unknown)  Type(s) of  (units    (unk

nown)



                    date)                         exercise: unknown)  



                                                  bicycling           



                                                  (stationary           



                                                  bike), regular           



                                                  exercise, weight           

 

           (unknown)  (no       (unknown)  (unknown) UProtein Movement  (units  

  (unknown)



                    date)                         PreLabor FHR Fndl unknown)  



                                                  Ht Pres Edema           



                                                  Cerv Exam           



                                                  US/Comment Next           



                                                  Appt                

 

           (unknown)  (no       (unknown)  (unknown)  Ultrasound  (units    (unk

nown)



                    date)                         performed?: Yes unknown)  

 

           (unknown)  (no       (unknown)  (unknown)  VIS Given Date  (units    

(unknown)



                    date)                         VIS Provided VIS unknown)  



                                                  Publication Date           

 

           (unknown)  (no       (unknown)  (unknown)  Varicella/chicke  (units  

  (unknown)



                    date)                         n pox status: unknown)  



                                                  previous disease           

 

           (unknown)  (no       (unknown)  (unknown)  Visit Date:  (units    (un

known)



                    date)                         23 Last unknown)  



                                                  Updated by:           



                                                  Julieta Au MD                  

 

           (unknown)  (no       (unknown)  (unknown)  Visit Date:  (units    (un

known)



                    date)                         23 Last unknown)  



                                                  Updated by:           



                                                  Julieta Au MD                  

 

           (unknown)  (no       (unknown)  (unknown)  Visit Date:  (units    (un

known)



                    date)                         22 Last unknown)  



                                                  Updated by:           



                                                  Julieta Au MD                  

 

           (unknown)  (no       (unknown)  (unknown)  Visit Date:  (units    (un

known)



                    date)                         10/11/22 Last unknown)  



                                                  Updated by:           



                                                  Fanny Baker MD                  

 

           (unknown)  (no       (unknown)  (unknown)  Visit Date:  (units    (un

known)



                    date)                         22 Last unknown)  



                                                  Updated by:           



                                                  Julieta Au MD                  

 

           (unknown)  (no       (unknown)  (unknown)  Visit Date:  (units    (un

known)



                    date)                         22 Last unknown)  



                                                  Updated by: Berta Salinas P.A-C           

 

           (unknown)  (no       (unknown)  (unknown)  Visit Reasons:  (units    

(unknown)



                    date)                         OB w 3D US unknown)  

 

           (unknown)  (no       (unknown)  (unknown)  Vitals    (units    (unkno

wn)



                    date)                                   unknown)  

 

           (unknown)  (no       (unknown)  (unknown)  Vitamins and  (units    (u

nknown)



                    date)                         iron, Diet and unknown)  



                                                  weight gain, Fish           



                                                  and mercury           



                                                  intake, Caffeine           



                                                  use,                

 

           (unknown)  (no       (unknown)  (unknown)  WG        (units    (unkno

wn)



                    date)                                   unknown)  

 

           (unknown)  (no       (unknown)  (unknown)  Weeks     (units    (unkno

wn)



                    date)                         gestation:: 28 unknown)  

 

           (unknown)  (no       (unknown)  (unknown)  Weight 177 lb  (units    (

unknown)



                    date)                                   unknown)  

 

           (unknown)  (no       (unknown)  (unknown)  [place] teeth  (units    (u

nknown)



                    date)                         extracted unknown)  

 

           (unknown)  (no       (unknown)  (unknown)  Zika virus  (units    (unk

nown)



                    date)                         exposure: No unknown)  

 

           (unknown)  (no       (unknown)  (unknown)  accompanied by  (units    

(unknown)



                    date)                         her . She unknown)  



                                                  went through           



                                                  fertility           



                                                  treatments with           

 

           (unknown)  (no       (unknown)  (unknown)  alcohol intake:  (units   

 (unknown)



                    date)                         never     unknown)  

 

           (unknown)  (no       (unknown)  (unknown)  anterior  (units    (unkno

wn)



                    date)                         placenta. Routine unknown)  



                                                  precautions           



                                                  reviewed with the           



                                                  patient. Due to           



                                                  1st                 

 

           (unknown)  (no       (unknown)  (unknown)  anyone in either  (units  

  (unknown)



                    date)                         family with: unknown)  

 

           (unknown)  (no       (unknown)  (unknown)  baby's father  (units    (

unknown)



                    date)                         had a child with unknown)  



                                                  birth defects not           



                                                  listed above and           



                                                  Denies Other           

 

           (unknown)  (no       (unknown)  (unknown)  back pain.  (units    (unk

nown)



                    date)                         Advised   unknown)  



                                                  stretching, belly           



                                                  band, and PT if           



                                                  not improving.           



                                                  AFP was             

 

           (unknown)  (no       (unknown)  (unknown)  blood sugar  (units    (un

known)



                    date)                         diagnostic (Blood unknown)  



                                                  Glucose Test           



                                                  strips) #100 ea           



                                                  23 [Rx           

 

           (unknown)  (no       (unknown)  (unknown)  blood-glucose  (units    (

unknown)



                    date)                         meter #1 ea unknown)  



                                                  23 [Rx           



                                                  Confirmed           



                                                  23]           

 

           (unknown)  (no       (unknown)  (unknown)  by ultrasound is  (units  

  (unknown)



                    date)                         normal with good unknown)  



                                                  fetal movement,           



                                                  normal appearing           



                                                  fluid,              

 

           (unknown)  (no       (unknown)  (unknown)  caffeine: Yes  (units    (

unknown)



                    date)                         (1-2 cups unknown)  



                                                  tea/day)            

 

           (unknown)  (no       (unknown)  (unknown)  carbon monox  (units    (u

nknown)



                    date)                         detector in home: unknown)  



                                                  Yes                 

 

           (unknown)  (no       (unknown)  (unknown)  cfDNA normal  (units    (u

nknown)



                    date)                         female, AFP unknown)  



                                                  negative            

 

           (unknown)  (no       (unknown)  (unknown)  consistent with  (units   

 (unknown)



                    date)                         9 weeks 0 days. unknown)  



                                                  Yolk sac visible.           



                                                  Fetal heart rate           



                                                  178 beats           

 

           (unknown)  (no       (unknown)  (unknown)  current   (units    (unkno

wn)



                    date)                         occupational unknown)  



                                                  exposures/hazards           



                                                  : No                

 

           (unknown)  (no       (unknown)  (unknown)  daily servings  (units    

(unknown)



                    date)                         fruits/ve-4 unknown)  

 

           (unknown)  (no       (unknown)  (unknown)  described, visit  (units  

  (unknown)



                    date)                         schedule  unknown)  



                                                  reviewed,           



                                                  ultrasounds           



                                                  policy reviewed,           



                                                  coverage 24           

 

           (unknown)  (no       (unknown)  (unknown)  developing a  (units    (u

nknown)



                    date)                         pattern. No unknown)  



                                                  leakage of fluid           



                                                  or vaginal           



                                                  bleeding. No           



                                                  contractions           

 

           (unknown)  (no       (unknown)  (unknown)  discussed,  (units    (unk

nown)



                    date)                         tuberculosis unknown)  



                                                  exposure            



                                                  discussed, CMV           



                                                  discussed,           



                                                  Toxoplasmosis           

 

           (unknown)  (no       (unknown)  (unknown)  disorders,  (units    (unk

nown)



                    date)                         Denies Cystic unknown)  



                                                  Fibrosis, Denies           



                                                  Mental              



                                                  Retardation/Autis           



                                                  m, Denies           

 

           (unknown)  (no       (unknown)  (unknown)  do you feel safe  (units  

  (unknown)



                    date)                         at home: Yes unknown)  

 

           (unknown)  (no       (unknown)  (unknown)  during the past  (units   

 (unknown)



                    date)                         year weight has: unknown)  



                                                  remained stable           

 

           (unknown)  (no       (unknown)  (unknown)  education level:  (units  

  (unknown)



                    date)                         college   unknown)  



                                                  (Associate's           



                                                  degree)             

 

           (unknown)  (no       (unknown)  (unknown)  exposure,  (units    (unkn

own)



                    date)                         Medication use, unknown)  



                                                  Sauna/hot tub           



                                                  use, Dental care,           



                                                  Travel and           



                                                  Influenza           

 

           (unknown)  (no       (unknown)  (unknown)  fire      (units    (unkno

wn)



                    date)                         extinguisher in unknown)  



                                                  home: Yes           

 

           (unknown)  (no       (unknown)  (unknown)  firearms in  (units    (un

known)



                    date)                         home: Yes unknown)  



                                                  firearms unloaded           



                                                  and locked: Yes           

 

           (unknown)  (no       (unknown)  (unknown)  frequency: 5-6  (units    

(unknown)



                    date)                         times per week unknown)  

 

           (unknown)  (no       (unknown)  (unknown)  gestational sac  (units   

 (unknown)



                    date)                         with a fetus with unknown)  



                                                  a crown-rump           



                                                  length measuring           



                                                  2.29 cm             

 

           (unknown)  (no       (unknown)  (unknown)  have occurred.  (units    

(unknown)



                    date)                         If there are any unknown)  



                                                  questions, please           



                                                  contact the           



                                                  Medical Records           

 

           (unknown)  (no       (unknown)  (unknown)  hours a day and  (units   

 (unknown)



                    date)                         participation of unknown)  



                                                  father in           



                                                  prenatal care and           



                                                  office visits           

 

           (unknown)  (no       (unknown)  (unknown)  household  (units    (unkn

own)



                    date)                         members: spouse unknown)  



                                                  and family           



                                                  (father and           



                                                  father-in-law)           

 

           (unknown)  (no       (unknown)  (unknown)  housing: house  (units    

(unknown)



                    date)                                   unknown)  

 

           (unknown)  (no       (unknown)  (unknown)  illicit drugs  (units    (

unknown)



                    date)                         and Denies other unknown)  

 

           (unknown)  (no       (unknown)  (unknown)  kag       (units    (unkno

wn)



                    date)                                   unknown)  

 

           (unknown)  (no       (unknown)  (unknown)  lancets #100 ea  (units   

 (unknown)



                    date)                         23 [Rx unknown)  



                                                  Confirmed           



                                                  23]           

 

           (unknown)  (no       (unknown)  (unknown)  last visit.  (units    (un

known)



                    date)                         Plan: AFP today. unknown)  



                                                  20 wk u/s           



                                                  reviewed. F/U 4           



                                                  wks. Warning           



                                                  signs               

 

           (unknown)  (no       (unknown)  (unknown)  lifting and  (units    (un

known)



                    date)                         other (hiking) unknown)  

 

           (unknown)  (no       (unknown)  (unknown)  lives     (units    (unkno

wn)



                    date)                         independently: unknown)  



                                                  Yes                 

 

           (unknown)  (no       (unknown)  (unknown)  marital status:  (units   

 (unknown)



                    date)                            unknown)  

 

           (unknown)  (no       (unknown)  (unknown)  may occur.  (units    (unk

nown)



                    date)                         Occasional unknown)  



                                                  wrong-word or           



                                                  'sound-alike'           



                                                  substitutions may           



                                                  have                

 

           (unknown)  (no       (unknown)  (unknown)  medication only.  (units  

  (unknown)



                    date)                         Had 7     unknown)  



                                                  miscarriages. No           



                                                  bleeding with           



                                                  this pregnancy.           



                                                  This 1              

 

           (unknown)  (no       (unknown)  (unknown)  mellitus in  (units    (un

known)



                    date)                         pregnancy, unknown)  



                                                  controlled by           



                                                  oral hypoglycemic           



                                                  drugs               

 

           (unknown)  (no       (unknown)  (unknown)  metformin 500 mg  (units  

  (unknown)



                    date)                         PO BID 60 tabs unknown)  



                                                  0RF                 

 

           (unknown)  (no       (unknown)  (unknown)  metformin 500 mg  (units  

  (unknown)



                    date)                         tablet 500 mg PO unknown)  



                                                  BID #60 tabs           



                                                  23 [Rx           



                                                  Confirmed           



                                                  23]           

 

           (unknown)  (no       (unknown)  (unknown)  movement and  (units    (u

nknown)



                    date)                         denies VB, LOF, unknown)  



                                                  cramping and           



                                                  regular             



                                                  contractions. Pt           



                                                  reports low           

 

           (unknown)  (no       (unknown)  (unknown)  negative.  (units    (unkn

own)



                    date)                         Anatomy US unknown)  



                                                  scheduled for           



                                                  later today.           



                                                  Warning             



                                                  precautions           



                                                  reviewed.           

 

           (unknown)  (no       (unknown)  (unknown)  nickel Allergy  (units    

(unknown)



                    date)                         (Mild, Verified unknown)  



                                                  23 13:15)           

 

           (unknown)  (no       (unknown)  (unknown)  number of  (units    (unkn

own)



                    date)                         children: 0 unknown)  

 

           (unknown)  (no       (unknown)  (unknown)  occupational  (units    (u

nknown)



                    date)                         status: employed unknown)  



                                                  (active duty           



                                                  avionics            



                                                  ,           



                                                  [employer] job            

 

           (unknown)  (no       (unknown)  (unknown)  occurred due to  (units   

 (unknown)



                    date)                         the inherent unknown)  



                                                  limitations of           



                                                  voice recognition           



                                                  software. Please           

 

           (unknown)  (no       (unknown)  (unknown)  or cramping.  (units    (u

nknown)



                    date)                         Plan: 1 hour unknown)  



                                                  glucose ordered.           



                                                  Follow-up in 4           



                                                  weeks. Warning           

 

           (unknown)  (no       (unknown)  (unknown)  per minute.  (units    (un

known)



                    date)                         Normal ovaries unknown)  



                                                  bilaterally.           



                                                  Plan: Follow-up           



                                                  in 4 weeks.           



                                                  Warning             

 

           (unknown)  (no       (unknown)  (unknown)  pets and  (units    (unkno

wn)



                    date)                         animals: Yes (1 unknown)  



                                                  large dog,           



                                                  chickens)           

 

           (unknown)  (no       (unknown)  (unknown)  precautions,  (units    (u

nknown)



                    date)                         Listeriosis unknown)  



                                                  prevention and           



                                                  Rubella             



                                                  Immunization           

 

           (unknown)  (no       (unknown)  (unknown)  preeclampsia.  (units    (

unknown)



                    date)                                   unknown)  

 

           (unknown)  (no       (unknown)  (unknown)  pregnancy  (units    (unkn

own)



                    date)                         discussed unknown)  



                                                  starting baby           



                                                  aspirin per day           



                                                  to decrease her           



                                                  risk for            

 

           (unknown)  (no       (unknown)  (unknown)  prenat.vits,nan,  (units  

  (unknown)



                    date)                         min-iron-folic 1 unknown)  



                                                  tab PO DAILY           



                                                  22 [History           



                                                  Confirmed           

 

           (unknown)  (no       (unknown)  (unknown)  read the note  (units    (

unknown)



                    date)                         carefully and unknown)  



                                                  recognize, using           



                                                  context, where           



                                                  these               



                                                  substitutions           

 

           (unknown)  (no       (unknown)  (unknown)  reviewed.  (units    (unkn

own)



                    date)                                   unknown)  

 

           (unknown)  (no       (unknown)  (unknown)  seatbelt use:  (units    (

unknown)



                    date)                         always    unknown)  

 

           (unknown)  (no       (unknown)  (unknown)  second hand  (units    (un

known)



                    date)                         exposure: Yes unknown)  



                                                  (father-in-law           



                                                  smokes outside           



                                                  the house)           

 

           (unknown)  (no       (unknown)  (unknown)  signs reviewed.  (units   

 (unknown)



                    date)                         cfDNA ordered. unknown)  

 

           (unknown)  (no       (unknown)  (unknown)  signs reviewed.  (units   

 (unknown)



                    date)                                   unknown)  

 

           (unknown)  (no       (unknown)  (unknown)  software.  (units    (unkn

own)



                    date)                         Although every unknown)  



                                                  effort is made to           



                                                  edit content,           



                                                  transcription           



                                                  errors              

 

           (unknown)  (no       (unknown)  (unknown)  special madi  (units    (

unknown)



                    date)                         needs: No unknown)  

 

           (unknown)  (no       (unknown)  (unknown)  substance use  (units    (

unknown)



                    date)                         type: does not unknown)  



                                                  use                 

 

           (unknown)  (no       (unknown)  (unknown)  travel history:  (units   

 (unknown)



                    date)                         over 6 months ago unknown)  

 

           (unknown)  (no       (unknown)  (unknown)  urinary   (units    (unkno

wn)



                    date)                         frequency and unknown)  



                                                  irritability           

 

           (unknown)  (no       (unknown)  (unknown)  vaccine (Annual  (units   

 (unknown)



                    date)                         flu, Phizer Covid unknown)  



                                                  x2)                 

 

           (unknown)  (no       (unknown)  (unknown)  w0d 4 wks  (units    (unkn

own)



                    date)                                   unknown)  

 

           (unknown)  (no       (unknown)  (unknown)  was not using  (units    (

unknown)



                    date)                         any infertility unknown)  



                                                  medications.           



                                                  Ultrasound: An           



                                                  intrauterine           

 

           (unknown)  (no       (unknown)  (unknown)  water heater  (units    (u

nknown)



                    date)                         temp set < 120 unknown)  



                                                  deg: Yes            

 

           (unknown)  (no       (unknown)  (unknown)  well-balanced  (units    (

unknown)



                    date)                         diet: daily or unknown)  



                                                  most days           

 

           (unknown)  (no       (unknown)  (unknown)  went away with  (units    

(unknown)



                    date)                         laying down. No unknown)  



                                                  vaginal bleeding.           



                                                  No fevers. Fetal           



                                                  heart tone           

 

           (unknown)  (no       (unknown)  (unknown)  while pregnant)  (units   

 (unknown)



                    date)                                   unknown)  

 

           (unknown)  (no       (unknown)  (unknown)  wks. Warning  (units    (u

nknown)



                    date)                         signs for PTL unknown)  



                                                  reviewed.           

 

           (unknown)  (no       (unknown)  (unknown)  working smoke  (units    (

unknown)



                    date)                         detector in home: unknown)  



                                                  Yes                 









                                         Result panel 19









           (unknown)  (no       (unknown)  (unknown)  (no value)  (units    (unk

nown)



                    date)                                   unknown)  

 

           (unknown)  (no       (unknown)  (unknown)  (+12 lb) 120/58  (units   

 (unknown)



                    date)                         N         unknown)  

 

           (unknown)  (no       (unknown)  (unknown)  (+13 lb) 104/54  (units   

 (unknown)



                    date)                         N         unknown)  

 

           (unknown)  (no       (unknown)  (unknown)  (+18 lb) 122/60  (units   

 (unknown)



                    date)                         N         unknown)  

 

           (unknown)  (no       (unknown)  (unknown)  (+22 lb) 110/62  (units   

 (unknown)



                    date)                         N         unknown)  

 

           (unknown)  (no       (unknown)  (unknown)  (+7 lb) 128/66 N  (units  

  (unknown)



                    date)                                   unknown)  

 

           (unknown)  (no       (unknown)  (unknown)  (+8 lb) 122/68 N  (units  

  (unknown)



                    date)                                   unknown)  

 

           (unknown)  (no       (unknown)  (unknown)  **Genetic  (units    (unkn

own)



                    date)                         Screening/Teratol unknown)  



                                                  ogy Counseling -           



                                                  Includes patient,           



                                                  baby's father, or           

 

           (unknown)  (no       (unknown)  (unknown)  -?-?-?-?-?-?-?-?  (units  

  (unknown)



                    date)                         -?-?-?-?  unknown)  

 

           (unknown)  (no       (unknown)  (unknown)  23  (units    (unkno

wn)



                    date)                                   unknown)  

 

           (unknown)  (no       (unknown)  (unknown)  23  (units    (unkno

wn)



                    date)                                   unknown)  

 

           (unknown)  (no       (unknown)  (unknown)  23  (units    (unkno

wn)



                    date)                                   unknown)  

 

           (unknown)  (no       (unknown)  (unknown)  04/15/23  (units    (unkno

wn)



                    date)                         Ultrasound #1 32w unknown)  



                                                  3d                  

 

           (unknown)  (no       (unknown)  (unknown)  [id number]   (units    (unkno

wn)



                    date)                                   unknown)  

 

           (unknown)  (no       (unknown)  (unknown)  22  (units    (unkno

wn)



                    date)                                   unknown)  

 

           (unknown)  (no       (unknown)  (unknown)  10/11/22  (units    (unkno

wn)



                    date)                                   unknown)  

 

           (unknown)  (no       (unknown)  (unknown)  22  (units    (unkno

wn)



                    date)                                   unknown)  

 

           (unknown)  (no       (unknown)  (unknown)  22  (units    (unkno

wn)



                    date)                                   unknown)  

 

           (unknown)  (no       (unknown)  (unknown)  12w 6d 163 lb  (units    (

unknown)



                    date)                                   unknown)  

 

           (unknown)  (no       (unknown)  (unknown)  16w 6d 167 lb  (units    (

unknown)



                    date)                                   unknown)  

 

           (unknown)  (no       (unknown)  (unknown)  20w 5d 168 lb  (units    (

unknown)



                    date)                                   unknown)  

 

           (unknown)  (no       (unknown)  (unknown)  24w 6d 173 lb  (units    (

unknown)



                    date)                                   unknown)  

 

           (unknown)  (no       (unknown)  (unknown)  28w 6d 177 lb  (units    (

unknown)



                    date)                                   unknown)  

 

           (unknown)  (no       (unknown)  (unknown)  3 wks     (units    (unkno

wn)



                    date)                                   unknown)  

 

           (unknown)  (no       (unknown)  (unknown)  4 wk      (units    (unkno

wn)



                    date)                                   unknown)  

 

           (unknown)  (no       (unknown)  (unknown)  8w 6d 162 lb  (units    (u

nknown)



                    date)                                   unknown)  

 

           (unknown)  (no       (unknown)  (unknown)  Abnormal lab  (units    (u

nknown)



                    date)                         values 1st unknown)  



                                                  trimester:           



                                                  discussed           

 

           (unknown)  (no       (unknown)  (unknown)  Abnormal lab  (units    (u

nknown)



                    date)                         values 2nd unknown)  



                                                  trimester:           



                                                  discussed           

 

           (unknown)  (no       (unknown)  (unknown)  Add'l Plan  (units    (unk

nown)



                    date)                         Details   unknown)  

 

           (unknown)  (no       (unknown)  (unknown)  Age/Sex: 28 / F  (units   

 (unknown)



                    date)                         Date of Service: unknown)  

 

           (unknown)  (no       (unknown)  (unknown)  Allergies  (units    (unkn

own)



                    date)                         (-)   unknown)  

 

           (unknown)  (no       (unknown)  (unknown)  Allergies  (units    (unkn

own)



                    date)                                   unknown)  

 

           (unknown)  (no       (unknown)  (unknown)  SARINA Barker  (units    (

unknown)



                    date)                         [id number]     unknown)  

 

           (unknown)  (no       (unknown)  (unknown)  Anesthesia  (units    (unk

nown)



                    date)                                   unknown)  

 

           (unknown)  (no       (unknown)  (unknown)  Aneuploidy  (units    (unk

nown)



                    date)                         Screening unknown)  



                                                  Offered: Accepted           



                                                  (wants CFDNA)           

 

           (unknown)  (no       (unknown)  (unknown)  Anticipated  (units    (un

known)



                    date)                         course of unknown)  



                                                  prenatal care:           



                                                  discussed           

 

           (unknown)  (no       (unknown)  (unknown)  Anxiety (-2016)  (units   

 (unknown)



                    date)                                   unknown)  

 

           (unknown)  (no       (unknown)  (unknown)  Arrhythmia  (units    (unk

nown)



                    date)                                   unknown)  

 

           (unknown)  (no       (unknown)  (unknown)  Assessment and  (units    

(unknown)



                    date)                         Plan      unknown)  

 

           (unknown)  (no       (unknown)  (unknown)  Attending Dr:  (units    (

unknown)



                    date)                         Julieta Au unknown)  



                                                  MD                  

 

           (unknown)  (no       (unknown)  (unknown)  Libra presents  (units   

 (unknown)



                    date)                         today for routine unknown)  



                                                  OB f/u at 20w5d.           



                                                  She reports good           



                                                  fetal               

 

           (unknown)  (no       (unknown)  (unknown)  Birth     (units    (unkno

wn)



                    date)                         Plan/Preferences unknown)  

 

           (unknown)  (no       (unknown)  (unknown)  Birth Planning  (units    

(unknown)



                    date)                                   unknown)  

 

           (unknown)  (no       (unknown)  (unknown)  Blood     (units    (unkno

wn)



                    date)                         transfusions?: unknown)  



                                                  yes (Never had           



                                                  but would accept)           

 

           (unknown)  (no       (unknown)  (unknown)  Breastfeeding:  (units    

(unknown)



                    date)                         discussed unknown)  

 

           (unknown)  (no       (unknown)  (unknown)  Chicken pox  (units    (un

known)



                    date)                         ()   unknown)  

 

           (unknown)  (no       (unknown)  (unknown)  Childbirth  (units    (unk

nown)



                    date)                         Classes:  unknown)  



                                                  discussed           

 

           (unknown)  (no       (unknown)  (unknown)  Conceived  (units    (unkn

own)



                    date)                         naturally this unknown)  



                                                  pregnancy           

 

           (unknown)  (no       (unknown)  (unknown)  Current Estimate  (units  

  (unknown)



                    date)                         23  unknown)  



                                                  Ultrasound #2 31w           



                                                  6d                  

 

           (unknown)  (no       (unknown)  (unknown)  Current   (units    (unkno

wn)



                    date)                         Pregnancy History unknown)  

 

           (unknown)  (no       (unknown)  (unknown)  DNA       (units    (unkno

wn)



                    date)                                   unknown)  

 

           (unknown)  (no       (unknown)  (unknown)  : 1995  (units   

 (unknown)



                    date)                         Acct:[id number] unknown)  

 

           (unknown)  (no       (unknown)  (unknown)  Date of positive  (units  

  (unknown)



                    date)                         home pregnancy unknown)  



                                                  test: 08/15/22           

 

           (unknown)  (no       (unknown)  (unknown)  Date      (units    (unkno

wn)



                    date)                                   unknown)  

 

           (unknown)  (no       (unknown)  (unknown) Denies Congenital  (units  

  (unknown)



                    date)                         Heart Defect, unknown)  



                                                  Denies Down           



                                                  Syndrome, Denies           



                                                  Muscular            



                                                  Dystrophy,           

 

           (unknown)  (no       (unknown)  (unknown)  Denies Maternal  (units   

 (unknown)



                    date)                         Metabolic unknown)  



                                                  Disorder (EG,TYPE           



                                                  1 Diabetes, PKU),           



                                                  Denies Patient or           

 

           (unknown)  (no       (unknown)  (unknown)  Denies Neural  (units    (

unknown)



                    date)                         Tube Defect unknown)  



                                                  (Meningomyelocele           



                                                  , Spina Bifida,           



                                                  or Anencephaly),           

 

           (unknown)  (no       (unknown)  (unknown)  Denies Sickle  (units    (

unknown)



                    date)                         Cell Disease or unknown)  



                                                  Trait (),           



                                                  Denies Hemophilia           



                                                  or other blood           

 

           (unknown)  (no       (unknown)  (unknown)  Denies [place]  (units  

  (unknown)



                    date)                         (Ashkenazi unknown)  



                                                  [employer], [place],           



                                                  [place]),           



                                                  Denies [place]           

 

           (unknown)  (no       (unknown)  (unknown)  Depression  (units    (unk

nown)



                    date)                         (-2016)   unknown)  

 

           (unknown)  (no       (unknown)  (unknown)  Depression:  (units    (un

known)



                    date)                         discussed unknown)  

 

           (unknown)  (no       (unknown)  (unknown)  Dept at   (units    (unkno

wn)



                    date)                         [phone]. unknown)  

 

           (unknown)  (no       (unknown)  (unknown)  Diet and  (units    (unkno

wn)



                    date)                         Exercise  unknown)  

 

           (unknown)  (no       (unknown)  (unknown)  Disease   (units    (unkno

wn)



                    date)                         (Ashkenazi unknown)  



                                                  Advent), Denies           



                                                  Familial            



                                                  Dysautonomia           



                                                  (Ashkenazi           



                                                  Advent),            

 

           (unknown)  (no       (unknown)  (unknown)  Documented By:  (units    

(unknown)



                    date)                         Julieta Au unknown)  



                                                  MD 23 1521           

 

           (unknown)  (no       (unknown)  (unknown)  [employer]     (units    (unkno

wn)



                    date)                                   unknown)  

 

           (unknown)  (no       (unknown)  (unknown)  MEHNAZ Calculator  (units    

(unknown)



                    date)                                   unknown)  

 

           (unknown)  (no       (unknown)  (unknown)  EGA Weight BP  (units    (

unknown)



                    date)                         UGlucose  unknown)  

 

           (unknown)  (no       (unknown)  (unknown)  Eczema (-2000)  (units    

(unknown)



                    date)                                   unknown)  

 

           (unknown)  (no       (unknown)  (unknown)  Estimated  (units    (unkn

own)



                    date)                         Delivery Date unknown)  



                                                  Method Current           

 

           (unknown)  (no       (unknown)  (unknown)  Exercise and  (units    (u

nknown)



                    date)                         activity, unknown)  



                                                  work/environmenta           



                                                  l/hazards, Sexual           



                                                  activity, X-ray           

 

           (unknown)  (no       (unknown)  (unknown)  F/u in 4 wks.  (units    (

unknown)



                    date)                                   unknown)  

 

           (unknown)  (no       (unknown)  (unknown)  Family History  (units    

(unknown)



                    date)                         (Updated 09/10/22 unknown)  



                                                  @ 21:49 by Fatoumata Flores)             

 

           (unknown)  (no       (unknown)  (unknown)  Family/Other  (units    (u

nknown)



                    date)                         Multiple  unknown)  



                                                  sclerosis           

 

           (unknown)  (no       (unknown)  (unknown)  Father    (units    (unkno

wn)



                    date)                         Hypertension unknown)  

 

           (unknown)  (no       (unknown)  (unknown)  Father of Baby:  (units   

 (unknown)



                    date)                         same      unknown)  

 

           (unknown)  (no       (unknown)  (unknown)  [facility]  (units   

 (unknown)



                    date)                         Associates unknown)  

 

           (unknown)  (no       (unknown)  (unknown)  First Trimester  (units   

 (unknown)



                    date)                         Education unknown)  



                                                  Checklist           

 

           (unknown)  (no       (unknown)  (unknown)  Foot pain  (units    (unkn

own)



                    date)                         (-)   unknown)  

 

           (unknown)  (no       (unknown)  (unknown)   (SAB  (units    (unkn

own)



                    date)                         x7),Fertility Tx, unknown)  



                                                  meds only,           



                                                  started on baby           



                                                  aspirin             



                                                  10/11/2022           

 

           (unknown)  (no       (unknown)  (unknown)  GDM, on   (units    (unkno

wn)



                    date)                         Metformin 500mg unknown)  



                                                  BID                 

 

           (unknown)  (no       (unknown)  (unknown)  Genetic   (units    (unkno

wn)



                    date)                         Screening + unknown)  



                                                  Counseling           

 

           (unknown)  (no       (unknown)  (unknown)  Genetic   (units    (unkno

wn)



                    date)                         Screening unknown)  

 

           (unknown)  (no       (unknown)  (unknown)  Grandfather  (units    (un

known)



                    date)                          Cancer unknown)  

 

           (unknown)  (no       (unknown)  (unknown)  Grandfather  (units    (un

known)



                    date)                          Stroke unknown)  

 

           (unknown)  (no       (unknown)  (unknown)  Grandmother  (units    (un

known)



                    date)                          History unknown)  



                                                  of heart disease           

 

           (unknown)  (no       (unknown)  (unknown)  Grandmother  (units    (un

known)



                    date)                          Multiple unknown)  



                                                  sclerosis           

 

           (unknown)  (no       (unknown)  (unknown)   8  (units    (unkn

own)



                    date)                         Multiple births 0 unknown)  

 

           (unknown)  (no       (unknown)  (unknown)  HIV risk  (units    (unkno

wn)



                    date)                         evaluation: low unknown)  



                                                  risk                

 

           (unknown)  (no       (unknown)  (unknown)  Health Center  (units    (

unknown)



                    date)                         Education unknown)  

 

           (unknown)  (no       (unknown)  (unknown)  Health center  (units    (

unknown)



                    date)                         information: unknown)  



                                                  nature of           



                                                  practice            



                                                  discussed,           



                                                  prenatal            



                                                  personnel           

 

           (unknown)  (no       (unknown)  (unknown)  Hepatitis C risk  (units  

  (unknown)



                    date)                         evaluation: low unknown)  



                                                  risk                

 

           (unknown)  (no       (unknown)  (unknown)  History of  (units    (unk

nown)



                    date)                         Hepatitis B: No unknown)  

 

           (unknown)  (no       (unknown)  (unknown)  History of  (units    (unk

nown)



                    date)                         Hepatitis C: No unknown)  

 

           (unknown)  (no       (unknown)  (unknown)  History of  (units    (unk

nown)



                    date)                         recurrent unknown)  



                                                  miscarriages           

 

           (unknown)  (no       (unknown)  (unknown)  [facility]  (units    (u

nknown)



                    date)                                   unknown)  

 

           (unknown)  (no       (unknown)  (unknown)  [facility]  (units    (u

nknown)



                    date)                         Chorea, Denies unknown)  



                                                  Other inherited           



                                                  genetic or           



                                                  chromosomal           



                                                  disorder,           

 

           (unknown)  (no       (unknown)  (unknown)  , [place]  (units    (

unknown)



                    date)                         [place]  unknown)  

 

           (unknown)  (no       (unknown)  (unknown)  Hx #   (units    (u

nknown)



                    date)                         Pregnancies 0 unknown)  



                                                  Elective            



                                                  abortions 0           

 

           (unknown)  (no       (unknown)  (unknown)  Hx # Term  (units    (unkn

own)



                    date)                         Pregnancies 0 unknown)  



                                                  Ectopic             



                                                  pregnancies 0           

 

           (unknown)  (no       (unknown)  (unknown)  Infant will be  (units    

(unknown)



                    date)                         adopted?: no unknown)  

 

           (unknown)  (no       (unknown)  (unknown)  Infection  (units    (unkn

own)



                    date)                         History   unknown)  

 

           (unknown)  (no       (unknown)  (unknown)  Infectious  (units    (unk

nown)



                    date)                         Disease Education unknown)  

 

           (unknown)  (no       (unknown)  (unknown)  Infectious  (units    (unk

nown)



                    date)                         disease exposure: unknown)  



                                                  chicken pox           



                                                  immunity            



                                                  discussed,           



                                                  hepatitis risk           

 

           (unknown)  (no       (unknown)  (unknown)  Infertility  (units    (un

known)



                    date)                         ()   unknown)  

 

           (unknown)  (no       (unknown)  (unknown)  Initial Weight:  (units   

 (unknown)



                    date)                         155 lb    unknown)  

 

           (unknown)  (no       (unknown)  (unknown)  Initials  (units    (unkno

wn)



                    date)                                   unknown)  

 

           (unknown)  (no       (unknown)  (unknown)  Intake    (units    (unkno

wn)



                    date)                                   unknown)  

 

           (unknown)  (no       (unknown)  (unknown)  Irregular  (units    (unkn

own)



                    date)                         menstrual cycle unknown)  



                                                  (-)             

 

           (unknown)  (no       (unknown)  (unknown)  LM        (units    (unkno

wn)



                    date)                                   unknown)  

 

           (unknown)  (no       (unknown)  (unknown)  Lipoma    (units    (unkno

wn)



                    date)                                   unknown)  

 

           (unknown)  (no       (unknown)  (unknown)  Live with  (units    (unkn

own)



                    date)                         someone with TB unknown)  



                                                  or exposed to TB:           



                                                  No                  

 

           (unknown)  (no       (unknown)  (unknown)  Loc: [facility]  (units    (unkno

wn)



                    date)                                   unknown)  

 

           (unknown)  (no       (unknown)  (unknown)  Marital status:  (units   

 (unknown)



                    date)                            unknown)  

 

           (unknown)  (no       (unknown)  (unknown)  Medical History  (units   

 (unknown)



                    date)                         (Updated 23 unknown)  



                                                  @ 16:18 by           



                                                  Julieta Au MD)                 

 

           (unknown)  (no       (unknown)  (unknown)  Mother Gastric  (units    

(unknown)



                    date)                         cancer    unknown)  

 

           (unknown)  (no       (unknown)  (unknown)  N No 142 13 N/A  (units   

 (unknown)



                    date)                         4wk       unknown)  

 

           (unknown)  (no       (unknown)  (unknown)  N No no 143 17  (units    

(unknown)



                    date)                         N/A absent AFP 4 unknown)  



                                                  wks                 

 

           (unknown)  (no       (unknown)  (unknown)  N No no 178 9  (units    (

unknown)



                    date)                         N/A absent unknown)  



                                                  long/closed AGA 9           

 

           (unknown)  (no       (unknown)  (unknown)  N Yes no 141 24  (units   

 (unknown)



                    date)                         N/A absent 4 wks unknown)  

 

           (unknown)  (no       (unknown)  (unknown)  N Yes no 142 20  (units   

 (unknown)



                    date)                         N/A absent AFP unknown)  



                                                  negative            

 

           (unknown)  (no       (unknown)  (unknown)  N Yes no 144 29  (units   

 (unknown)



                    date)                         Vertex absent AGA unknown)  



                                                  29w5d               

 

           (unknown)  (no       (unknown)  (unknown)  NF        (units    (unkno

wn)



                    date)                                   unknown)  

 

           (unknown)  (no       (unknown)  (unknown)  Notes     (units    (unkno

wn)



                    date)                                   unknown)  

 

           (unknown)  (no       (unknown)  (unknown)  Number of Living  (units  

  (unknown)



                    date)                         Children 0 unknown)  

 

           (unknown)  (no       (unknown)  (unknown)  Number of  (units    (unkn

own)



                    date)                         fetuses:: Single unknown)  

 

           (unknown)  (no       (unknown)  (unknown)  Nutrition and  (units    (

unknown)



                    date)                         weight gain unknown)  



                                                  counseling:           



                                                  special diet:           



                                                  discussed           

 

           (unknown)  (no       (unknown)  (unknown)  OB Office Visit  (units   

 (unknown)



                    date)                                   unknown)  

 

           (unknown)  (no       (unknown)  (unknown)  OB Visit Log  (units    (u

nknown)



                    date)                                   unknown)  

 

           (unknown)  (no       (unknown)  (unknown)  On U/S: AGA  (units    (un

known)



                    date)                         29w5d. 3#4oz unknown)  



                                                  66%ile. Nl AFV.           



                                                  Plan: Metformin           



                                                  500mg BID. F/U 3           

 

           (unknown)  (no       (unknown)  (unknown)  On birth control  (units  

  (unknown)



                    date)                         at conception?: unknown)  



                                                  No                  

 

           (unknown)  (no       (unknown)  (unknown)  Other Estimates  (units   

 (unknown)



                    date)                         23 LMP unknown)  



                                                  (Certain) 34w 0d           

 

           (unknown)  (no       (unknown)  (unknown)  Ovarian cyst  (units    (u

nknown)



                    date)                         ()   unknown)  

 

           (unknown)  (no       (unknown)  (unknown)  PCOS (polycystic  (units  

  (unknown)



                    date)                         ovarian syndrome) unknown)  

 

           (unknown)  (no       (unknown)  (unknown)  PFSH      (units    (unkno

wn)



                    date)                                   unknown)  

 

           (unknown)  (no       (unknown)  (unknown)  Para 0    (units    (unkno

wn)



                    date)                         Spontaneous unknown)  



                                                  abortions 7           

 

           (unknown)  (no       (unknown)  (unknown)  Partner history  (units   

 (unknown)



                    date)                         of STD: denies hx unknown)  

 

           (unknown)  (no       (unknown)  (unknown)  Partner history  (units   

 (unknown)



                    date)                         of genital unknown)  



                                                  herpes: No           

 

           (unknown)  (no       (unknown)  (unknown)  Partner: [place]  (units    (

unknown)



                    date)                         [place]  unknown)  

 

           (unknown)  (no       (unknown)  (unknown)  Patient comes in  (units  

  (unknown)



                    date)                         for follow-up OB unknown)  



                                                  visit. She had           



                                                  some pelvic pain           



                                                  that                

 

           (unknown)  (no       (unknown)  (unknown)  Patient presents  (units  

  (unknown)



                    date)                         for a new OB unknown)  



                                                  visit at 8 weeks           



                                                  gestation. She is           

 

           (unknown)  (no       (unknown)  (unknown)  Patient presents  (units  

  (unknown)



                    date)                         for a routine unknown)  



                                                  prenatal visit,           



                                                  accompanied by           



                                                  her .           

 

           (unknown)  (no       (unknown)  (unknown)  Patient's age 33  (units  

  (unknown)



                    date)                         years or older as unknown)  



                                                  of estimated date           



                                                  of delivery: No           

 

           (unknown)  (no       (unknown)  (unknown)  Patient:  (units    (unkno

wn)



                    date)                         Libra Prieto unknown)  



                                                  MR#: [id number]            

 

           (unknown)  (no       (unknown)  (unknown)  Pediatrician:  (units    (

unknown)



                    date)                         DOD [place] vs unknown)  



                                                  [facility]           



                                                  [facility]           



                                                  [place]             

 

           (unknown)  (no       (unknown)  (unknown)  Personal history  (units  

  (unknown)



                    date)                         of STD: denies hx unknown)  

 

           (unknown)  (no       (unknown)  (unknown)  Personal history  (units  

  (unknown)



                    date)                         of genital unknown)  



                                                  herpes: No           

 

           (unknown)  (no       (unknown)  (unknown)  Plantar   (units    (unkno

wn)



                    date)                         fasciitis unknown)  

 

           (unknown)  (no       (unknown)  (unknown)  Postpartum  (units    (unk

nown)



                    date)                         family    unknown)  



                                                  planning/Tubal           



                                                  sterilization:           



                                                  further             



                                                  discussion needed           

 

           (unknown)  (no       (unknown)  (unknown)  Pregnancy  (units    (unkn

own)



                    date)                         History   unknown)  

 

           (unknown)  (no       (unknown)  (unknown)  Pregnancy type::  (units  

  (unknown)



                    date)                         Other Normal unknown)  



                                                  Pregnancy           

 

           (unknown)  (no       (unknown)  (unknown)  Prenatal  (units    (unkno

wn)



                    date)                         Education unknown)  

 

           (unknown)  (no       (unknown)  (unknown)  Prenatal Initial  (units  

  (unknown)



                    date)                         Assessment unknown)  

 

           (unknown)  (no       (unknown)  (unknown)  Prenatal  (units    (unkno

wn)



                    date)                         Specific  unknown)  



                                                  Issues/Plans           

 

           (unknown)  (no       (unknown)  (unknown)  Prenatal  (units    (unkno

wn)



                    date)                         Testing:  unknown)  



                                                  discussed           

 

           (unknown)  (no       (unknown)  (unknown)  Prenatal Visit  (units    

(unknown)



                    date)                                   unknown)  

 

           (unknown)  (no       (unknown)  (unknown)  Prenatal  (units    (unkno

wn)



                    date)                         education packet: unknown)  



                                                  Child birth           



                                                  education/plan,           



                                                  Pregnancy           



                                                  symptoms,           

 

           (unknown)  (no       (unknown)  (unknown)  Primary Care  (units    (u

nknown)



                    date)                         Provider: DOD [place] unknown)  



                                                  [place]              

 

           (unknown)  (no       (unknown)  (unknown)  Primary Ob  (units    (unk

nown)



                    date)                         Provider: unknown)  



                                                  Julieta Au           

 

           (unknown)  (no       (unknown)  (unknown)  Prior     (units    (unkno

wn)



                    date)                         GBS-Infected unknown)  



                                                  child: No           

 

           (unknown)  (no       (unknown)  (unknown)  Providers  (units    (unkn

own)



                    date)                                   unknown)  

 

           (unknown)  (no       (unknown)  (unknown)  Pt presents for  (units   

 (unknown)



                    date)                         a PNV at 16+6 unknown)  



                                                  wks. No FM. No           



                                                  LOF/VB. Rec'd flu           



                                                  shot                

 

           (unknown)  (no       (unknown)  (unknown)  Pt presents for  (units   

 (unknown)



                    date)                         a routine PNV at unknown)  



                                                  28 +6 wks           



                                                  gestation.           



                                                  Abnormal 3 hr           



                                                  GTT.                

 

           (unknown)  (no       (unknown)  (unknown)  Rash or viral  (units    (

unknown)



                    date)                         illness since unknown)  



                                                  last menstrual           



                                                  period: No           

 

           (unknown)  (no       (unknown)  (unknown)  Rash      (units    (unkno

wn)



                    date)                                   unknown)  

 

           (unknown)  (no       (unknown)  (unknown)  Reason For Visit  (units  

  (unknown)



                    date)                                   unknown)  

 

           (unknown)  (no       (unknown)  (unknown)  Recent travel  (units    (

unknown)



                    date)                         outside of unknown)  



                                                  country?: No           

 

           (unknown)  (no       (unknown)  (unknown)  Recurrent  (units    (unkn

own)



                    date)                         pregnancy loss or unknown)  



                                                  a stillbirth: Yes           

 

           (unknown)  (no       (unknown)  (unknown)  Reports over the  (units  

  (unknown)



                    date)                         counter   unknown)  



                                                  medications           



                                                  (diclofenac),           



                                                  Denies alcohol,           



                                                  Denies              

 

           (unknown)  (no       (unknown)  (unknown)  Safety    (units    (unkno

wn)



                    date)                                   unknown)  

 

           (unknown)  (no       (unknown)  (unknown)  Second Trimester  (units  

  (unknown)



                    date)                         Education unknown)  



                                                  Checklist           

 

           (unknown)  (no       (unknown)  (unknown)  Selecting a  (units    (un

known)



                    date)                          care unknown)  



                                                  provider: further           



                                                  discussion needed           

 

           (unknown)  (no       (unknown)  (unknown)  She is at 22  (units    (u

nknown)



                    date)                         weeks 6 days. She unknown)  



                                                  has felt good           



                                                  fetal movement.           



                                                  She says it is           

 

           (unknown)  (no       (unknown)  (unknown)  Shingles  (units    (unkno

wn)



                    date)                                   unknown)  

 

           (unknown)  (no       (unknown)  (unknown)  Signed By:  (units    (unk

nown)



                    date)                                   unknown)  

 

           (unknown)  (no       (unknown)  (unknown)  Signs and  (units    (unkn

own)



                    date)                         symptoms of unknown)  



                                                   labor:           



                                                  discussed           

 

           (unknown)  (no       (unknown)  (unknown)  Smoking Status:  (units   

 (unknown)



                    date)                         Never smoker unknown)  

 

           (unknown)  (no       (unknown)  (unknown)  Social History  (units    

(unknown)



                    date)                                   unknown)  

 

           (unknown)  (no       (unknown)  (unknown)  Support   (units    (unkno

wn)



                    date)                         Person(s):: [place] unknown)  

 

           (unknown)  (no       (unknown)  (unknown)  Surgical History  (units  

  (unknown)



                    date)                         (Updated 09/10/22 unknown)  



                                                  @ 21:46 by Fatoumata Flores)             

 

           (unknown)  (no       (unknown)  (unknown)  Surrogate  (units    (unkn

own)



                    date)                         pregnancy?: no unknown)  

 

           (unknown)  (no       (unknown)  (unknown)  Symptoms since  (units    

(unknown)



                    date)                         LMP: Reports unknown)  



                                                  amenorrhea,           



                                                  nausea, fatigue,           



                                                  breast              



                                                  tenderness,           

 

           (unknown)  (no       (unknown)  (unknown)  Teratogen  (units    (unkn

own)



                    date)                         Exposures since unknown)  



                                                  LMP/Conception:           



                                                  Denies              



                                                  prescription           



                                                  medications,           

 

           (unknown)  (no       (unknown)  (unknown)  Testing   (units    (unkno

wn)



                    date)                         Education unknown)  

 

           (unknown)  (no       (unknown)  (unknown)  Testing   (units    (unkno

wn)



                    date)                         education unknown)  



                                                  completed: group           



                                                  B strep, Spina           



                                                  bifida testing           



                                                  and Cell Free           

 

           (unknown)  (no       (unknown)  (unknown)  This note may  (units    (

unknown)



                    date)                         have been all or unknown)  



                                                  partially           



                                                  generated using           



                                                  voice recognition           

 

           (unknown)  (no       (unknown)  (unknown)  Tobacco +  (units    (unkn

own)



                    date)                         Substance Use unknown)  

 

           (unknown)  (no       (unknown)  (unknown)  Tobacco Status  (units    

(unknown)



                    date)                                   unknown)  

 

           (unknown)  (no       (unknown)  (unknown)  Tumor (-10/2012)  (units  

  (unknown)



                    date)                                   unknown)  

 

           (unknown)  (no       (unknown)  (unknown)  Type(s) of  (units    (unk

nown)



                    date)                         exercise: unknown)  



                                                  bicycling           



                                                  (stationary           



                                                  bike), regular           



                                                  exercise, weight           

 

           (unknown)  (no       (unknown)  (unknown) UProtein Movement  (units  

  (unknown)



                    date)                         PreLabor FHR Fndl unknown)  



                                                  Ht Pres Edema           



                                                  Cerv Exam           



                                                  US/Comment Next           



                                                  Appt                

 

           (unknown)  (no       (unknown)  (unknown)  Varicella/chicke  (units  

  (unknown)



                    date)                         n pox status: unknown)  



                                                  previous disease           

 

           (unknown)  (no       (unknown)  (unknown)  Visit Date:  (units    (un

known)



                    date)                         23 Last unknown)  



                                                  Updated by:           



                                                  Julieta Au MD                  

 

           (unknown)  (no       (unknown)  (unknown)  Visit Date:  (units    (un

known)



                    date)                         23 Last unknown)  



                                                  Updated by:           



                                                  Julieta Au MD                  

 

           (unknown)  (no       (unknown)  (unknown)  Visit Date:  (units    (un

known)



                    date)                         22 Last unknown)  



                                                  Updated by:           



                                                  Julieta Au MD                  

 

           (unknown)  (no       (unknown)  (unknown)  Visit Date:  (units    (un

known)



                    date)                         10/11/22 Last unknown)  



                                                  Updated by:           



                                                  Fanny Baker MD                  

 

           (unknown)  (no       (unknown)  (unknown)  Visit Date:  (units    (un

known)



                    date)                         22 Last unknown)  



                                                  Updated by:           



                                                  Julieta Au MD                  

 

           (unknown)  (no       (unknown)  (unknown)  Visit Date:  (units    (un

known)



                    date)                         22 Last unknown)  



                                                  Updated by: Berta Salinas P.A-C           

 

           (unknown)  (no       (unknown)  (unknown)  Visit Reasons:  (units    

(unknown)



                    date)                         OB        unknown)  

 

           (unknown)  (no       (unknown)  (unknown)  Vitamins and  (units    (u

nknown)



                    date)                         iron, Diet and unknown)  



                                                  weight gain, Fish           



                                                  and mercury           



                                                  intake, Caffeine           



                                                  use,                

 

           (unknown)  (no       (unknown)  (unknown)  WG        (units    (unkno

wn)



                    date)                                   unknown)  

 

           (unknown)  (no       (unknown)  (unknown)  [place] teeth  (units    (u

nknown)



                    date)                         extracted unknown)  

 

           (unknown)  (no       (unknown)  (unknown)  Zika virus  (units    (unk

nown)



                    date)                         exposure: No unknown)  

 

           (unknown)  (no       (unknown)  (unknown)  accompanied by  (units    

(unknown)



                    date)                         her . She unknown)  



                                                  went through           



                                                  fertility           



                                                  treatments with           

 

           (unknown)  (no       (unknown)  (unknown)  alcohol intake:  (units   

 (unknown)



                    date)                         never     unknown)  

 

           (unknown)  (no       (unknown)  (unknown)  anterior  (units    (unkno

wn)



                    date)                         placenta. Routine unknown)  



                                                  precautions           



                                                  reviewed with the           



                                                  patient. Due to           



                                                  1st                 

 

           (unknown)  (no       (unknown)  (unknown)  anyone in either  (units  

  (unknown)



                    date)                         family with: unknown)  

 

           (unknown)  (no       (unknown)  (unknown)  baby's father  (units    (

unknown)



                    date)                         had a child with unknown)  



                                                  birth defects not           



                                                  listed above and           



                                                  Denies Other           

 

           (unknown)  (no       (unknown)  (unknown)  back pain.  (units    (unk

nown)



                    date)                         Advised   unknown)  



                                                  stretching, belly           



                                                  band, and PT if           



                                                  not improving.           



                                                  AFP was             

 

           (unknown)  (no       (unknown)  (unknown)  by ultrasound is  (units  

  (unknown)



                    date)                         normal with good unknown)  



                                                  fetal movement,           



                                                  normal appearing           



                                                  fluid,              

 

           (unknown)  (no       (unknown)  (unknown)  caffeine: Yes  (units    (

unknown)



                    date)                         (1-2 cups unknown)  



                                                  tea/day)            

 

           (unknown)  (no       (unknown)  (unknown)  carbon monox  (units    (u

nknown)



                    date)                         detector in home: unknown)  



                                                  Yes                 

 

           (unknown)  (no       (unknown)  (unknown)  cfDNA normal  (units    (u

nknown)



                    date)                         female, AFP unknown)  



                                                  negative            

 

           (unknown)  (no       (unknown)  (unknown)  consistent with  (units   

 (unknown)



                    date)                         9 weeks 0 days. unknown)  



                                                  Yolk sac visible.           



                                                  Fetal heart rate           



                                                  178 beats           

 

           (unknown)  (no       (unknown)  (unknown)  current   (units    (unkno

wn)



                    date)                         occupational unknown)  



                                                  exposures/hazards           



                                                  : No                

 

           (unknown)  (no       (unknown)  (unknown)  daily servings  (units    

(unknown)



                    date)                         fruits/ve-4 unknown)  

 

           (unknown)  (no       (unknown)  (unknown)  described, visit  (units  

  (unknown)



                    date)                         schedule  unknown)  



                                                  reviewed,           



                                                  ultrasounds           



                                                  policy reviewed,           



                                                  coverage 24           

 

           (unknown)  (no       (unknown)  (unknown)  developing a  (units    (u

nknown)



                    date)                         pattern. No unknown)  



                                                  leakage of fluid           



                                                  or vaginal           



                                                  bleeding. No           



                                                  contractions           

 

           (unknown)  (no       (unknown)  (unknown)  discussed,  (units    (unk

nown)



                    date)                         tuberculosis unknown)  



                                                  exposure            



                                                  discussed, CMV           



                                                  discussed,           



                                                  Toxoplasmosis           

 

           (unknown)  (no       (unknown)  (unknown)  disorders,  (units    (unk

nown)



                    date)                         Denies Cystic unknown)  



                                                  Fibrosis, Denies           



                                                  Mental              



                                                  Retardation/Autis           



                                                  m, Denies           

 

           (unknown)  (no       (unknown)  (unknown)  do you feel safe  (units  

  (unknown)



                    date)                         at home: Yes unknown)  

 

           (unknown)  (no       (unknown)  (unknown)  during the past  (units   

 (unknown)



                    date)                         year weight has: unknown)  



                                                  remained stable           

 

           (unknown)  (no       (unknown)  (unknown)  education level:  (units  

  (unknown)



                    date)                         college   unknown)  



                                                  (Associate's           



                                                  degree)             

 

           (unknown)  (no       (unknown)  (unknown)  exposure,  (units    (unkn

own)



                    date)                         Medication use, unknown)  



                                                  Sauna/hot tub           



                                                  use, Dental care,           



                                                  Travel and           



                                                  Influenza           

 

           (unknown)  (no       (unknown)  (unknown)  fire      (units    (unkno

wn)



                    date)                         extinguisher in unknown)  



                                                  home: Yes           

 

           (unknown)  (no       (unknown)  (unknown)  firearms in  (units    (un

known)



                    date)                         home: Yes unknown)  



                                                  firearms unloaded           



                                                  and locked: Yes           

 

           (unknown)  (no       (unknown)  (unknown)  frequency: 5-6  (units    

(unknown)



                    date)                         times per week unknown)  

 

           (unknown)  (no       (unknown)  (unknown)  gestational sac  (units   

 (unknown)



                    date)                         with a fetus with unknown)  



                                                  a crown-rump           



                                                  length measuring           



                                                  2.29 cm             

 

           (unknown)  (no       (unknown)  (unknown)  have occurred.  (units    

(unknown)



                    date)                         If there are any unknown)  



                                                  questions, please           



                                                  contact the           



                                                  Medical Records           

 

           (unknown)  (no       (unknown)  (unknown)  hours a day and  (units   

 (unknown)



                    date)                         participation of unknown)  



                                                  father in           



                                                  prenatal care and           



                                                  office visits           

 

           (unknown)  (no       (unknown)  (unknown)  household  (units    (unkn

own)



                    date)                         members: spouse unknown)  



                                                  and family           



                                                  (father and           



                                                  father-in-law)           

 

           (unknown)  (no       (unknown)  (unknown)  housing: house  (units    

(unknown)



                    date)                                   unknown)  

 

           (unknown)  (no       (unknown)  (unknown)  illicit drugs  (units    (

unknown)



                    date)                         and Denies other unknown)  

 

           (unknown)  (no       (unknown)  (unknown)  kag       (units    (unkno

wn)



                    date)                                   unknown)  

 

           (unknown)  (no       (unknown)  (unknown)  last visit.  (units    (un

known)



                    date)                         Plan: AFP today. unknown)  



                                                  20 wk u/s           



                                                  reviewed. F/U 4           



                                                  wks. Warning           



                                                  signs               

 

           (unknown)  (no       (unknown)  (unknown)  lifting and  (units    (un

known)



                    date)                         other (hiking) unknown)  

 

           (unknown)  (no       (unknown)  (unknown)  lives     (units    (unkno

wn)



                    date)                         independently: unknown)  



                                                  Yes                 

 

           (unknown)  (no       (unknown)  (unknown)  marital status:  (units   

 (unknown)



                    date)                            unknown)  

 

           (unknown)  (no       (unknown)  (unknown)  may occur.  (units    (unk

nown)



                    date)                         Occasional unknown)  



                                                  wrong-word or           



                                                  'sound-alike'           



                                                  substitutions may           



                                                  have                

 

           (unknown)  (no       (unknown)  (unknown)  medication only.  (units  

  (unknown)



                    date)                         Had 7     unknown)  



                                                  miscarriages. No           



                                                  bleeding with           



                                                  this pregnancy.           



                                                  This 1              

 

           (unknown)  (no       (unknown)  (unknown)  movement and  (units    (u

nknown)



                    date)                         denies VB, LOF, unknown)  



                                                  cramping and           



                                                  regular             



                                                  contractions. Pt           



                                                  reports low           

 

           (unknown)  (no       (unknown)  (unknown)  negative.  (units    (unkn

own)



                    date)                         Anatomy US unknown)  



                                                  scheduled for           



                                                  later today.           



                                                  Warning             



                                                  precautions           



                                                  reviewed.           

 

           (unknown)  (no       (unknown)  (unknown)  nickel Allergy  (units    

(unknown)



                    date)                         (Mild, Verified unknown)  



                                                  23 13:15)           

 

           (unknown)  (no       (unknown)  (unknown)  number of  (units    (unkn

own)



                    date)                         children: 0 unknown)  

 

           (unknown)  (no       (unknown)  (unknown)  occupational  (units    (u

nknown)



                    date)                         status: employed unknown)  



                                                  (active duty           



                                                  avionics            



                                                  ,           



                                                  [employer] job            

 

           (unknown)  (no       (unknown)  (unknown)  occurred due to  (units   

 (unknown)



                    date)                         the inherent unknown)  



                                                  limitations of           



                                                  voice recognition           



                                                  software. Please           

 

           (unknown)  (no       (unknown)  (unknown)  or cramping.  (units    (u

nknown)



                    date)                         Plan: 1 hour unknown)  



                                                  glucose ordered.           



                                                  Follow-up in 4           



                                                  weeks. Warning           

 

           (unknown)  (no       (unknown)  (unknown)  per minute.  (units    (un

known)



                    date)                         Normal ovaries unknown)  



                                                  bilaterally.           



                                                  Plan: Follow-up           



                                                  in 4 weeks.           



                                                  Warning             

 

           (unknown)  (no       (unknown)  (unknown)  pets and  (units    (unkno

wn)



                    date)                         animals: Yes (1 unknown)  



                                                  large dog,           



                                                  chickens)           

 

           (unknown)  (no       (unknown)  (unknown)  precautions,  (units    (u

nknown)



                    date)                         Listeriosis unknown)  



                                                  prevention and           



                                                  Rubella             



                                                  Immunization           

 

           (unknown)  (no       (unknown)  (unknown)  preeclampsia.  (units    (

unknown)



                    date)                                   unknown)  

 

           (unknown)  (no       (unknown)  (unknown)  pregnancy  (units    (unkn

own)



                    date)                         discussed unknown)  



                                                  starting baby           



                                                  aspirin per day           



                                                  to decrease her           



                                                  risk for            

 

           (unknown)  (no       (unknown)  (unknown)  read the note  (units    (

unknown)



                    date)                         carefully and unknown)  



                                                  recognize, using           



                                                  context, where           



                                                  these               



                                                  substitutions           

 

           (unknown)  (no       (unknown)  (unknown)  reviewed.  (units    (unkn

own)



                    date)                                   unknown)  

 

           (unknown)  (no       (unknown)  (unknown)  seatbelt use:  (units    (

unknown)



                    date)                         always    unknown)  

 

           (unknown)  (no       (unknown)  (unknown)  second hand  (units    (un

known)



                    date)                         exposure: Yes unknown)  



                                                  (father-in-law           



                                                  smokes outside           



                                                  the house)           

 

           (unknown)  (no       (unknown)  (unknown)  signs reviewed.  (units   

 (unknown)



                    date)                         cfDNA ordered. unknown)  

 

           (unknown)  (no       (unknown)  (unknown)  signs reviewed.  (units   

 (unknown)



                    date)                                   unknown)  

 

           (unknown)  (no       (unknown)  (unknown)  software.  (units    (unkn

own)



                    date)                         Although every unknown)  



                                                  effort is made to           



                                                  edit content,           



                                                  transcription           



                                                  errors              

 

           (unknown)  (no       (unknown)  (unknown)  special madi  (units    (

unknown)



                    date)                         needs: No unknown)  

 

           (unknown)  (no       (unknown)  (unknown)  substance use  (units    (

unknown)



                    date)                         type: does not unknown)  



                                                  use                 

 

           (unknown)  (no       (unknown)  (unknown)  travel history:  (units   

 (unknown)



                    date)                         over 6 months ago unknown)  

 

           (unknown)  (no       (unknown)  (unknown)  urinary   (units    (unkno

wn)



                    date)                         frequency and unknown)  



                                                  irritability           

 

           (unknown)  (no       (unknown)  (unknown)  vaccine (Annual  (units   

 (unknown)



                    date)                         flu, Phizer Covid unknown)  



                                                  x2)                 

 

           (unknown)  (no       (unknown)  (unknown)  w0d 4 wks  (units    (unkn

own)



                    date)                                   unknown)  

 

           (unknown)  (no       (unknown)  (unknown)  was not using  (units    (

unknown)



                    date)                         any infertility unknown)  



                                                  medications.           



                                                  Ultrasound: An           



                                                  intrauterine           

 

           (unknown)  (no       (unknown)  (unknown)  water heater  (units    (u

nknown)



                    date)                         temp set < 120 unknown)  



                                                  deg: Yes            

 

           (unknown)  (no       (unknown)  (unknown)  well-balanced  (units    (

unknown)



                    date)                         diet: daily or unknown)  



                                                  most days           

 

           (unknown)  (no       (unknown)  (unknown)  went away with  (units    

(unknown)



                    date)                         laying down. No unknown)  



                                                  vaginal bleeding.           



                                                  No fevers. Fetal           



                                                  heart tone           

 

           (unknown)  (no       (unknown)  (unknown)  while pregnant)  (units   

 (unknown)



                    date)                                   unknown)  

 

           (unknown)  (no       (unknown)  (unknown)  wks. Warning  (units    (u

nknown)



                    date)                         signs for PTL unknown)  



                                                  reviewed.           

 

           (unknown)  (no       (unknown)  (unknown)  working smoke  (units    (

unknown)



                    date)                         detector in home: unknown)  



                                                  Yes                 









                                         Result panel 20









           (unknown)  (no       (unknown)  (unknown)  (no value)  (units    (unk

nown)



                    date)                                   unknown)  

 

           (unknown)  (no       (unknown)  (unknown)  (+12 lb) 120/58  (units   

 (unknown)



                    date)                         N         unknown)  

 

           (unknown)  (no       (unknown)  (unknown)  (+13 lb) 104/54  (units   

 (unknown)



                    date)                         N         unknown)  

 

           (unknown)  (no       (unknown)  (unknown)  (+18 lb) 122/60  (units   

 (unknown)



                    date)                         N         unknown)  

 

           (unknown)  (no       (unknown)  (unknown)  (+22 lb) 110/62  (units   

 (unknown)



                    date)                         N         unknown)  

 

           (unknown)  (no       (unknown)  (unknown)  (+7 lb) 128/66 N  (units  

  (unknown)



                    date)                                   unknown)  

 

           (unknown)  (no       (unknown)  (unknown)  (+8 lb) 122/68 N  (units  

  (unknown)



                    date)                                   unknown)  

 

           (unknown)  (no       (unknown)  (unknown)  **Genetic  (units    (unkn

own)



                    date)                         Screening/Teratol unknown)  



                                                  ogy Counseling -           



                                                  Includes patient,           



                                                  baby's father, or           

 

           (unknown)  (no       (unknown)  (unknown)  -?-?-?-?-?-?-?-?  (units  

  (unknown)



                    date)                         -?-?-?-?  unknown)  

 

           (unknown)  (no       (unknown)  (unknown)  23  (units    (unkno

wn)



                    date)                                   unknown)  

 

           (unknown)  (no       (unknown)  (unknown)  23  (units    (unkno

wn)



                    date)                                   unknown)  

 

           (unknown)  (no       (unknown)  (unknown)  23  (units    (unkno

wn)



                    date)                                   unknown)  

 

           (unknown)  (no       (unknown)  (unknown)  23]  (units    (unkn

own)



                    date)                                   unknown)  

 

           (unknown)  (no       (unknown)  (unknown)  04/15/23  (units    (unkno

wn)



                    date)                         Ultrasound #1 32w unknown)  



                                                  3d                  

 

           (unknown)  (no       (unknown)  (unknown)  [id number]   (units    (unkno

wn)



                    date)                                   unknown)  

 

           (unknown)  (no       (unknown)  (unknown)  22  (units    (unkno

wn)



                    date)                                   unknown)  

 

           (unknown)  (no       (unknown)  (unknown)  10/11/22  (units    (unkno

wn)



                    date)                                   unknown)  

 

           (unknown)  (no       (unknown)  (unknown)  22  (units    (unkno

wn)



                    date)                                   unknown)  

 

           (unknown)  (no       (unknown)  (unknown)  22  (units    (unkno

wn)



                    date)                                   unknown)  

 

           (unknown)  (no       (unknown)  (unknown)  12w 6d 163 lb  (units    (

unknown)



                    date)                                   unknown)  

 

           (unknown)  (no       (unknown)  (unknown)  15:28     (units    (unkno

wn)



                    date)                                   unknown)  

 

           (unknown)  (no       (unknown)  (unknown)  16w 6d 167 lb  (units    (

unknown)



                    date)                                   unknown)  

 

           (unknown)  (no       (unknown)  (unknown)  20w 5d 168 lb  (units    (

unknown)



                    date)                                   unknown)  

 

           (unknown)  (no       (unknown)  (unknown)  24w 6d 173 lb  (units    (

unknown)



                    date)                                   unknown)  

 

           (unknown)  (no       (unknown)  (unknown)  28w 6d 177 lb  (units    (

unknown)



                    date)                                   unknown)  

 

           (unknown)  (no       (unknown)  (unknown)  3 wks     (units    (unkno

wn)



                    date)                                   unknown)  

 

           (unknown)  (no       (unknown)  (unknown)  4 wk      (units    (unkno

wn)



                    date)                                   unknown)  

 

           (unknown)  (no       (unknown)  (unknown)  8w 6d 162 lb  (units    (u

nknown)



                    date)                                   unknown)  

 

           (unknown)  (no       (unknown)  (unknown)  Abnormal lab  (units    (u

nknown)



                    date)                         values 1st unknown)  



                                                  trimester:           



                                                  discussed           

 

           (unknown)  (no       (unknown)  (unknown)  Abnormal lab  (units    (u

nknown)



                    date)                         values 2nd unknown)  



                                                  trimester:           



                                                  discussed           

 

           (unknown)  (no       (unknown)  (unknown)  Add'l Plan  (units    (unk

nown)



                    date)                         Details   unknown)  

 

           (unknown)  (no       (unknown)  (unknown)  Age/Sex: 28 / F  (units   

 (unknown)



                    date)                         Date of Service: unknown)  

 

           (unknown)  (no       (unknown)  (unknown)  Allergies  (units    (unkn

own)



                    date)                         ()   unknown)  

 

           (unknown)  (no       (unknown)  (unknown)  Allergies  (units    (unkn

own)



                    date)                                   unknown)  

 

           (unknown)  (no       (unknown)  (unknown)  [place], [place]  (units    (

unknown)



                    date)                         [id number]     unknown)  

 

           (unknown)  (no       (unknown)  (unknown)  Anesthesia  (units    (unk

nown)



                    date)                                   unknown)  

 

           (unknown)  (no       (unknown)  (unknown)  Aneuploidy  (units    (unk

nown)



                    date)                         Screening unknown)  



                                                  Offered: Accepted           



                                                  (wants CFDNA)           

 

           (unknown)  (no       (unknown)  (unknown)  Anticipated  (units    (un

known)



                    date)                         course of unknown)  



                                                  prenatal care:           



                                                  discussed           

 

           (unknown)  (no       (unknown)  (unknown)  Anxiety (-2016)  (units   

 (unknown)



                    date)                                   unknown)  

 

           (unknown)  (no       (unknown)  (unknown)  Arrhythmia  (units    (unk

nown)



                    date)                                   unknown)  

 

           (unknown)  (no       (unknown)  (unknown)  Assessment and  (units    

(unknown)



                    date)                         Plan      unknown)  

 

           (unknown)  (no       (unknown)  (unknown)  Attending Dr:  (units    (

unknown)



                    date)                         Julieta Au unknown)  



                                                  MD                  

 

           (unknown)  (no       (unknown)  (unknown)  Libra presents  (units   

 (unknown)



                    date)                         today for routine unknown)  



                                                  OB f/u at 20w5d.           



                                                  She reports good           



                                                  fetal               

 

           (unknown)  (no       (unknown)  (unknown)  BMI 28.8  (units    (unkno

wn)



                    date)                                   unknown)  

 

           (unknown)  (no       (unknown)  (unknown)  /62  (units    (unkn

own)



                    date)                                   unknown)  

 

           (unknown)  (no       (unknown)  (unknown)  Birth     (units    (unkno

wn)



                    date)                         Plan/Preferences unknown)  

 

           (unknown)  (no       (unknown)  (unknown)  Birth Planning  (units    

(unknown)



                    date)                                   unknown)  

 

           (unknown)  (no       (unknown)  (unknown)  Blood Pressure  (units    

(unknown)



                    date)                         Location Rt unknown)  



                                                  brachial            

 

           (unknown)  (no       (unknown)  (unknown)  Blood     (units    (unkno

wn)



                    date)                         transfusions?: unknown)  



                                                  yes (Never had           



                                                  but would accept)           

 

           (unknown)  (no       (unknown)  (unknown)  Breastfeeding:  (units    

(unknown)



                    date)                         discussed unknown)  

 

           (unknown)  (no       (unknown)  (unknown)  Chicken pox  (units    (un

known)



                    date)                         (-)   unknown)  

 

           (unknown)  (no       (unknown)  (unknown)  Childbirth  (units    (unk

nown)



                    date)                         Classes:  unknown)  



                                                  discussed           

 

           (unknown)  (no       (unknown)  (unknown)  Conceived  (units    (unkn

own)



                    date)                         naturally this unknown)  



                                                  pregnancy           

 

           (unknown)  (no       (unknown)  (unknown)  Confirmed  (units    (unkn

own)



                    date)                         23] unknown)  

 

           (unknown)  (no       (unknown)  (unknown)  Current Estimate  (units  

  (unknown)



                    date)                         23  unknown)  



                                                  Ultrasound #2 31w           



                                                  6d                  

 

           (unknown)  (no       (unknown)  (unknown)  Current   (units    (unkno

wn)



                    date)                         Pregnancy History unknown)  

 

           (unknown)  (no       (unknown)  (unknown)  DNA       (units    (unkno

wn)



                    date)                                   unknown)  

 

           (unknown)  (no       (unknown)  (unknown)  : 1995  (units   

 (unknown)



                    date)                         Acct:[id number] unknown)  

 

           (unknown)  (no       (unknown)  (unknown)  Date of positive  (units  

  (unknown)



                    date)                         home pregnancy unknown)  



                                                  test: 08/15/22           

 

           (unknown)  (no       (unknown)  (unknown)  Date      (units    (unkno

wn)



                    date)                                   unknown)  

 

           (unknown)  (no       (unknown)  (unknown) Denies Congenital  (units  

  (unknown)



                    date)                         Heart Defect, unknown)  



                                                  Denies Down           



                                                  Syndrome, Denies           



                                                  Muscular            



                                                  Dystrophy,           

 

           (unknown)  (no       (unknown)  (unknown)  Denies Maternal  (units   

 (unknown)



                    date)                         Metabolic unknown)  



                                                  Disorder (EG,TYPE           



                                                  1 Diabetes, PKU),           



                                                  Denies Patient or           

 

           (unknown)  (no       (unknown)  (unknown)  Denies Neural  (units    (

unknown)



                    date)                         Tube Defect unknown)  



                                                  (Meningomyelocele           



                                                  , Spina Bifida,           



                                                  or Anencephaly),           

 

           (unknown)  (no       (unknown)  (unknown)  Denies Sickle  (units    (

unknown)



                    date)                         Cell Disease or unknown)  



                                                  Trait (),           



                                                  Denies Hemophilia           



                                                  or other blood           

 

           (unknown)  (no       (unknown)  (unknown)  Denies [place]  (units  

  (unknown)



                    date)                         (Ashkenazi unknown)  



                                                  [employer], [place],           



                                                  [place]),           



                                                  Denies [place]           

 

           (unknown)  (no       (unknown)  (unknown)  Depression  (units    (unk

nown)



                    date)                         (-2016)   unknown)  

 

           (unknown)  (no       (unknown)  (unknown)  Depression:  (units    (un

known)



                    date)                         discussed unknown)  

 

           (unknown)  (no       (unknown)  (unknown)  Dept at   (units    (unkno

wn)



                    date)                         [phone]. unknown)  

 

           (unknown)  (no       (unknown)  (unknown)  Diet and  (units    (unkno

wn)



                    date)                         Exercise  unknown)  

 

           (unknown)  (no       (unknown)  (unknown)  Disease   (units    (unkno

wn)



                    date)                         (Ashkenazi unknown)  



                                                  Advent), Denies           



                                                  Familial            



                                                  Dysautonomia           



                                                  (Ashkenazi           



                                                  Advent),            

 

           (unknown)  (no       (unknown)  (unknown)  Documented By:  (units    

(unknown)



                    date)                         Julieta Au unknown)  



                                                  MD 23 1521           

 

           (unknown)  (no       (unknown)  (unknown)  [employer]     (units    (unkno

wn)



                    date)                                   unknown)  

 

           (unknown)  (no       (unknown)  (unknown)  MEHNAZ Calculator  (units    

(unknown)



                    date)                                   unknown)  

 

           (unknown)  (no       (unknown)  (unknown)  EGA Weight BP  (units    (

unknown)



                    date)                         UGlucose  unknown)  

 

           (unknown)  (no       (unknown)  (unknown)  Eczema (-2000)  (units    

(unknown)



                    date)                                   unknown)  

 

           (unknown)  (no       (unknown)  (unknown)  Estimated  (units    (unkn

own)



                    date)                         Delivery Date unknown)  



                                                  Method Current           

 

           (unknown)  (no       (unknown)  (unknown)  Exercise and  (units    (u

nknown)



                    date)                         activity, unknown)  



                                                  work/environmenta           



                                                  l/hazards, Sexual           



                                                  activity, X-ray           

 

           (unknown)  (no       (unknown)  (unknown)  F/u in 4 wks.  (units    (

unknown)



                    date)                                   unknown)  

 

           (unknown)  (no       (unknown)  (unknown)  Family History  (units    

(unknown)



                    date)                         (Updated 09/10/22 unknown)  



                                                  @ 21:49 by Fatoumata Flores)             

 

           (unknown)  (no       (unknown)  (unknown)  Family/Other  (units    (u

nknown)



                    date)                         Multiple  unknown)  



                                                  sclerosis           

 

           (unknown)  (no       (unknown)  (unknown)  Father    (units    (unkno

wn)



                    date)                         Hypertension unknown)  

 

           (unknown)  (no       (unknown)  (unknown)  Father of Baby:  (units   

 (unknown)



                    date)                         same      unknown)  

 

           (unknown)  (no       (unknown)  (unknown)  [facility]  (units   

 (unknown)



                    date)                         [facility] unknown)  

 

           (unknown)  (no       (unknown)  (unknown)  First Trimester  (units   

 (unknown)



                    date)                         Education unknown)  



                                                  Checklist           

 

           (unknown)  (no       (unknown)  (unknown)  Foot pain  (units    (unkn

own)



                    date)                         (-2020)   unknown)  

 

           (unknown)  (no       (unknown)  (unknown)   (SAB  (units    (unkn

own)



                    date)                         x7),Fertility Tx, unknown)  



                                                  meds only,           



                                                  started on baby           



                                                  aspirin             



                                                  10/11/2022           

 

           (unknown)  (no       (unknown)  (unknown)  GDM, on   (units    (unkno

wn)



                    date)                         Metformin 500mg unknown)  



                                                  BID                 

 

           (unknown)  (no       (unknown)  (unknown)  Genetic   (units    (unkno

wn)



                    date)                         Screening + unknown)  



                                                  Counseling           

 

           (unknown)  (no       (unknown)  (unknown)  Genetic   (units    (unkno

wn)



                    date)                         Screening unknown)  

 

           (unknown)  (no       (unknown)  (unknown)  Grandfather  (units    (un

known)



                    date)                          Cancer unknown)  

 

           (unknown)  (no       (unknown)  (unknown)  Grandfather  (units    (un

known)



                    date)                          Stroke unknown)  

 

           (unknown)  (no       (unknown)  (unknown)  Grandmother  (units    (un

known)



                    date)                          History unknown)  



                                                  of heart disease           

 

           (unknown)  (no       (unknown)  (unknown)  Grandmother  (units    (un

known)



                    date)                          Multiple unknown)  



                                                  sclerosis           

 

           (unknown)  (no       (unknown)  (unknown)   8  (units    (unkn

own)



                    date)                         Multiple births 0 unknown)  

 

           (unknown)  (no       (unknown)  (unknown)  HIV risk  (units    (unkno

wn)



                    date)                         evaluation: low unknown)  



                                                  risk                

 

           (unknown)  (no       (unknown)  (unknown)  [facility] Center  (units    (

unknown)



                    date)                         Education unknown)  

 

           (unknown)  (no       (unknown)  (unknown)  Health center  (units    (

unknown)



                    date)                         information: unknown)  



                                                  nature of           



                                                  practice            



                                                  discussed,           



                                                  prenatal            



                                                  personnel           

 

           (unknown)  (no       (unknown)  (unknown)  Height 5 ft 6 in  (units  

  (unknown)



                    date)                                   unknown)  

 

           (unknown)  (no       (unknown)  (unknown)  Hepatitis C risk  (units  

  (unknown)



                    date)                         evaluation: low unknown)  



                                                  risk                

 

           (unknown)  (no       (unknown)  (unknown)  History of  (units    (unk

nown)



                    date)                         Hepatitis B: No unknown)  

 

           (unknown)  (no       (unknown)  (unknown)  History of  (units    (unk

nown)



                    date)                         Hepatitis C: No unknown)  

 

           (unknown)  (no       (unknown)  (unknown)  History of  (units    (unk

nown)



                    date)                         recurrent unknown)  



                                                  miscarriages           

 

           (unknown)  (no       (unknown)  (unknown)  [facility]  (units    (u

nknown)



                    date)                                   unknown)  

 

           (unknown)  (no       (unknown)  (unknown)  [facility]  (units    (u

nknown)



                    date)                         Chorea, Denies unknown)  



                                                  Other inherited           



                                                  genetic or           



                                                  chromosomal           



                                                  disorder,           

 

           (unknown)  (no       (unknown)  (unknown)  , [place]  (units    (

unknown)



                    date)                         [place]  unknown)  

 

           (unknown)  (no       (unknown)  (unknown)  Hx #   (units    (u

nknown)



                    date)                         Pregnancies 0 unknown)  



                                                  Elective            



                                                  abortions 0           

 

           (unknown)  (no       (unknown)  (unknown)  Hx # Term  (units    (unkn

own)



                    date)                         Pregnancies 0 unknown)  



                                                  Ectopic             



                                                  pregnancies 0           

 

           (unknown)  (no       (unknown)  (unknown)  Infant will be  (units    

(unknown)



                    date)                         adopted?: no unknown)  

 

           (unknown)  (no       (unknown)  (unknown)  Infection  (units    (unkn

own)



                    date)                         History   unknown)  

 

           (unknown)  (no       (unknown)  (unknown)  Infectious  (units    (unk

nown)



                    date)                         Disease Education unknown)  

 

           (unknown)  (no       (unknown)  (unknown)  Infectious  (units    (unk

nown)



                    date)                         disease exposure: unknown)  



                                                  chicken pox           



                                                  immunity            



                                                  discussed,           



                                                  hepatitis risk           

 

           (unknown)  (no       (unknown)  (unknown)  Infertility  (units    (un

known)



                    date)                         ()   unknown)  

 

           (unknown)  (no       (unknown)  (unknown)  Initial Weight:  (units   

 (unknown)



                    date)                         155 lb    unknown)  

 

           (unknown)  (no       (unknown)  (unknown)  Initials  (units    (unkno

wn)



                    date)                                   unknown)  

 

           (unknown)  (no       (unknown)  (unknown)  Intake Clinical  (units   

 (unknown)



                    date)                         Staff     unknown)  

 

           (unknown)  (no       (unknown)  (unknown)  Intake Note:  (units    (u

nknown)



                    date)                                   unknown)  

 

           (unknown)  (no       (unknown)  (unknown)  Intake performed  (units  

  (unknown)



                    date)                         by:       unknown)  



                                                  Edelmira Montero           

 

           (unknown)  (no       (unknown)  (unknown)  Intake    (units    (unkno

wn)



                    date)                                   unknown)  

 

           (unknown)  (no       (unknown)  (unknown)  Irregular  (units    (unkn

own)



                    date)                         menstrual cycle unknown)  



                                                  ()             

 

           (unknown)  (no       (unknown)  (unknown)  LM        (units    (unkno

wn)



                    date)                                   unknown)  

 

           (unknown)  (no       (unknown)  (unknown)  Lipoma    (units    (unkno

wn)



                    date)                                   unknown)  

 

           (unknown)  (no       (unknown)  (unknown)  Live with  (units    (unkn

own)



                    date)                         someone with TB unknown)  



                                                  or exposed to TB:           



                                                  No                  

 

           (unknown)  (no       (unknown)  (unknown)  Loc: [facility]  (units    (unkno

wn)



                    date)                                   unknown)  

 

           (unknown)  (no       (unknown)  (unknown)  Marital status:  (units   

 (unknown)



                    date)                            unknown)  

 

           (unknown)  (no       (unknown)  (unknown)  Medical History  (units   

 (unknown)



                    date)                         (Updated 23 unknown)  



                                                  @ 16:18 by           



                                                  Julieta Au MD)                 

 

           (unknown)  (no       (unknown)  (unknown)  Medications  (units    (un

known)



                    date)                                   unknown)  

 

           (unknown)  (no       (unknown)  (unknown)  Mother Gastric  (units    

(unknown)



                    date)                         cancer    unknown)  

 

           (unknown)  (no       (unknown)  (unknown)  N No 142 13 N/A  (units   

 (unknown)



                    date)                         4wk       unknown)  

 

           (unknown)  (no       (unknown)  (unknown)  N No no 143 17  (units    

(unknown)



                    date)                         N/A absent AFP 4 unknown)  



                                                  wks                 

 

           (unknown)  (no       (unknown)  (unknown)  N No no 178 9  (units    (

unknown)



                    date)                         N/A absent unknown)  



                                                  long/closed AGA 9           

 

           (unknown)  (no       (unknown)  (unknown)  N Yes no 141 24  (units   

 (unknown)



                    date)                         N/A absent 4 wks unknown)  

 

           (unknown)  (no       (unknown)  (unknown)  N Yes no 142 20  (units   

 (unknown)



                    date)                         N/A absent AFP unknown)  



                                                  negative            

 

           (unknown)  (no       (unknown)  (unknown)  N Yes no 144 29  (units   

 (unknown)



                    date)                         Vertex absent AGA unknown)  



                                                  29w5d               

 

           (unknown)  (no       (unknown)  (unknown)  NF        (units    (unkno

wn)



                    date)                                   unknown)  

 

           (unknown)  (no       (unknown)  (unknown)  Notes     (units    (unkno

wn)



                    date)                                   unknown)  

 

           (unknown)  (no       (unknown)  (unknown)  Number of Living  (units  

  (unknown)



                    date)                         Children 0 unknown)  

 

           (unknown)  (no       (unknown)  (unknown)  Number of  (units    (unkn

own)



                    date)                         fetuses:: Single unknown)  

 

           (unknown)  (no       (unknown)  (unknown)  Nutrition and  (units    (

unknown)



                    date)                         weight gain unknown)  



                                                  counseling:           



                                                  special diet:           



                                                  discussed           

 

           (unknown)  (no       (unknown)  (unknown)  OB Office Visit  (units   

 (unknown)



                    date)                                   unknown)  

 

           (unknown)  (no       (unknown)  (unknown)  OB Visit Log  (units    (u

nknown)



                    date)                                   unknown)  

 

           (unknown)  (no       (unknown)  (unknown)  OB check  (units    (unkno

wn)



                    date)                                   unknown)  

 

           (unknown)  (no       (unknown)  (unknown)  On U/S: AGA  (units    (un

known)



                    date)                         29w5d. 3#4oz unknown)  



                                                  66%ile. Nl AFV.           



                                                  Plan: Metformin           



                                                  500mg BID. F/U 3           

 

           (unknown)  (no       (unknown)  (unknown)  On birth control  (units  

  (unknown)



                    date)                         at conception?: unknown)  



                                                  No                  

 

           (unknown)  (no       (unknown)  (unknown)  Other Estimates  (units   

 (unknown)



                    date)                         23 LMP unknown)  



                                                  (Certain) 34w 0d           

 

           (unknown)  (no       (unknown)  (unknown)  Ovarian cyst  (units    (u

nknown)



                    date)                         ()   unknown)  

 

           (unknown)  (no       (unknown)  (unknown)  PCOS (polycystic  (units  

  (unknown)



                    date)                         ovarian syndrome) unknown)  

 

           (unknown)  (no       (unknown)  (unknown)  PFSH      (units    (unkno

wn)



                    date)                                   unknown)  

 

           (unknown)  (no       (unknown)  (unknown)  Pap performed?:  (units   

 (unknown)



                    date)                         No        unknown)  

 

           (unknown)  (no       (unknown)  (unknown)  Para 0    (units    (unkno

wn)



                    date)                         Spontaneous unknown)  



                                                  abortions 7           

 

           (unknown)  (no       (unknown)  (unknown)  Partner history  (units   

 (unknown)



                    date)                         of STD: denies hx unknown)  

 

           (unknown)  (no       (unknown)  (unknown)  Partner history  (units   

 (unknown)



                    date)                         of genital unknown)  



                                                  herpes: No           

 

           (unknown)  (no       (unknown)  (unknown)  Partner: Franklin  (units    (

unknown)



                    date)                         [place]  unknown)  

 

           (unknown)  (no       (unknown)  (unknown)  Patient comes in  (units  

  (unknown)



                    date)                         for follow-up OB unknown)  



                                                  visit. She had           



                                                  some pelvic pain           



                                                  that                

 

           (unknown)  (no       (unknown)  (unknown)  Patient presents  (units  

  (unknown)



                    date)                         for a new OB unknown)  



                                                  visit at 8 weeks           



                                                  gestation. She is           

 

           (unknown)  (no       (unknown)  (unknown)  Patient presents  (units  

  (unknown)



                    date)                         for a routine unknown)  



                                                  prenatal visit,           



                                                  accompanied by           



                                                  her .           

 

           (unknown)  (no       (unknown)  (unknown)  Patient's age 33  (units  

  (unknown)



                    date)                         years or older as unknown)  



                                                  of estimated date           



                                                  of delivery: No           

 

           (unknown)  (no       (unknown)  (unknown)  Patient:  (units    (unkno

wn)



                    date)                         Libra Prieto unknown)  



                                                  MR#: [id number]            

 

           (unknown)  (no       (unknown)  (unknown)  Pediatrician:  (units    (

unknown)



                    date)                         DOD [place] vs unknown)  



                                                  [facility]           



                                                  [facility]           



                                                  [facility]             

 

           (unknown)  (no       (unknown)  (unknown)  Personal history  (units  

  (unknown)



                    date)                         of STD: denies hx unknown)  

 

           (unknown)  (no       (unknown)  (unknown)  Personal history  (units  

  (unknown)



                    date)                         of genital unknown)  



                                                  herpes: No           

 

           (unknown)  (no       (unknown)  (unknown)  Plantar   (units    (unkno

wn)



                    date)                         fasciitis unknown)  

 

           (unknown)  (no       (unknown)  (unknown)  Position Sitting  (units  

  (unknown)



                    date)                                   unknown)  

 

           (unknown)  (no       (unknown)  (unknown)  Postpartum  (units    (unk

nown)



                    date)                         family    unknown)  



                                                  planning/Tubal           



                                                  sterilization:           



                                                  further             



                                                  discussion needed           

 

           (unknown)  (no       (unknown)  (unknown)  Pregnancy  (units    (unkn

own)



                    date)                         History   unknown)  

 

           (unknown)  (no       (unknown)  (unknown)  Pregnancy type::  (units  

  (unknown)



                    date)                         Other Normal unknown)  



                                                  Pregnancy           

 

           (unknown)  (no       (unknown)  (unknown)  Prenatal  (units    (unkno

wn)



                    date)                         Education unknown)  

 

           (unknown)  (no       (unknown)  (unknown)  Prenatal Initial  (units  

  (unknown)



                    date)                         Assessment unknown)  

 

           (unknown)  (no       (unknown)  (unknown)  Prenatal  (units    (unkno

wn)



                    date)                         Specific  unknown)  



                                                  Issues/Plans           

 

           (unknown)  (no       (unknown)  (unknown)  Prenatal  (units    (unkno

wn)



                    date)                         Testing:  unknown)  



                                                  discussed           

 

           (unknown)  (no       (unknown)  (unknown)  Prenatal Visit  (units    

(unknown)



                    date)                                   unknown)  

 

           (unknown)  (no       (unknown)  (unknown)  Prenatal  (units    (unkno

wn)



                    date)                         education packet: unknown)  



                                                  Child birth           



                                                  education/plan,           



                                                  Pregnancy           



                                                  symptoms,           

 

           (unknown)  (no       (unknown)  (unknown)  Primary Care  (units    (u

nknown)



                    date)                         Provider: BASIM Escobar unknown)  



                                                  [facility]              

 

           (unknown)  (no       (unknown)  (unknown)  Primary Ob  (units    (unk

nown)



                    date)                         Provider: unknown)  



                                                  Julieta Au           

 

           (unknown)  (no       (unknown)  (unknown)  Prior     (units    (unkno

wn)



                    date)                         GBS-Infected unknown)  



                                                  child: No           

 

           (unknown)  (no       (unknown)  (unknown)  Providers  (units    (unkn

own)



                    date)                                   unknown)  

 

           (unknown)  (no       (unknown)  (unknown)  Pt presents for  (units   

 (unknown)



                    date)                         a PNV at 16+6 unknown)  



                                                  wks. No FM. No           



                                                  LOF/VB. Rec'd flu           



                                                  shot                

 

           (unknown)  (no       (unknown)  (unknown)  Pt presents for  (units   

 (unknown)



                    date)                         a routine PNV at unknown)  



                                                  28 +6 wks           



                                                  gestation.           



                                                  Abnormal 3 hr           



                                                  GTT.                

 

           (unknown)  (no       (unknown)  (unknown)  Rash or viral  (units    (

unknown)



                    date)                         illness since unknown)  



                                                  last menstrual           



                                                  period: No           

 

           (unknown)  (no       (unknown)  (unknown)  Rash      (units    (unkno

wn)



                    date)                                   unknown)  

 

           (unknown)  (no       (unknown)  (unknown)  Reason For Visit  (units  

  (unknown)



                    date)                                   unknown)  

 

           (unknown)  (no       (unknown)  (unknown)  Recent travel  (units    (

unknown)



                    date)                         outside of unknown)  



                                                  country?: No           

 

           (unknown)  (no       (unknown)  (unknown)  Recurrent  (units    (unkn

own)



                    date)                         pregnancy loss or unknown)  



                                                  a stillbirth: Yes           

 

           (unknown)  (no       (unknown)  (unknown)  Reports over the  (units  

  (unknown)



                    date)                         counter   unknown)  



                                                  medications           



                                                  (diclofenac),           



                                                  Denies alcohol,           



                                                  Denies              

 

           (unknown)  (no       (unknown)  (unknown)  Safety    (units    (unkno

wn)



                    date)                                   unknown)  

 

           (unknown)  (no       (unknown)  (unknown)  Second Trimester  (units  

  (unknown)



                    date)                         Education unknown)  



                                                  Checklist           

 

           (unknown)  (no       (unknown)  (unknown)  Selecting a  (units    (un

known)



                    date)                          care unknown)  



                                                  provider: further           



                                                  discussion needed           

 

           (unknown)  (no       (unknown)  (unknown)  She is at 22  (units    (u

nknown)



                    date)                         weeks 6 days. She unknown)  



                                                  has felt good           



                                                  fetal movement.           



                                                  She says it is           

 

           (unknown)  (no       (unknown)  (unknown)  Shingles  (units    (unkno

wn)



                    date)                                   unknown)  

 

           (unknown)  (no       (unknown)  (unknown)  Signed By:  (units    (unk

nown)



                    date)                                   unknown)  

 

           (unknown)  (no       (unknown)  (unknown)  Signs and  (units    (unkn

own)



                    date)                         symptoms of unknown)  



                                                   labor:           



                                                  discussed           

 

           (unknown)  (no       (unknown)  (unknown)  Smoking Status:  (units   

 (unknown)



                    date)                         Never smoker unknown)  

 

           (unknown)  (no       (unknown)  (unknown)  Social History  (units    

(unknown)



                    date)                                   unknown)  

 

           (unknown)  (no       (unknown)  (unknown)  Support   (units    (unkno

wn)



                    date)                         Person(s):: Franklin unknown)  

 

           (unknown)  (no       (unknown)  (unknown)  Surgical History  (units  

  (unknown)



                    date)                         (Updated 09/10/22 unknown)  



                                                  @ 21:46 by Fatoumata Flores)             

 

           (unknown)  (no       (unknown)  (unknown)  Surrogate  (units    (unkn

own)



                    date)                         pregnancy?: no unknown)  

 

           (unknown)  (no       (unknown)  (unknown)  Symptoms since  (units    

(unknown)



                    date)                         LMP: Reports unknown)  



                                                  amenorrhea,           



                                                  nausea, fatigue,           



                                                  breast              



                                                  tenderness,           

 

           (unknown)  (no       (unknown)  (unknown)  Teratogen  (units    (unkn

own)



                    date)                         Exposures since unknown)  



                                                  LMP/Conception:           



                                                  Denies              



                                                  prescription           



                                                  medications,           

 

           (unknown)  (no       (unknown)  (unknown)  Testing   (units    (unkno

wn)



                    date)                         Education unknown)  

 

           (unknown)  (no       (unknown)  (unknown)  Testing   (units    (unkno

wn)



                    date)                         education unknown)  



                                                  completed: group           



                                                  B strep, Spina           



                                                  bifida testing           



                                                  and Cell Free           

 

           (unknown)  (no       (unknown)  (unknown)  This note may  (units    (

unknown)



                    date)                         have been all or unknown)  



                                                  partially           



                                                  generated using           



                                                  voice recognition           

 

           (unknown)  (no       (unknown)  (unknown)  Tobacco +  (units    (unkn

own)



                    date)                         Substance Use unknown)  

 

           (unknown)  (no       (unknown)  (unknown)  Tobacco Status  (units    

(unknown)



                    date)                                   unknown)  

 

           (unknown)  (no       (unknown)  (unknown)  Trimester:: 3rd  (units   

 (unknown)



                    date)                         Trimester unknown)  



                                                  (28wks-Del)           

 

           (unknown)  (no       (unknown)  (unknown)  Tumor (-10/2012)  (units  

  (unknown)



                    date)                                   unknown)  

 

           (unknown)  (no       (unknown)  (unknown)  Type(s) of  (units    (unk

nown)



                    date)                         exercise: unknown)  



                                                  bicycling           



                                                  (stationary           



                                                  bike), regular           



                                                  exercise, weight           

 

           (unknown)  (no       (unknown)  (unknown) UProtein Movement  (units  

  (unknown)



                    date)                         PreLabor FHR Fndl unknown)  



                                                  Ht Pres Edema           



                                                  Cerv Exam           



                                                  US/Comment Next           



                                                  Appt                

 

           (unknown)  (no       (unknown)  (unknown)  Ultrasound  (units    (unk

nown)



                    date)                         performed?: No unknown)  

 

           (unknown)  (no       (unknown)  (unknown)  Varicella/chicke  (units  

  (unknown)



                    date)                         n pox status: unknown)  



                                                  previous disease           

 

           (unknown)  (no       (unknown)  (unknown)  Visit Date:  (units    (un

known)



                    date)                         23 Last unknown)  



                                                  Updated by:           



                                                  Julieta Au MD                  

 

           (unknown)  (no       (unknown)  (unknown)  Visit Date:  (units    (un

known)



                    date)                         23 Last unknown)  



                                                  Updated by:           



                                                  Julieta Au MD                  

 

           (unknown)  (no       (unknown)  (unknown)  Visit Date:  (units    (un

known)



                    date)                         22 Last unknown)  



                                                  Updated by:           



                                                  Julieta Au MD                  

 

           (unknown)  (no       (unknown)  (unknown)  Visit Date:  (units    (un

known)



                    date)                         10/11/22 Last unknown)  



                                                  Updated by:           



                                                  Fanny Baker MD                  

 

           (unknown)  (no       (unknown)  (unknown)  Visit Date:  (units    (un

known)



                    date)                         22 Last unknown)  



                                                  Updated by:           



                                                  Julieta Au MD                  

 

           (unknown)  (no       (unknown)  (unknown)  Visit Date:  (units    (un

known)



                    date)                         22 Last unknown)  



                                                  Updated by: Berta Salinas P.A-C           

 

           (unknown)  (no       (unknown)  (unknown)  Visit Reasons:  (units    

(unknown)



                    date)                         OB        unknown)  

 

           (unknown)  (no       (unknown)  (unknown)  Vitals    (units    (unkno

wn)



                    date)                                   unknown)  

 

           (unknown)  (no       (unknown)  (unknown)  Vitamins and  (units    (u

nknown)



                    date)                         iron, Diet and unknown)  



                                                  weight gain, Fish           



                                                  and mercury           



                                                  intake, Caffeine           



                                                  use,                

 

           (unknown)  (no       (unknown)  (unknown)  WG        (units    (unkno

wn)



                    date)                                   unknown)  

 

           (unknown)  (no       (unknown)  (unknown)  Weeks     (units    (unkno

wn)



                    date)                         gestation:: 31 unknown)  

 

           (unknown)  (no       (unknown)  (unknown)  Weight 179 lb  (units    (

unknown)



                    date)                                   unknown)  

 

           (unknown)  (no       (unknown)  (unknown)  [place] teeth  (units    (u

nknown)



                    date)                         extracted unknown)  

 

           (unknown)  (no       (unknown)  (unknown)  Zika virus  (units    (unk

nown)



                    date)                         exposure: No unknown)  

 

           (unknown)  (no       (unknown)  (unknown)  accompanied by  (units    

(unknown)



                    date)                         her . She unknown)  



                                                  went through           



                                                  fertility           



                                                  treatments with           

 

           (unknown)  (no       (unknown)  (unknown)  alcohol intake:  (units   

 (unknown)



                    date)                         never     unknown)  

 

           (unknown)  (no       (unknown)  (unknown)  anterior  (units    (unkno

wn)



                    date)                         placenta. Routine unknown)  



                                                  precautions           



                                                  reviewed with the           



                                                  patient. Due to           



                                                  1st                 

 

           (unknown)  (no       (unknown)  (unknown)  anyone in either  (units  

  (unknown)



                    date)                         family with: unknown)  

 

           (unknown)  (no       (unknown)  (unknown)  baby's father  (units    (

unknown)



                    date)                         had a child with unknown)  



                                                  birth defects not           



                                                  listed above and           



                                                  Denies Other           

 

           (unknown)  (no       (unknown)  (unknown)  back pain.  (units    (unk

nown)



                    date)                         Advised   unknown)  



                                                  stretching, belly           



                                                  band, and PT if           



                                                  not improving.           



                                                  AFP was             

 

           (unknown)  (no       (unknown)  (unknown)  blood sugar  (units    (un

known)



                    date)                         diagnostic (Blood unknown)  



                                                  Glucose Test           



                                                  strips) #100 ea           



                                                  23 [Rx           

 

           (unknown)  (no       (unknown)  (unknown)  blood-glucose  (units    (

unknown)



                    date)                         meter #1 ea unknown)  



                                                  23 [Rx           



                                                  Confirmed           



                                                  23]           

 

           (unknown)  (no       (unknown)  (unknown)  by ultrasound is  (units  

  (unknown)



                    date)                         normal with good unknown)  



                                                  fetal movement,           



                                                  normal appearing           



                                                  fluid,              

 

           (unknown)  (no       (unknown)  (unknown)  caffeine: Yes  (units    (

unknown)



                    date)                         (1-2 cups unknown)  



                                                  tea/day)            

 

           (unknown)  (no       (unknown)  (unknown)  carbon monox  (units    (u

nknown)



                    date)                         detector in home: unknown)  



                                                  Yes                 

 

           (unknown)  (no       (unknown)  (unknown)  cfDNA normal  (units    (u

nknown)



                    date)                         female, AFP unknown)  



                                                  negative            

 

           (unknown)  (no       (unknown)  (unknown)  consistent with  (units   

 (unknown)



                    date)                         9 weeks 0 days. unknown)  



                                                  Yolk sac visible.           



                                                  Fetal heart rate           



                                                  178 beats           

 

           (unknown)  (no       (unknown)  (unknown)  current   (units    (unkno

wn)



                    date)                         occupational unknown)  



                                                  exposures/hazards           



                                                  : No                

 

           (unknown)  (no       (unknown)  (unknown)  daily servings  (units    

(unknown)



                    date)                         fruits/ve-4 unknown)  

 

           (unknown)  (no       (unknown)  (unknown)  described, visit  (units  

  (unknown)



                    date)                         schedule  unknown)  



                                                  reviewed,           



                                                  ultrasounds           



                                                  policy reviewed,           



                                                  coverage 24           

 

           (unknown)  (no       (unknown)  (unknown)  developing a  (units    (u

nknown)



                    date)                         pattern. No unknown)  



                                                  leakage of fluid           



                                                  or vaginal           



                                                  bleeding. No           



                                                  contractions           

 

           (unknown)  (no       (unknown)  (unknown)  discussed,  (units    (unk

nown)



                    date)                         tuberculosis unknown)  



                                                  exposure            



                                                  discussed, CMV           



                                                  discussed,           



                                                  Toxoplasmosis           

 

           (unknown)  (no       (unknown)  (unknown)  disorders,  (units    (unk

nown)



                    date)                         Denies Cystic unknown)  



                                                  Fibrosis, Denies           



                                                  Mental              



                                                  Retardation/Autis           



                                                  m, Denies           

 

           (unknown)  (no       (unknown)  (unknown)  do you feel safe  (units  

  (unknown)



                    date)                         at home: Yes unknown)  

 

           (unknown)  (no       (unknown)  (unknown)  during the past  (units   

 (unknown)



                    date)                         year weight has: unknown)  



                                                  remained stable           

 

           (unknown)  (no       (unknown)  (unknown)  education level:  (units  

  (unknown)



                    date)                         college   unknown)  



                                                  (Associate's           



                                                  degree)             

 

           (unknown)  (no       (unknown)  (unknown)  exposure,  (units    (unkn

own)



                    date)                         Medication use, unknown)  



                                                  Sauna/hot tub           



                                                  use, Dental care,           



                                                  Travel and           



                                                  Influenza           

 

           (unknown)  (no       (unknown)  (unknown)  fire      (units    (unkno

wn)



                    date)                         extinguisher in unknown)  



                                                  home: Yes           

 

           (unknown)  (no       (unknown)  (unknown)  firearms in  (units    (un

known)



                    date)                         home: Yes unknown)  



                                                  firearms unloaded           



                                                  and locked: Yes           

 

           (unknown)  (no       (unknown)  (unknown)  frequency: 5-6  (units    

(unknown)



                    date)                         times per week unknown)  

 

           (unknown)  (no       (unknown)  (unknown)  gestational sac  (units   

 (unknown)



                    date)                         with a fetus with unknown)  



                                                  a crown-rump           



                                                  length measuring           



                                                  2.29 cm             

 

           (unknown)  (no       (unknown)  (unknown)  have occurred.  (units    

(unknown)



                    date)                         If there are any unknown)  



                                                  questions, please           



                                                  contact the           



                                                  Medical Records           

 

           (unknown)  (no       (unknown)  (unknown)  hours a day and  (units   

 (unknown)



                    date)                         participation of unknown)  



                                                  father in           



                                                  prenatal care and           



                                                  office visits           

 

           (unknown)  (no       (unknown)  (unknown)  household  (units    (unkn

own)



                    date)                         members: spouse unknown)  



                                                  and family           



                                                  (father and           



                                                  father-in-law)           

 

           (unknown)  (no       (unknown)  (unknown)  housing: house  (units    

(unknown)



                    date)                                   unknown)  

 

           (unknown)  (no       (unknown)  (unknown)  illicit drugs  (units    (

unknown)



                    date)                         and Denies other unknown)  

 

           (unknown)  (no       (unknown)  (unknown)  kag       (units    (unkno

wn)



                    date)                                   unknown)  

 

           (unknown)  (no       (unknown)  (unknown)  lancets #100 ea  (units   

 (unknown)



                    date)                         23 [Rx unknown)  



                                                  Confirmed           



                                                  23]           

 

           (unknown)  (no       (unknown)  (unknown)  last visit.  (units    (un

known)



                    date)                         Plan: AFP today. unknown)  



                                                  20 wk u/s           



                                                  reviewed. F/U 4           



                                                  wks. Warning           



                                                  signs               

 

           (unknown)  (no       (unknown)  (unknown)  lifting and  (units    (un

known)



                    date)                         other (hiking) unknown)  

 

           (unknown)  (no       (unknown)  (unknown)  lives     (units    (unkno

wn)



                    date)                         independently: unknown)  



                                                  Yes                 

 

           (unknown)  (no       (unknown)  (unknown)  marital status:  (units   

 (unknown)



                    date)                            unknown)  

 

           (unknown)  (no       (unknown)  (unknown)  may occur.  (units    (unk

nown)



                    date)                         Occasional unknown)  



                                                  wrong-word or           



                                                  'sound-alike'           



                                                  substitutions may           



                                                  have                

 

           (unknown)  (no       (unknown)  (unknown)  medication only.  (units  

  (unknown)



                    date)                         Had 7     unknown)  



                                                  miscarriages. No           



                                                  bleeding with           



                                                  this pregnancy.           



                                                  This 1              

 

           (unknown)  (no       (unknown)  (unknown)  metformin 500 mg  (units  

  (unknown)



                    date)                         tablet 500 mg PO unknown)  



                                                  BID #60 tabs           



                                                  23 [Rx           



                                                  Confirmed           



                                                  23]           

 

           (unknown)  (no       (unknown)  (unknown)  movement and  (units    (u

nknown)



                    date)                         denies VB, LOF, unknown)  



                                                  cramping and           



                                                  regular             



                                                  contractions. Pt           



                                                  reports low           

 

           (unknown)  (no       (unknown)  (unknown)  negative.  (units    (unkn

own)



                    date)                         Anatomy US unknown)  



                                                  scheduled for           



                                                  later today.           



                                                  Warning             



                                                  precautions           



                                                  reviewed.           

 

           (unknown)  (no       (unknown)  (unknown)  nickel Allergy  (units    

(unknown)



                    date)                         (Mild, Verified unknown)  



                                                  23 15:27)           

 

           (unknown)  (no       (unknown)  (unknown)  number of  (units    (unkn

own)



                    date)                         children: 0 unknown)  

 

           (unknown)  (no       (unknown)  (unknown)  occupational  (units    (u

nknown)



                    date)                         status: employed unknown)  



                                                  (active duty           



                                                  avionics            



                                                  ,           



                                                  [employer] job            

 

           (unknown)  (no       (unknown)  (unknown)  occurred due to  (units   

 (unknown)



                    date)                         the inherent unknown)  



                                                  limitations of           



                                                  voice recognition           



                                                  software. Please           

 

           (unknown)  (no       (unknown)  (unknown)  or cramping.  (units    (u

nknown)



                    date)                         Plan: 1 hour unknown)  



                                                  glucose ordered.           



                                                  Follow-up in 4           



                                                  weeks. Warning           

 

           (unknown)  (no       (unknown)  (unknown)  per minute.  (units    (un

known)



                    date)                         Normal ovaries unknown)  



                                                  bilaterally.           



                                                  Plan: Follow-up           



                                                  in 4 weeks.           



                                                  Warning             

 

           (unknown)  (no       (unknown)  (unknown)  pets and  (units    (unkno

wn)



                    date)                         animals: Yes (1 unknown)  



                                                  large dog,           



                                                  chickens)           

 

           (unknown)  (no       (unknown)  (unknown)  precautions,  (units    (u

nknown)



                    date)                         Listeriosis unknown)  



                                                  prevention and           



                                                  Rubella             



                                                  Immunization           

 

           (unknown)  (no       (unknown)  (unknown)  preeclampsia.  (units    (

unknown)



                    date)                                   unknown)  

 

           (unknown)  (no       (unknown)  (unknown)  pregnancy  (units    (unkn

own)



                    date)                         discussed unknown)  



                                                  starting baby           



                                                  aspirin per day           



                                                  to decrease her           



                                                  risk for            

 

           (unknown)  (no       (unknown)  (unknown)  prenat.vits,nan,  (units  

  (unknown)



                    date)                         min-iron-folic 1 unknown)  



                                                  tab PO DAILY           



                                                  22 [History           



                                                  Confirmed           

 

           (unknown)  (no       (unknown)  (unknown)  read the note  (units    (

unknown)



                    date)                         carefully and unknown)  



                                                  recognize, using           



                                                  context, where           



                                                  these               



                                                  substitutions           

 

           (unknown)  (no       (unknown)  (unknown)  reviewed.  (units    (unkn

own)



                    date)                                   unknown)  

 

           (unknown)  (no       (unknown)  (unknown)  seatbelt use:  (units    (

unknown)



                    date)                         always    unknown)  

 

           (unknown)  (no       (unknown)  (unknown)  second hand  (units    (un

known)



                    date)                         exposure: Yes unknown)  



                                                  (father-in-law           



                                                  smokes outside           



                                                  the house)           

 

           (unknown)  (no       (unknown)  (unknown)  signs reviewed.  (units   

 (unknown)



                    date)                         cfDNA ordered. unknown)  

 

           (unknown)  (no       (unknown)  (unknown)  signs reviewed.  (units   

 (unknown)



                    date)                                   unknown)  

 

           (unknown)  (no       (unknown)  (unknown)  software.  (units    (unkn

own)



                    date)                         Although every unknown)  



                                                  effort is made to           



                                                  edit content,           



                                                  transcription           



                                                  errors              

 

           (unknown)  (no       (unknown)  (unknown)  special madi  (units    (

unknown)



                    date)                         needs: No unknown)  

 

           (unknown)  (no       (unknown)  (unknown)  substance use  (units    (

unknown)



                    date)                         type: does not unknown)  



                                                  use                 

 

           (unknown)  (no       (unknown)  (unknown)  travel history:  (units   

 (unknown)



                    date)                         over 6 months ago unknown)  

 

           (unknown)  (no       (unknown)  (unknown)  urinary   (units    (unkno

wn)



                    date)                         frequency and unknown)  



                                                  irritability           

 

           (unknown)  (no       (unknown)  (unknown)  vaccine (Annual  (units   

 (unknown)



                    date)                         flu, Phizer Covid unknown)  



                                                  x2)                 

 

           (unknown)  (no       (unknown)  (unknown)  w0d 4 wks  (units    (unkn

own)



                    date)                                   unknown)  

 

           (unknown)  (no       (unknown)  (unknown)  was not using  (units    (

unknown)



                    date)                         any infertility unknown)  



                                                  medications.           



                                                  Ultrasound: An           



                                                  intrauterine           

 

           (unknown)  (no       (unknown)  (unknown)  water heater  (units    (u

nknown)



                    date)                         temp set < 120 unknown)  



                                                  deg: Yes            

 

           (unknown)  (no       (unknown)  (unknown)  well-balanced  (units    (

unknown)



                    date)                         diet: daily or unknown)  



                                                  most days           

 

           (unknown)  (no       (unknown)  (unknown)  went away with  (units    

(unknown)



                    date)                         laying down. No unknown)  



                                                  vaginal bleeding.           



                                                  No fevers. Fetal           



                                                  heart tone           

 

           (unknown)  (no       (unknown)  (unknown)  while pregnant)  (units   

 (unknown)



                    date)                                   unknown)  

 

           (unknown)  (no       (unknown)  (unknown)  wks. Warning  (units    (u

nknown)



                    date)                         signs for PTL unknown)  



                                                  reviewed.           

 

           (unknown)  (no       (unknown)  (unknown)  working smoke  (units    (

unknown)



                    date)                         detector in home: unknown)  



                                                  Yes                 









                                         Result panel 21









           (unknown)  (no       (unknown)  (unknown)  (no value)  (units    (unk

nown)



                    date)                                   unknown)  

 

           (unknown)  (no       (unknown)  (unknown)  (+12 lb) 120/58  (units   

 (unknown)



                    date)                         N         unknown)  

 

           (unknown)  (no       (unknown)  (unknown)  (+13 lb) 104/54  (units   

 (unknown)



                    date)                         N         unknown)  

 

           (unknown)  (no       (unknown)  (unknown)  (+18 lb) 122/60  (units   

 (unknown)



                    date)                         N         unknown)  

 

           (unknown)  (no       (unknown)  (unknown)  (+22 lb) 110/62  (units   

 (unknown)



                    date)                         N         unknown)  

 

           (unknown)  (no       (unknown)  (unknown)  (+24 lb) 128/62  (units   

 (unknown)



                    date)                         N         unknown)  

 

           (unknown)  (no       (unknown)  (unknown)  (+7 lb) 128/66 N  (units  

  (unknown)



                    date)                                   unknown)  

 

           (unknown)  (no       (unknown)  (unknown)  (+8 lb) 122/68 N  (units  

  (unknown)



                    date)                                   unknown)  

 

           (unknown)  (no       (unknown)  (unknown)  **Genetic  (units    (unkn

own)



                    date)                         Screening/Teratol unknown)  



                                                  ogy Counseling -           



                                                  Includes patient,           



                                                  baby's father, or           

 

           (unknown)  (no       (unknown)  (unknown)  -?-?-?-?-?-?-?-?  (units  

  (unknown)



                    date)                         -?-?-?-?  unknown)  

 

           (unknown)  (no       (unknown)  (unknown)  23  (units    (unkno

wn)



                    date)                                   unknown)  

 

           (unknown)  (no       (unknown)  (unknown)  23  (units    (unkno

wn)



                    date)                                   unknown)  

 

           (unknown)  (no       (unknown)  (unknown)  23  (units    (unkno

wn)



                    date)                                   unknown)  

 

           (unknown)  (no       (unknown)  (unknown)  23]  (units    (unkn

own)



                    date)                                   unknown)  

 

           (unknown)  (no       (unknown)  (unknown)  04/15/23  (units    (unkno

wn)



                    date)                         Ultrasound #1 32w unknown)  



                                                  3d                  

 

           (unknown)  (no       (unknown)  (unknown)  [id number]   (units    (unkno

wn)



                    date)                                   unknown)  

 

           (unknown)  (no       (unknown)  (unknown)  22  (units    (unkno

wn)



                    date)                                   unknown)  

 

           (unknown)  (no       (unknown)  (unknown)  10/11/22  (units    (unkno

wn)



                    date)                                   unknown)  

 

           (unknown)  (no       (unknown)  (unknown)  22  (units    (unkno

wn)



                    date)                                   unknown)  

 

           (unknown)  (no       (unknown)  (unknown)  22  (units    (unkno

wn)



                    date)                                   unknown)  

 

           (unknown)  (no       (unknown)  (unknown)  12w 6d 163 lb  (units    (

unknown)



                    date)                                   unknown)  

 

           (unknown)  (no       (unknown)  (unknown)  15:28     (units    (unkno

wn)



                    date)                                   unknown)  

 

           (unknown)  (no       (unknown)  (unknown)  16w 6d 167 lb  (units    (

unknown)



                    date)                                   unknown)  

 

           (unknown)  (no       (unknown)  (unknown)  20w 5d 168 lb  (units    (

unknown)



                    date)                                   unknown)  

 

           (unknown)  (no       (unknown)  (unknown)  24w 6d 173 lb  (units    (

unknown)



                    date)                                   unknown)  

 

           (unknown)  (no       (unknown)  (unknown)  28w 6d 177 lb  (units    (

unknown)



                    date)                                   unknown)  

 

           (unknown)  (no       (unknown)  (unknown)  3 wks     (units    (unkno

wn)



                    date)                                   unknown)  

 

           (unknown)  (no       (unknown)  (unknown)  31w 6d 179 lb  (units    (

unknown)



                    date)                                   unknown)  

 

           (unknown)  (no       (unknown)  (unknown)  4 wk      (units    (unkno

wn)



                    date)                                   unknown)  

 

           (unknown)  (no       (unknown)  (unknown)  8w 6d 162 lb  (units    (u

nknown)



                    date)                                   unknown)  

 

           (unknown)  (no       (unknown)  (unknown)  Abnormal lab  (units    (u

nknown)



                    date)                         values 1st unknown)  



                                                  trimester:           



                                                  discussed           

 

           (unknown)  (no       (unknown)  (unknown)  Abnormal lab  (units    (u

nknown)



                    date)                         values 2nd unknown)  



                                                  trimester:           



                                                  discussed           

 

           (unknown)  (no       (unknown)  (unknown)  Add'l Plan  (units    (unk

nown)



                    date)                         Details   unknown)  

 

           (unknown)  (no       (unknown)  (unknown)  Age/Sex: 28 / F  (units   

 (unknown)



                    date)                         Date of Service: unknown)  

 

           (unknown)  (no       (unknown)  (unknown)  Allergies  (units    (unkn

own)



                    date)                         (-)   unknown)  

 

           (unknown)  (no       (unknown)  (unknown)  Allergies  (units    (unkn

own)



                    date)                                   unknown)  

 

           (unknown)  (no       (unknown)  (unknown)  [place]  (units    (

unknown)



                    date)                         [address]     unknown)  

 

           (unknown)  (no       (unknown)  (unknown)  Anesthesia  (units    (unk

nown)



                    date)                                   unknown)  

 

           (unknown)  (no       (unknown)  (unknown)  Aneuploidy  (units    (unk

nown)



                    date)                         Screening unknown)  



                                                  Offered: Accepted           



                                                  (wants CFDNA)           

 

           (unknown)  (no       (unknown)  (unknown)  Anticipated  (units    (un

known)



                    date)                         course of unknown)  



                                                  prenatal care:           



                                                  discussed           

 

           (unknown)  (no       (unknown)  (unknown)  Anxiety (-2016)  (units   

 (unknown)



                    date)                                   unknown)  

 

           (unknown)  (no       (unknown)  (unknown)  Arrhythmia  (units    (unk

nown)



                    date)                                   unknown)  

 

           (unknown)  (no       (unknown)  (unknown)  Assessment and  (units    

(unknown)



                    date)                         Plan      unknown)  

 

           (unknown)  (no       (unknown)  (unknown)  Attending Dr:  (units    (

unknown)



                    date)                         Julieta Au unknown)  



                                                  MD                  

 

           (unknown)  (no       (unknown)  (unknown)  Libra presents  (units   

 (unknown)



                    date)                         today for routine unknown)  



                                                  OB f/u at 20w5d.           



                                                  She reports good           



                                                  fetal               

 

           (unknown)  (no       (unknown)  (unknown)  BMI 28.8  (units    (unkno

wn)



                    date)                                   unknown)  

 

           (unknown)  (no       (unknown)  (unknown)  /62  (units    (unkn

own)



                    date)                                   unknown)  

 

           (unknown)  (no       (unknown)  (unknown)  Birth     (units    (unkno

wn)



                    date)                         Plan/Preferences unknown)  

 

           (unknown)  (no       (unknown)  (unknown)  Birth Planning  (units    

(unknown)



                    date)                                   unknown)  

 

           (unknown)  (no       (unknown)  (unknown)  Blood Pressure  (units    

(unknown)



                    date)                         Location Rt unknown)  



                                                  brachial            

 

           (unknown)  (no       (unknown)  (unknown)  Blood     (units    (unkno

wn)



                    date)                         transfusions?: unknown)  



                                                  yes (Never had           



                                                  but would accept)           

 

           (unknown)  (no       (unknown)  (unknown)  Breastfeeding:  (units    

(unknown)



                    date)                         discussed unknown)  

 

           (unknown)  (no       (unknown)  (unknown)  Chicken pox  (units    (un

known)



                    date)                         ()   unknown)  

 

           (unknown)  (no       (unknown)  (unknown)  Childbirth  (units    (unk

nown)



                    date)                         Classes:  unknown)  



                                                  discussed           

 

           (unknown)  (no       (unknown)  (unknown)  Conceived  (units    (unkn

own)



                    date)                         naturally this unknown)  



                                                  pregnancy           

 

           (unknown)  (no       (unknown)  (unknown)  Confirmed  (units    (unkn

own)



                    date)                         23] unknown)  

 

           (unknown)  (no       (unknown)  (unknown)  Current Estimate  (units  

  (unknown)



                    date)                         23  unknown)  



                                                  Ultrasound #2 31w           



                                                  6d                  

 

           (unknown)  (no       (unknown)  (unknown)  Current   (units    (unkno

wn)



                    date)                         Pregnancy History unknown)  

 

           (unknown)  (no       (unknown)  (unknown)  DNA       (units    (unkno

wn)



                    date)                                   unknown)  

 

           (unknown)  (no       (unknown)  (unknown)  : 1995  (units   

 (unknown)



                    date)                         Acct:[id number] unknown)  

 

           (unknown)  (no       (unknown)  (unknown)  Date of positive  (units  

  (unknown)



                    date)                         home pregnancy unknown)  



                                                  test: 08/15/22           

 

           (unknown)  (no       (unknown)  (unknown)  Date      (units    (unkno

wn)



                    date)                                   unknown)  

 

           (unknown)  (no       (unknown)  (unknown) Denies Congenital  (units  

  (unknown)



                    date)                         Heart Defect, unknown)  



                                                  Denies Down           



                                                  Syndrome, Denies           



                                                  Muscular            



                                                  Dystrophy,           

 

           (unknown)  (no       (unknown)  (unknown)  Denies Maternal  (units   

 (unknown)



                    date)                         Metabolic unknown)  



                                                  Disorder (EG,TYPE           



                                                  1 Diabetes, PKU),           



                                                  Denies Patient or           

 

           (unknown)  (no       (unknown)  (unknown)  Denies Neural  (units    (

unknown)



                    date)                         Tube Defect unknown)  



                                                  (Meningomyelocele           



                                                  , Spina Bifida,           



                                                  or Anencephaly),           

 

           (unknown)  (no       (unknown)  (unknown)  Denies Sickle  (units    (

unknown)



                    date)                         Cell Disease or unknown)  



                                                  Trait (),           



                                                  Denies Hemophilia           



                                                  or other blood           

 

           (unknown)  (no       (unknown)  (unknown)  Denies [place]  (units  

  (unknown)



                    date)                         (Ashkenazi unknown)  



                                                  [employer], [place],           



                                                  [place]),           



                                                  Denies [place]           

 

           (unknown)  (no       (unknown)  (unknown)  Depression  (units    (unk

nown)



                    date)                         (-2016)   unknown)  

 

           (unknown)  (no       (unknown)  (unknown)  Depression:  (units    (un

known)



                    date)                         discussed unknown)  

 

           (unknown)  (no       (unknown)  (unknown)  Dept at   (units    (unkno

wn)



                    date)                         [phone]. unknown)  

 

           (unknown)  (no       (unknown)  (unknown)  Diet and  (units    (unkno

wn)



                    date)                         Exercise  unknown)  

 

           (unknown)  (no       (unknown)  (unknown)  Disease   (units    (unkno

wn)



                    date)                         (Ashkenazi unknown)  



                                                  Advent), Denies           



                                                  Familial            



                                                  Dysautonomia           



                                                  (Ashkenazi           



                                                  Advent),            

 

           (unknown)  (no       (unknown)  (unknown)  Documented By:  (units    

(unknown)



                    date)                         Julieta Au unknown)  



                                                  MD 23 1521           

 

           (unknown)  (no       (unknown)  (unknown)  [employer]     (units    (unkno

wn)



                    date)                                   unknown)  

 

           (unknown)  (no       (unknown)  (unknown)  MEHNAZ Calculator  (units    

(unknown)



                    date)                                   unknown)  

 

           (unknown)  (no       (unknown)  (unknown)  EGA Weight BP  (units    (

unknown)



                    date)                         UGlucose  unknown)  

 

           (unknown)  (no       (unknown)  (unknown)  Eczema (-2000)  (units    

(unknown)



                    date)                                   unknown)  

 

           (unknown)  (no       (unknown)  (unknown)  Estimated  (units    (unkn

own)



                    date)                         Delivery Date unknown)  



                                                  Method Current           

 

           (unknown)  (no       (unknown)  (unknown)  Exercise and  (units    (u

nknown)



                    date)                         activity, unknown)  



                                                  work/environmenta           



                                                  l/hazards, Sexual           



                                                  activity, X-ray           

 

           (unknown)  (no       (unknown)  (unknown)  F/u in 4 wks.  (units    (

unknown)



                    date)                                   unknown)  

 

           (unknown)  (no       (unknown)  (unknown)  Family History  (units    

(unknown)



                    date)                         (Updated 09/10/22 unknown)  



                                                  @ 21:49 by Fatoumata Flores)             

 

           (unknown)  (no       (unknown)  (unknown)  Family/Other  (units    (u

nknown)



                    date)                         Multiple  unknown)  



                                                  sclerosis           

 

           (unknown)  (no       (unknown)  (unknown)  Father    (units    (unkno

wn)



                    date)                         Hypertension unknown)  

 

           (unknown)  (no       (unknown)  (unknown)  Father of Baby:  (units   

 (unknown)



                    date)                         same      unknown)  

 

           (unknown)  (no       (unknown)  (unknown)  [facility]  (units   

 (unknown)



                    date)                         [facility] unknown)  

 

           (unknown)  (no       (unknown)  (unknown)  First Trimester  (units   

 (unknown)



                    date)                         Education unknown)  



                                                  Checklist           

 

           (unknown)  (no       (unknown)  (unknown)  Foot pain  (units    (unkn

own)



                    date)                         (-)   unknown)  

 

           (unknown)  (no       (unknown)  (unknown)   (SAB  (units    (unkn

own)



                    date)                         x7),Fertility Tx, unknown)  



                                                  meds only,           



                                                  started on baby           



                                                  aspirin             



                                                  10/11/2022           

 

           (unknown)  (no       (unknown)  (unknown)  GDM, on   (units    (unkno

wn)



                    date)                         Metformin 500mg unknown)  



                                                  BID                 

 

           (unknown)  (no       (unknown)  (unknown)  Genetic   (units    (unkno

wn)



                    date)                         Screening + unknown)  



                                                  Counseling           

 

           (unknown)  (no       (unknown)  (unknown)  Genetic   (units    (unkno

wn)



                    date)                         Screening unknown)  

 

           (unknown)  (no       (unknown)  (unknown)  Grandfather  (units    (un

known)



                    date)                          Cancer unknown)  

 

           (unknown)  (no       (unknown)  (unknown)  Grandfather  (units    (un

known)



                    date)                          Stroke unknown)  

 

           (unknown)  (no       (unknown)  (unknown)  Grandmother  (units    (un

known)



                    date)                          History unknown)  



                                                  of heart disease           

 

           (unknown)  (no       (unknown)  (unknown)  Grandmother  (units    (un

known)



                    date)                          Multiple unknown)  



                                                  sclerosis           

 

           (unknown)  (no       (unknown)  (unknown)   8  (units    (unkn

own)



                    date)                         Multiple births 0 unknown)  

 

           (unknown)  (no       (unknown)  (unknown)  HIV risk  (units    (unkno

wn)



                    date)                         evaluation: low unknown)  



                                                  risk                

 

           (unknown)  (no       (unknown)  (unknown)  [facility]  (units    (

unknown)



                    date)                         Education unknown)  

 

           (unknown)  (no       (unknown)  (unknown)  Health center  (units    (

unknown)



                    date)                         information: unknown)  



                                                  nature of           



                                                  practice            



                                                  discussed,           



                                                  prenatal            



                                                  personnel           

 

           (unknown)  (no       (unknown)  (unknown)  Height 5 ft 6 in  (units  

  (unknown)



                    date)                                   unknown)  

 

           (unknown)  (no       (unknown)  (unknown)  Hepatitis C risk  (units  

  (unknown)



                    date)                         evaluation: low unknown)  



                                                  risk                

 

           (unknown)  (no       (unknown)  (unknown)  History of  (units    (unk

nown)



                    date)                         Hepatitis B: No unknown)  

 

           (unknown)  (no       (unknown)  (unknown)  History of  (units    (unk

nown)



                    date)                         Hepatitis C: No unknown)  

 

           (unknown)  (no       (unknown)  (unknown)  History of  (units    (unk

nown)



                    date)                         recurrent unknown)  



                                                  miscarriages           

 

           (unknown)  (no       (unknown)  (unknown)  Hospital: [facility]  (units    (u

nknown)



                    date)                                   unknown)  

 

           (unknown)  (no       (unknown)  (unknown)  [facility]  (units    (u

nknown)



                    date)                         Chorea, Denies unknown)  



                                                  Other inherited           



                                                  genetic or           



                                                  chromosomal           



                                                  disorder,           

 

           (unknown)  (no       (unknown)  (unknown)  , [place]  (units    (

unknown)



                    date)                         [place]  unknown)  

 

           (unknown)  (no       (unknown)  (unknown)  Hx #   (units    (u

nknown)



                    date)                         Pregnancies 0 unknown)  



                                                  Elective            



                                                  abortions 0           

 

           (unknown)  (no       (unknown)  (unknown)  Hx # Term  (units    (unkn

own)



                    date)                         Pregnancies 0 unknown)  



                                                  Ectopic             



                                                  pregnancies 0           

 

           (unknown)  (no       (unknown)  (unknown)  Infant will be  (units    

(unknown)



                    date)                         adopted?: no unknown)  

 

           (unknown)  (no       (unknown)  (unknown)  Infection  (units    (unkn

own)



                    date)                         History   unknown)  

 

           (unknown)  (no       (unknown)  (unknown)  Infectious  (units    (unk

nown)



                    date)                         Disease Education unknown)  

 

           (unknown)  (no       (unknown)  (unknown)  Infectious  (units    (unk

nown)



                    date)                         disease exposure: unknown)  



                                                  chicken pox           



                                                  immunity            



                                                  discussed,           



                                                  hepatitis risk           

 

           (unknown)  (no       (unknown)  (unknown)  Infertility  (units    (un

known)



                    date)                         ()   unknown)  

 

           (unknown)  (no       (unknown)  (unknown)  Initial Weight:  (units   

 (unknown)



                    date)                         155 lb    unknown)  

 

           (unknown)  (no       (unknown)  (unknown)  Initials  (units    (unkno

wn)



                    date)                                   unknown)  

 

           (unknown)  (no       (unknown)  (unknown)  Intake Clinical  (units   

 (unknown)



                    date)                         Staff     unknown)  

 

           (unknown)  (no       (unknown)  (unknown)  Intake Note:  (units    (u

nknown)



                    date)                                   unknown)  

 

           (unknown)  (no       (unknown)  (unknown)  Intake performed  (units  

  (unknown)



                    date)                         by:       unknown)  



                                                  Edelmira Montero           

 

           (unknown)  (no       (unknown)  (unknown)  Intake    (units    (unkno

wn)



                    date)                                   unknown)  

 

           (unknown)  (no       (unknown)  (unknown)  Irregular  (units    (unkn

own)



                    date)                         menstrual cycle unknown)  



                                                  ()             

 

           (unknown)  (no       (unknown)  (unknown)  LM        (units    (unkno

wn)



                    date)                                   unknown)  

 

           (unknown)  (no       (unknown)  (unknown)  Lipoma    (units    (unkno

wn)



                    date)                                   unknown)  

 

           (unknown)  (no       (unknown)  (unknown)  Live with  (units    (unkn

own)



                    date)                         someone with TB unknown)  



                                                  or exposed to TB:           



                                                  No                  

 

           (unknown)  (no       (unknown)  (unknown)  Loc: [facility]  (units    (unkno

wn)



                    date)                                   unknown)  

 

           (unknown)  (no       (unknown)  (unknown)  Marital status:  (units   

 (unknown)



                    date)                            unknown)  

 

           (unknown)  (no       (unknown)  (unknown)  Medical History  (units   

 (unknown)



                    date)                         (Updated 23 unknown)  



                                                  @ 16:18 by           



                                                  Julieta Au MD)                 

 

           (unknown)  (no       (unknown)  (unknown)  Medications  (units    (un

known)



                    date)                                   unknown)  

 

           (unknown)  (no       (unknown)  (unknown)  Mother Gastric  (units    

(unknown)



                    date)                         cancer    unknown)  

 

           (unknown)  (no       (unknown)  (unknown)  N No 142 13 N/A  (units   

 (unknown)



                    date)                         4wk       unknown)  

 

           (unknown)  (no       (unknown)  (unknown)  N No no 143 17  (units    

(unknown)



                    date)                         N/A absent AFP 4 unknown)  



                                                  wks                 

 

           (unknown)  (no       (unknown)  (unknown)  N No no 178 9  (units    (

unknown)



                    date)                         N/A absent unknown)  



                                                  long/closed AGA 9           

 

           (unknown)  (no       (unknown)  (unknown)  N Yes no 141 24  (units   

 (unknown)



                    date)                         N/A absent 4 wks unknown)  

 

           (unknown)  (no       (unknown)  (unknown)  N Yes no 142 20  (units   

 (unknown)



                    date)                         N/A absent AFP unknown)  



                                                  negative            

 

           (unknown)  (no       (unknown)  (unknown)  N Yes no 144 29  (units   

 (unknown)



                    date)                         Vertex absent AGA unknown)  



                                                  29w5d               

 

           (unknown)  (no       (unknown)  (unknown)  N Yes no 150 32  (units   

 (unknown)



                    date)                         Vertex absent 2 unknown)  



                                                  wks                 

 

           (unknown)  (no       (unknown)  (unknown)  NF        (units    (unkno

wn)



                    date)                                   unknown)  

 

           (unknown)  (no       (unknown)  (unknown)  Notes     (units    (unkno

wn)



                    date)                                   unknown)  

 

           (unknown)  (no       (unknown)  (unknown)  Number of Living  (units  

  (unknown)



                    date)                         Children 0 unknown)  

 

           (unknown)  (no       (unknown)  (unknown)  Number of  (units    (unkn

own)



                    date)                         fetuses:: Single unknown)  

 

           (unknown)  (no       (unknown)  (unknown)  Nutrition and  (units    (

unknown)



                    date)                         weight gain unknown)  



                                                  counseling:           



                                                  special diet:           



                                                  discussed           

 

           (unknown)  (no       (unknown)  (unknown)  OB Office Visit  (units   

 (unknown)



                    date)                                   unknown)  

 

           (unknown)  (no       (unknown)  (unknown)  OB Visit Log  (units    (u

nknown)



                    date)                                   unknown)  

 

           (unknown)  (no       (unknown)  (unknown)  OB check  (units    (unkno

wn)



                    date)                                   unknown)  

 

           (unknown)  (no       (unknown)  (unknown)  On U/S: AGA  (units    (un

known)



                    date)                         29w5d. 3#4oz unknown)  



                                                  66%ile. Nl AFV.           



                                                  Plan: Metformin           



                                                  500mg BID. F/U 3           

 

           (unknown)  (no       (unknown)  (unknown)  On birth control  (units  

  (unknown)



                    date)                         at conception?: unknown)  



                                                  No                  

 

           (unknown)  (no       (unknown)  (unknown)  Other Estimates  (units   

 (unknown)



                    date)                         23 LMP unknown)  



                                                  (Certain) 34w 0d           

 

           (unknown)  (no       (unknown)  (unknown)  Ovarian cyst  (units    (u

nknown)



                    date)                         ()   unknown)  

 

           (unknown)  (no       (unknown)  (unknown)  PCOS (polycystic  (units  

  (unknown)



                    date)                         ovarian syndrome) unknown)  

 

           (unknown)  (no       (unknown)  (unknown)  PFSH      (units    (unkno

wn)



                    date)                                   unknown)  

 

           (unknown)  (no       (unknown)  (unknown)  Pap performed?:  (units   

 (unknown)



                    date)                         No        unknown)  

 

           (unknown)  (no       (unknown)  (unknown)  Para 0    (units    (unkno

wn)



                    date)                         Spontaneous unknown)  



                                                  abortions 7           

 

           (unknown)  (no       (unknown)  (unknown)  Partner history  (units   

 (unknown)



                    date)                         of STD: denies hx unknown)  

 

           (unknown)  (no       (unknown)  (unknown)  Partner history  (units   

 (unknown)



                    date)                         of genital unknown)  



                                                  herpes: No           

 

           (unknown)  (no       (unknown)  (unknown)  Partner: Franklin  (units    (

unknown)



                    date)                         [place]  unknown)  

 

           (unknown)  (no       (unknown)  (unknown)  Patient comes in  (units  

  (unknown)



                    date)                         for follow-up OB unknown)  



                                                  visit. She had           



                                                  some pelvic pain           



                                                  that                

 

           (unknown)  (no       (unknown)  (unknown)  Patient presents  (units  

  (unknown)



                    date)                         for a new OB unknown)  



                                                  visit at 8 weeks           



                                                  gestation. She is           

 

           (unknown)  (no       (unknown)  (unknown)  Patient presents  (units  

  (unknown)



                    date)                         for a routine unknown)  



                                                  prenatal visit,           



                                                  accompanied by           



                                                  her .           

 

           (unknown)  (no       (unknown)  (unknown)  Patient's age 33  (units  

  (unknown)



                    date)                         years or older as unknown)  



                                                  of estimated date           



                                                  of delivery: No           

 

           (unknown)  (no       (unknown)  (unknown)  Patient:  (units    (unkno

wn)



                    date)                         Libra Prieto unknown)  



                                                  MR#: [id number]            

 

           (unknown)  (no       (unknown)  (unknown)  Pediatrician:  (units    (

unknown)



                    date)                         DOD Tum[place] vs unknown)  



                                                  [facility]           



                                                  [facility]           



                                                  [place]             

 

           (unknown)  (no       (unknown)  (unknown)  Personal history  (units  

  (unknown)



                    date)                         of STD: denies hx unknown)  

 

           (unknown)  (no       (unknown)  (unknown)  Personal history  (units  

  (unknown)



                    date)                         of genital unknown)  



                                                  herpes: No           

 

           (unknown)  (no       (unknown)  (unknown)  Plantar   (units    (unkno

wn)



                    date)                         fasciitis unknown)  

 

           (unknown)  (no       (unknown)  (unknown)  Position Sitting  (units  

  (unknown)



                    date)                                   unknown)  

 

           (unknown)  (no       (unknown)  (unknown)  Postpartum  (units    (unk

nown)



                    date)                         family    unknown)  



                                                  planning/Tubal           



                                                  sterilization:           



                                                  further             



                                                  discussion needed           

 

           (unknown)  (no       (unknown)  (unknown)  Pregnancy  (units    (unkn

own)



                    date)                         History   unknown)  

 

           (unknown)  (no       (unknown)  (unknown)  Pregnancy type::  (units  

  (unknown)



                    date)                         Other Normal unknown)  



                                                  Pregnancy           

 

           (unknown)  (no       (unknown)  (unknown)  Prenatal  (units    (unkno

wn)



                    date)                         Education unknown)  

 

           (unknown)  (no       (unknown)  (unknown)  Prenatal Initial  (units  

  (unknown)



                    date)                         Assessment unknown)  

 

           (unknown)  (no       (unknown)  (unknown)  Prenatal  (units    (unkno

wn)



                    date)                         Specific  unknown)  



                                                  Issues/Plans           

 

           (unknown)  (no       (unknown)  (unknown)  Prenatal  (units    (unkno

wn)



                    date)                         Testing:  unknown)  



                                                  discussed           

 

           (unknown)  (no       (unknown)  (unknown)  Prenatal Visit  (units    

(unknown)



                    date)                                   unknown)  

 

           (unknown)  (no       (unknown)  (unknown)  Prenatal  (units    (unkno

wn)



                    date)                         education packet: unknown)  



                                                  Child birth           



                                                  education/plan,           



                                                  Pregnancy           



                                                  symptoms,           

 

           (unknown)  (no       (unknown)  (unknown)  Primary Care  (units    (u

nknown)



                    date)                         Provider: BASIM Escobar unknownWilson Francis              

 

           (unknown)  (no       (unknown)  (unknown)  Primary Ob  (units    (unk

nown)



                    date)                         Provider: unknown)  



                                                  Julieta Au           

 

           (unknown)  (no       (unknown)  (unknown)  Prior     (units    (unkno

wn)



                    date)                         GBS-Infected unknown)  



                                                  child: No           

 

           (unknown)  (no       (unknown)  (unknown)  Providers  (units    (unkn

own)



                    date)                                   unknown)  

 

           (unknown)  (no       (unknown)  (unknown)  Pt presents for  (units   

 (unknown)



                    date)                         a PNV at 16+6 unknown)  



                                                  wks. No FM. No           



                                                  LOF/VB. Rec'd flu           



                                                  shot                

 

           (unknown)  (no       (unknown)  (unknown)  Pt presents for  (units   

 (unknown)



                    date)                         a routine PNV at unknown)  



                                                  28 +6 wks           



                                                  gestation.           



                                                  Abnormal 3 hr           



                                                  GTT.                

 

           (unknown)  (no       (unknown)  (unknown)  Rash or viral  (units    (

unknown)



                    date)                         illness since unknown)  



                                                  last menstrual           



                                                  period: No           

 

           (unknown)  (no       (unknown)  (unknown)  Rash      (units    (unkno

wn)



                    date)                                   unknown)  

 

           (unknown)  (no       (unknown)  (unknown)  Reason For Visit  (units  

  (unknown)



                    date)                                   unknown)  

 

           (unknown)  (no       (unknown)  (unknown)  Recent travel  (units    (

unknown)



                    date)                         outside of unknown)  



                                                  country?: No           

 

           (unknown)  (no       (unknown)  (unknown)  Recurrent  (units    (unkn

own)



                    date)                         pregnancy loss or unknown)  



                                                  a stillbirth: Yes           

 

           (unknown)  (no       (unknown)  (unknown)  Reports over the  (units  

  (unknown)



                    date)                         counter   unknown)  



                                                  medications           



                                                  (diclofenac),           



                                                  Denies alcohol,           



                                                  Denies              

 

           (unknown)  (no       (unknown)  (unknown)  Safety    (units    (unkno

wn)



                    date)                                   unknown)  

 

           (unknown)  (no       (unknown)  (unknown)  Second Trimester  (units  

  (unknown)



                    date)                         Education unknown)  



                                                  Checklist           

 

           (unknown)  (no       (unknown)  (unknown)  Selecting a  (units    (un

known)



                    date)                          care unknown)  



                                                  provider: further           



                                                  discussion needed           

 

           (unknown)  (no       (unknown)  (unknown)  She is at 22  (units    (u

nknown)



                    date)                         weeks 6 days. She unknown)  



                                                  has felt good           



                                                  fetal movement.           



                                                  She says it is           

 

           (unknown)  (no       (unknown)  (unknown)  Shingles  (units    (unkno

wn)



                    date)                                   unknown)  

 

           (unknown)  (no       (unknown)  (unknown)  Signed By:  (units    (unk

nown)



                    date)                                   unknown)  

 

           (unknown)  (no       (unknown)  (unknown)  Signs and  (units    (unkn

own)



                    date)                         symptoms of unknown)  



                                                   labor:           



                                                  discussed           

 

           (unknown)  (no       (unknown)  (unknown)  Smoking Status:  (units   

 (unknown)



                    date)                         Never smoker unknown)  

 

           (unknown)  (no       (unknown)  (unknown)  Social History  (units    

(unknown)



                    date)                                   unknown)  

 

           (unknown)  (no       (unknown)  (unknown)  Support   (units    (unkno

wn)



                    date)                         Person(s):: Franklin unknown)  

 

           (unknown)  (no       (unknown)  (unknown)  Surgical History  (units  

  (unknown)



                    date)                         (Updated 09/10/22 unknown)  



                                                  @ 21:46 by Fatoumata Flores)             

 

           (unknown)  (no       (unknown)  (unknown)  Surrogate  (units    (unkn

own)



                    date)                         pregnancy?: no unknown)  

 

           (unknown)  (no       (unknown)  (unknown)  Symptoms since  (units    

(unknown)



                    date)                         LMP: Reports unknown)  



                                                  amenorrhea,           



                                                  nausea, fatigue,           



                                                  breast              



                                                  tenderness,           

 

           (unknown)  (no       (unknown)  (unknown)  Teratogen  (units    (unkn

own)



                    date)                         Exposures since unknown)  



                                                  LMP/Conception:           



                                                  Denies              



                                                  prescription           



                                                  medications,           

 

           (unknown)  (no       (unknown)  (unknown)  Testing   (units    (unkno

wn)



                    date)                         Education unknown)  

 

           (unknown)  (no       (unknown)  (unknown)  Testing   (units    (unkno

wn)



                    date)                         education unknown)  



                                                  completed: group           



                                                  B strep, Spina           



                                                  bifida testing           



                                                  and Cell Free           

 

           (unknown)  (no       (unknown)  (unknown)  This note may  (units    (

unknown)



                    date)                         have been all or unknown)  



                                                  partially           



                                                  generated using           



                                                  voice recognition           

 

           (unknown)  (no       (unknown)  (unknown)  Tobacco +  (units    (unkn

own)



                    date)                         Substance Use unknown)  

 

           (unknown)  (no       (unknown)  (unknown)  Tobacco Status  (units    

(unknown)



                    date)                                   unknown)  

 

           (unknown)  (no       (unknown)  (unknown)  Trimester:: 3rd  (units   

 (unknown)



                    date)                         Trimester unknown)  



                                                  (28wks-Del)           

 

           (unknown)  (no       (unknown)  (unknown)  Tumor (-10/2012)  (units  

  (unknown)



                    date)                                   unknown)  

 

           (unknown)  (no       (unknown)  (unknown)  Type(s) of  (units    (unk

nown)



                    date)                         exercise: unknown)  



                                                  bicycling           



                                                  (stationary           



                                                  bike), regular           



                                                  exercise, weight           

 

           (unknown)  (no       (unknown)  (unknown) UProtein Movement  (units  

  (unknown)



                    date)                         PreLabor FHR Fndl unknown)  



                                                  Ht Pres Edema           



                                                  Cerv Exam           



                                                  US/Comment Next           



                                                  Appt                

 

           (unknown)  (no       (unknown)  (unknown)  Ultrasound  (units    (unk

nown)



                    date)                         performed?: No unknown)  

 

           (unknown)  (no       (unknown)  (unknown)  Varicella/chicke  (units  

  (unknown)



                    date)                         n pox status: unknown)  



                                                  previous disease           

 

           (unknown)  (no       (unknown)  (unknown)  Visit Date:  (units    (un

known)



                    date)                         23 Last unknown)  



                                                  Updated by:           



                                                  Julieta Au MD                  

 

           (unknown)  (no       (unknown)  (unknown)  Visit Date:  (units    (un

known)



                    date)                         23 Last unknown)  



                                                  Updated by:           



                                                  Julieta Au MD                  

 

           (unknown)  (no       (unknown)  (unknown)  Visit Date:  (units    (un

known)



                    date)                         22 Last unknown)  



                                                  Updated by:           



                                                  Julieta Au MD                  

 

           (unknown)  (no       (unknown)  (unknown)  Visit Date:  (units    (un

known)



                    date)                         10/11/22 Last unknown)  



                                                  Updated by:           



                                                  Fanny Baker MD                  

 

           (unknown)  (no       (unknown)  (unknown)  Visit Date:  (units    (un

known)



                    date)                         22 Last unknown)  



                                                  Updated by:           



                                                  Julieta Au MD                  

 

           (unknown)  (no       (unknown)  (unknown)  Visit Date:  (units    (un

known)



                    date)                         22 Last unknown)  



                                                  Updated by: Berta Salinas P.A-C           

 

           (unknown)  (no       (unknown)  (unknown)  Visit Reasons:  (units    

(unknown)



                    date)                         OB        unknown)  

 

           (unknown)  (no       (unknown)  (unknown)  Vitals    (units    (unkno

wn)



                    date)                                   unknown)  

 

           (unknown)  (no       (unknown)  (unknown)  Vitamins and  (units    (u

nknown)



                    date)                         iron, Diet and unknown)  



                                                  weight gain, Fish           



                                                  and mercury           



                                                  intake, Caffeine           



                                                  use,                

 

           (unknown)  (no       (unknown)  (unknown)  WG        (units    (unkno

wn)



                    date)                                   unknown)  

 

           (unknown)  (no       (unknown)  (unknown)  Weeks     (units    (unkno

wn)



                    date)                         gestation:: 31 unknown)  

 

           (unknown)  (no       (unknown)  (unknown)  Weight 179 lb  (units    (

unknown)



                    date)                                   unknown)  

 

           (unknown)  (no       (unknown)  (unknown)  [place] teeth  (units    (u

nknown)



                    date)                         extracted unknown)  

 

           (unknown)  (no       (unknown)  (unknown)  Zika virus  (units    (unk

nown)



                    date)                         exposure: No unknown)  

 

           (unknown)  (no       (unknown)  (unknown)  accompanied by  (units    

(unknown)



                    date)                         her . She unknown)  



                                                  went through           



                                                  fertility           



                                                  treatments with           

 

           (unknown)  (no       (unknown)  (unknown)  alcohol intake:  (units   

 (unknown)



                    date)                         never     unknown)  

 

           (unknown)  (no       (unknown)  (unknown)  anterior  (units    (unkno

wn)



                    date)                         placenta. Routine unknown)  



                                                  precautions           



                                                  reviewed with the           



                                                  patient. Due to           



                                                  1st                 

 

           (unknown)  (no       (unknown)  (unknown)  anyone in either  (units  

  (unknown)



                    date)                         family with: unknown)  

 

           (unknown)  (no       (unknown)  (unknown)  baby's father  (units    (

unknown)



                    date)                         had a child with unknown)  



                                                  birth defects not           



                                                  listed above and           



                                                  Denies Other           

 

           (unknown)  (no       (unknown)  (unknown)  back pain.  (units    (unk

nown)



                    date)                         Advised   unknown)  



                                                  stretching, belly           



                                                  band, and PT if           



                                                  not improving.           



                                                  AFP was             

 

           (unknown)  (no       (unknown)  (unknown)  blood sugar  (units    (un

known)



                    date)                         diagnostic (Blood unknown)  



                                                  Glucose Test           



                                                  strips) #100 ea           



                                                  23 [Rx           

 

           (unknown)  (no       (unknown)  (unknown)  blood-glucose  (units    (

unknown)



                    date)                         meter #1 ea unknown)  



                                                  23 [Rx           



                                                  Confirmed           



                                                  23]           

 

           (unknown)  (no       (unknown)  (unknown)  by ultrasound is  (units  

  (unknown)



                    date)                         normal with good unknown)  



                                                  fetal movement,           



                                                  normal appearing           



                                                  fluid,              

 

           (unknown)  (no       (unknown)  (unknown)  caffeine: Yes  (units    (

unknown)



                    date)                         (1-2 cups unknown)  



                                                  tea/day)            

 

           (unknown)  (no       (unknown)  (unknown)  carbon monox  (units    (u

nknown)



                    date)                         detector in home: unknown)  



                                                  Yes                 

 

           (unknown)  (no       (unknown)  (unknown)  cfDNA normal  (units    (u

nknown)



                    date)                         female, AFP unknown)  



                                                  negative            

 

           (unknown)  (no       (unknown)  (unknown)  consistent with  (units   

 (unknown)



                    date)                         9 weeks 0 days. unknown)  



                                                  Yolk sac visible.           



                                                  Fetal heart rate           



                                                  178 beats           

 

           (unknown)  (no       (unknown)  (unknown)  current   (units    (unkno

wn)



                    date)                         occupational unknown)  



                                                  exposures/hazards           



                                                  : No                

 

           (unknown)  (no       (unknown)  (unknown)  daily servings  (units    

(unknown)



                    date)                         fruits/ve-4 unknown)  

 

           (unknown)  (no       (unknown)  (unknown)  described, visit  (units  

  (unknown)



                    date)                         schedule  unknown)  



                                                  reviewed,           



                                                  ultrasounds           



                                                  policy reviewed,           



                                                  coverage 24           

 

           (unknown)  (no       (unknown)  (unknown)  developing a  (units    (u

nknown)



                    date)                         pattern. No unknown)  



                                                  leakage of fluid           



                                                  or vaginal           



                                                  bleeding. No           



                                                  contractions           

 

           (unknown)  (no       (unknown)  (unknown)  discussed,  (units    (unk

nown)



                    date)                         tuberculosis unknown)  



                                                  exposure            



                                                  discussed, CMV           



                                                  discussed,           



                                                  Toxoplasmosis           

 

           (unknown)  (no       (unknown)  (unknown)  disorders,  (units    (unk

nown)



                    date)                         Denies Cystic unknown)  



                                                  Fibrosis, Denies           



                                                  Mental              



                                                  Retardation/Autis           



                                                  m, Denies           

 

           (unknown)  (no       (unknown)  (unknown)  do you feel safe  (units  

  (unknown)



                    date)                         at home: Yes unknown)  

 

           (unknown)  (no       (unknown)  (unknown)  during the past  (units   

 (unknown)



                    date)                         year weight has: unknown)  



                                                  remained stable           

 

           (unknown)  (no       (unknown)  (unknown)  education level:  (units  

  (unknown)



                    date)                         college   unknown)  



                                                  (Associate's           



                                                  degree)             

 

           (unknown)  (no       (unknown)  (unknown)  exposure,  (units    (unkn

own)



                    date)                         Medication use, unknown)  



                                                  Sauna/hot tub           



                                                  use, Dental care,           



                                                  Travel and           



                                                  Influenza           

 

           (unknown)  (no       (unknown)  (unknown)  fire      (units    (unkno

wn)



                    date)                         extinguisher in unknown)  



                                                  home: Yes           

 

           (unknown)  (no       (unknown)  (unknown)  firearms in  (units    (un

known)



                    date)                         home: Yes unknown)  



                                                  firearms unloaded           



                                                  and locked: Yes           

 

           (unknown)  (no       (unknown)  (unknown)  frequency: 5-6  (units    

(unknown)



                    date)                         times per week unknown)  

 

           (unknown)  (no       (unknown)  (unknown)  gestational sac  (units   

 (unknown)



                    date)                         with a fetus with unknown)  



                                                  a crown-rump           



                                                  length measuring           



                                                  2.29 cm             

 

           (unknown)  (no       (unknown)  (unknown)  have occurred.  (units    

(unknown)



                    date)                         If there are any unknown)  



                                                  questions, please           



                                                  contact the           



                                                  Medical Records           

 

           (unknown)  (no       (unknown)  (unknown)  hours a day and  (units   

 (unknown)



                    date)                         participation of unknown)  



                                                  father in           



                                                  prenatal care and           



                                                  office visits           

 

           (unknown)  (no       (unknown)  (unknown)  household  (units    (unkn

own)



                    date)                         members: spouse unknown)  



                                                  and family           



                                                  (father and           



                                                  father-in-law)           

 

           (unknown)  (no       (unknown)  (unknown)  housing: house  (units    

(unknown)



                    date)                                   unknown)  

 

           (unknown)  (no       (unknown)  (unknown)  illicit drugs  (units    (

unknown)



                    date)                         and Denies other unknown)  

 

           (unknown)  (no       (unknown)  (unknown)  kag       (units    (unkno

wn)



                    date)                                   unknown)  

 

           (unknown)  (no       (unknown)  (unknown)  lancets #100 ea  (units   

 (unknown)



                    date)                         23 [Rx unknown)  



                                                  Confirmed           



                                                  23]           

 

           (unknown)  (no       (unknown)  (unknown)  last visit.  (units    (un

known)



                    date)                         Plan: AFP today. unknown)  



                                                  20 wk u/s           



                                                  reviewed. F/U 4           



                                                  wks. Warning           



                                                  signs               

 

           (unknown)  (no       (unknown)  (unknown)  lifting and  (units    (un

known)



                    date)                         other (hiking) unknown)  

 

           (unknown)  (no       (unknown)  (unknown)  lives     (units    (unkno

wn)



                    date)                         independently: unknown)  



                                                  Yes                 

 

           (unknown)  (no       (unknown)  (unknown)  marital status:  (units   

 (unknown)



                    date)                            unknown)  

 

           (unknown)  (no       (unknown)  (unknown)  may occur.  (units    (unk

nown)



                    date)                         Occasional unknown)  



                                                  wrong-word or           



                                                  'sound-alike'           



                                                  substitutions may           



                                                  have                

 

           (unknown)  (no       (unknown)  (unknown)  medication only.  (units  

  (unknown)



                    date)                         Had 7     unknown)  



                                                  miscarriages. No           



                                                  bleeding with           



                                                  this pregnancy.           



                                                  This 1              

 

           (unknown)  (no       (unknown)  (unknown)  metformin 500 mg  (units  

  (unknown)



                    date)                         tablet 500 mg PO unknown)  



                                                  BID #60 tabs           



                                                  23 [Rx           



                                                  Confirmed           



                                                  23]           

 

           (unknown)  (no       (unknown)  (unknown)  movement and  (units    (u

nknown)



                    date)                         denies VB, LOF, unknown)  



                                                  cramping and           



                                                  regular             



                                                  contractions. Pt           



                                                  reports low           

 

           (unknown)  (no       (unknown)  (unknown)  negative.  (units    (unkn

own)



                    date)                         Anatomy US unknown)  



                                                  scheduled for           



                                                  later today.           



                                                  Warning             



                                                  precautions           



                                                  reviewed.           

 

           (unknown)  (no       (unknown)  (unknown)  nickel Allergy  (units    

(unknown)



                    date)                         (Mild, Verified unknown)  



                                                  23 15:27)           

 

           (unknown)  (no       (unknown)  (unknown)  number of  (units    (unkn

own)



                    date)                         children: 0 unknown)  

 

           (unknown)  (no       (unknown)  (unknown)  occupational  (units    (u

nknown)



                    date)                         status: employed unknown)  



                                                  (active duty           



                                                  avionics            



                                                  ,           



                                                  [employer] job            

 

           (unknown)  (no       (unknown)  (unknown)  occurred due to  (units   

 (unknown)



                    date)                         the inherent unknown)  



                                                  limitations of           



                                                  voice recognition           



                                                  software. Please           

 

           (unknown)  (no       (unknown)  (unknown)  or cramping.  (units    (u

nknown)



                    date)                         Plan: 1 hour unknown)  



                                                  glucose ordered.           



                                                  Follow-up in 4           



                                                  weeks. Warning           

 

           (unknown)  (no       (unknown)  (unknown)  per minute.  (units    (un

known)



                    date)                         Normal ovaries unknown)  



                                                  bilaterally.           



                                                  Plan: Follow-up           



                                                  in 4 weeks.           



                                                  Warning             

 

           (unknown)  (no       (unknown)  (unknown)  pets and  (units    (unkno

wn)



                    date)                         animals: Yes (1 unknown)  



                                                  large dog,           



                                                  chickens)           

 

           (unknown)  (no       (unknown)  (unknown)  precautions,  (units    (u

nknown)



                    date)                         Listeriosis unknown)  



                                                  prevention and           



                                                  Rubella             



                                                  Immunization           

 

           (unknown)  (no       (unknown)  (unknown)  preeclampsia.  (units    (

unknown)



                    date)                                   unknown)  

 

           (unknown)  (no       (unknown)  (unknown)  pregnancy  (units    (unkn

own)



                    date)                         discussed unknown)  



                                                  starting baby           



                                                  aspirin per day           



                                                  to decrease her           



                                                  risk for            

 

           (unknown)  (no       (unknown)  (unknown)  prenat.vits,nan,  (units  

  (unknown)



                    date)                         min-iron-folic 1 unknown)  



                                                  tab PO DAILY           



                                                  22 [History           



                                                  Confirmed           

 

           (unknown)  (no       (unknown)  (unknown)  read the note  (units    (

unknown)



                    date)                         carefully and unknown)  



                                                  recognize, using           



                                                  context, where           



                                                  these               



                                                  substitutions           

 

           (unknown)  (no       (unknown)  (unknown)  reviewed.  (units    (unkn

own)



                    date)                                   unknown)  

 

           (unknown)  (no       (unknown)  (unknown)  seatbelt use:  (units    (

unknown)



                    date)                         always    unknown)  

 

           (unknown)  (no       (unknown)  (unknown)  second hand  (units    (un

known)



                    date)                         exposure: Yes unknown)  



                                                  (father-in-law           



                                                  smokes outside           



                                                  the house)           

 

           (unknown)  (no       (unknown)  (unknown)  signs reviewed.  (units   

 (unknown)



                    date)                         cfDNA ordered. unknown)  

 

           (unknown)  (no       (unknown)  (unknown)  signs reviewed.  (units   

 (unknown)



                    date)                                   unknown)  

 

           (unknown)  (no       (unknown)  (unknown)  software.  (units    (unkn

own)



                    date)                         Although every unknown)  



                                                  effort is made to           



                                                  edit content,           



                                                  transcription           



                                                  errors              

 

           (unknown)  (no       (unknown)  (unknown)  special madi  (units    (

unknown)



                    date)                         needs: No unknown)  

 

           (unknown)  (no       (unknown)  (unknown)  substance use  (units    (

unknown)



                    date)                         type: does not unknown)  



                                                  use                 

 

           (unknown)  (no       (unknown)  (unknown)  travel history:  (units   

 (unknown)



                    date)                         over 6 months ago unknown)  

 

           (unknown)  (no       (unknown)  (unknown)  urinary   (units    (unkno

wn)



                    date)                         frequency and unknown)  



                                                  irritability           

 

           (unknown)  (no       (unknown)  (unknown)  vaccine (Annual  (units   

 (unknown)



                    date)                         flu, Phizer Covid unknown)  



                                                  x2)                 

 

           (unknown)  (no       (unknown)  (unknown)  w0d 4 wks  (units    (unkn

own)



                    date)                                   unknown)  

 

           (unknown)  (no       (unknown)  (unknown)  was not using  (units    (

unknown)



                    date)                         any infertility unknown)  



                                                  medications.           



                                                  Ultrasound: An           



                                                  intrauterine           

 

           (unknown)  (no       (unknown)  (unknown)  water heater  (units    (u

nknown)



                    date)                         temp set < 120 unknown)  



                                                  deg: Yes            

 

           (unknown)  (no       (unknown)  (unknown)  well-balanced  (units    (

unknown)



                    date)                         diet: daily or unknown)  



                                                  most days           

 

           (unknown)  (no       (unknown)  (unknown)  went away with  (units    

(unknown)



                    date)                         laying down. No unknown)  



                                                  vaginal bleeding.           



                                                  No fevers. Fetal           



                                                  heart tone           

 

           (unknown)  (no       (unknown)  (unknown)  while pregnant)  (units   

 (unknown)



                    date)                                   unknown)  

 

           (unknown)  (no       (unknown)  (unknown)  wks. Warning  (units    (u

nknown)



                    date)                         signs for PTL unknown)  



                                                  reviewed.           

 

           (unknown)  (no       (unknown)  (unknown)  working smoke  (units    (

unknown)



                    date)                         detector in home: unknown)  



                                                  Yes                 









                                         Result panel 22









           (unknown)  (no       (unknown)  (unknown)  (no value)  (units    (unk

nown)



                    date)                                   unknown)  

 

           (unknown)  (no       (unknown)  (unknown)  (+12 lb) 120/58  (units   

 (unknown)



                    date)                         N         unknown)  

 

           (unknown)  (no       (unknown)  (unknown)  (+13 lb) 104/54  (units   

 (unknown)



                    date)                         N         unknown)  

 

           (unknown)  (no       (unknown)  (unknown)  (+18 lb) 122/60  (units   

 (unknown)



                    date)                         N         unknown)  

 

           (unknown)  (no       (unknown)  (unknown)  (+22 lb) 110/62  (units   

 (unknown)



                    date)                         N         unknown)  

 

           (unknown)  (no       (unknown)  (unknown)  (+24 lb) 128/62  (units   

 (unknown)



                    date)                         N         unknown)  

 

           (unknown)  (no       (unknown)  (unknown)  (+7 lb) 128/66 N  (units  

  (unknown)



                    date)                                   unknown)  

 

           (unknown)  (no       (unknown)  (unknown)  (+8 lb) 122/68 N  (units  

  (unknown)



                    date)                                   unknown)  

 

           (unknown)  (no       (unknown)  (unknown)  **Genetic  (units    (unkn

own)



                    date)                         Screening/Teratol unknown)  



                                                  ogy Counseling -           



                                                  Includes patient,           



                                                  baby's father, or           

 

           (unknown)  (no       (unknown)  (unknown)  -?-?-?-?-?-?-?-?  (units  

  (unknown)



                    date)                         -?-?-?-?  unknown)  

 

           (unknown)  (no       (unknown)  (unknown)  23  (units    (unkno

wn)



                    date)                                   unknown)  

 

           (unknown)  (no       (unknown)  (unknown)  23  (units    (unkno

wn)



                    date)                                   unknown)  

 

           (unknown)  (no       (unknown)  (unknown)  23  (units    (unkno

wn)



                    date)                                   unknown)  

 

           (unknown)  (no       (unknown)  (unknown)  23  (units    (unkno

wn)



                    date)                                   unknown)  

 

           (unknown)  (no       (unknown)  (unknown)  23]  (units    (unkn

own)



                    date)                                   unknown)  

 

           (unknown)  (no       (unknown)  (unknown)  04/15/23  (units    (unkno

wn)



                    date)                         Ultrasound #1 34w unknown)  



                                                  2d                  

 

           (unknown)  (no       (unknown)  (unknown)  [id number]   (units    (unkno

wn)



                    date)                                   unknown)  

 

           (unknown)  (no       (unknown)  (unknown)  22  (units    (unkno

wn)



                    date)                                   unknown)  

 

           (unknown)  (no       (unknown)  (unknown)  10/11/22  (units    (unkno

wn)



                    date)                                   unknown)  

 

           (unknown)  (no       (unknown)  (unknown)  22  (units    (unkno

wn)



                    date)                                   unknown)  

 

           (unknown)  (no       (unknown)  (unknown)  22  (units    (unkno

wn)



                    date)                                   unknown)  

 

           (unknown)  (no       (unknown)  (unknown)  12w 6d 163 lb  (units    (

unknown)



                    date)                                   unknown)  

 

           (unknown)  (no       (unknown)  (unknown)  15:40     (units    (unkno

wn)



                    date)                                   unknown)  

 

           (unknown)  (no       (unknown)  (unknown)  16w 6d 167 lb  (units    (

unknown)



                    date)                                   unknown)  

 

           (unknown)  (no       (unknown)  (unknown)  20w 5d 168 lb  (units    (

unknown)



                    date)                                   unknown)  

 

           (unknown)  (no       (unknown)  (unknown)  24w 6d 173 lb  (units    (

unknown)



                    date)                                   unknown)  

 

           (unknown)  (no       (unknown)  (unknown)  28w 6d 177 lb  (units    (

unknown)



                    date)                                   unknown)  

 

           (unknown)  (no       (unknown)  (unknown)  3 wks     (units    (unkno

wn)



                    date)                                   unknown)  

 

           (unknown)  (no       (unknown)  (unknown)  31w 6d 179 lb  (units    (

unknown)



                    date)                                   unknown)  

 

           (unknown)  (no       (unknown)  (unknown)  4 wk      (units    (unkno

wn)



                    date)                                   unknown)  

 

           (unknown)  (no       (unknown)  (unknown)  8w 6d 162 lb  (units    (u

nknown)



                    date)                                   unknown)  

 

           (unknown)  (no       (unknown)  (unknown)  Abnormal lab  (units    (u

nknown)



                    date)                         values 1st unknown)  



                                                  trimester:           



                                                  discussed           

 

           (unknown)  (no       (unknown)  (unknown)  Abnormal lab  (units    (u

nknown)



                    date)                         values 2nd unknown)  



                                                  trimester:           



                                                  discussed           

 

           (unknown)  (no       (unknown)  (unknown)  Add'l Plan  (units    (unk

nown)



                    date)                         Details   unknown)  

 

           (unknown)  (no       (unknown)  (unknown)  Age/Sex: 28 / F  (units   

 (unknown)



                    date)                         Date of Service: unknown)  

 

           (unknown)  (no       (unknown)  (unknown)  Allergies  (units    (unkn

own)



                    date)                         (-2014)   unknown)  

 

           (unknown)  (no       (unknown)  (unknown)  Allergies  (units    (unkn

own)



                    date)                                   unknown)  

 

           (unknown)  (no       (unknown)  (unknown)  [place]  (units    (

unknown)



                    date)                         [id number]     unknown)  

 

           (unknown)  (no       (unknown)  (unknown)  Anesthesia  (units    (unk

nown)



                    date)                                   unknown)  

 

           (unknown)  (no       (unknown)  (unknown)  Aneuploidy  (units    (unk

nown)



                    date)                         Screening unknown)  



                                                  Offered: Accepted           



                                                  (wants CFDNA)           

 

           (unknown)  (no       (unknown)  (unknown)  Anticipated  (units    (un

known)



                    date)                         course of unknown)  



                                                  prenatal care:           



                                                  discussed           

 

           (unknown)  (no       (unknown)  (unknown)  Anxiety (-2016)  (units   

 (unknown)



                    date)                                   unknown)  

 

           (unknown)  (no       (unknown)  (unknown)  Arrhythmia  (units    (unk

nown)



                    date)                                   unknown)  

 

           (unknown)  (no       (unknown)  (unknown)  Assessment and  (units    

(unknown)



                    date)                         Plan      unknown)  

 

           (unknown)  (no       (unknown)  (unknown)  Attending Dr:  (units    (

unknown)



                    date)                         Julieta Au unknown)  



                                                  MD                  

 

           (unknown)  (no       (unknown)  (unknown)  Libra presents  (units   

 (unknown)



                    date)                         today for routine unknown)  



                                                  OB f/u at 20w5d.           



                                                  She reports good           



                                                  fetal               

 

           (unknown)  (no       (unknown)  (unknown)  BMI 28.7  (units    (unkno

wn)



                    date)                                   unknown)  

 

           (unknown)  (no       (unknown)  (unknown)  /62  (units    (unkn

own)



                    date)                                   unknown)  

 

           (unknown)  (no       (unknown)  (unknown)  Birth     (units    (unkno

wn)



                    date)                         Plan/Preferences unknown)  

 

           (unknown)  (no       (unknown)  (unknown)  Birth Planning  (units    

(unknown)



                    date)                                   unknown)  

 

           (unknown)  (no       (unknown)  (unknown)  Blood Pressure  (units    

(unknown)



                    date)                         Location Rt unknown)  



                                                  brachial            

 

           (unknown)  (no       (unknown)  (unknown)  Blood     (units    (unkno

wn)



                    date)                         transfusions?: unknown)  



                                                  yes (Never had           



                                                  but would accept)           

 

           (unknown)  (no       (unknown)  (unknown)  Breastfeeding:  (units    

(unknown)



                    date)                         discussed unknown)  

 

           (unknown)  (no       (unknown)  (unknown)  Chicken pox  (units    (un

known)



                    date)                         (-)   unknown)  

 

           (unknown)  (no       (unknown)  (unknown)  Childbirth  (units    (unk

nown)



                    date)                         Classes:  unknown)  



                                                  discussed           

 

           (unknown)  (no       (unknown)  (unknown)  Conceived  (units    (unkn

own)



                    date)                         naturally this unknown)  



                                                  pregnancy           

 

           (unknown)  (no       (unknown)  (unknown)  Confirmed  (units    (unkn

own)



                    date)                         23] unknown)  

 

           (unknown)  (no       (unknown)  (unknown)  Current Estimate  (units  

  (unknown)



                    date)                         23  unknown)  



                                                  Ultrasound #2 33w           



                                                  5d                  

 

           (unknown)  (no       (unknown)  (unknown)  Current   (units    (unkno

wn)



                    date)                         Pregnancy History unknown)  

 

           (unknown)  (no       (unknown)  (unknown)  DNA       (units    (unkno

wn)



                    date)                                   unknown)  

 

           (unknown)  (no       (unknown)  (unknown)  : 1995  (units   

 (unknown)



                    date)                         Acct:[id number] unknown)  

 

           (unknown)  (no       (unknown)  (unknown)  Date of positive  (units  

  (unknown)



                    date)                         home pregnancy unknown)  



                                                  test: 08/15/22           

 

           (unknown)  (no       (unknown)  (unknown)  Date      (units    (unkno

wn)



                    date)                                   unknown)  

 

           (unknown)  (no       (unknown)  (unknown) Denies Congenital  (units  

  (unknown)



                    date)                         Heart Defect, unknown)  



                                                  Denies Down           



                                                  Syndrome, Denies           



                                                  Muscular            



                                                  Dystrophy,           

 

           (unknown)  (no       (unknown)  (unknown)  Denies Maternal  (units   

 (unknown)



                    date)                         Metabolic unknown)  



                                                  Disorder (EG,TYPE           



                                                  1 Diabetes, PKU),           



                                                  Denies Patient or           

 

           (unknown)  (no       (unknown)  (unknown)  Denies Neural  (units    (

unknown)



                    date)                         Tube Defect unknown)  



                                                  (Meningomyelocele           



                                                  , Spina Bifida,           



                                                  or Anencephaly),           

 

           (unknown)  (no       (unknown)  (unknown)  Denies Sickle  (units    (

unknown)



                    date)                         Cell Disease or unknown)  



                                                  Trait (),           



                                                  Denies Hemophilia           



                                                  or other blood           

 

           (unknown)  (no       (unknown)  (unknown)  Denies [place]  (units  

  (unknown)



                    date)                         (Ashkenazi unknown)  



                                                  [employer], [place],           



                                                  [place]),           



                                                  Denies [place]           

 

           (unknown)  (no       (unknown)  (unknown)  Depression  (units    (unk

nown)



                    date)                         (-)   unknown)  

 

           (unknown)  (no       (unknown)  (unknown)  Depression:  (units    (un

known)



                    date)                         discussed unknown)  

 

           (unknown)  (no       (unknown)  (unknown)  Dept at   (units    (unkno

wn)



                    date)                         [phone]. unknown)  

 

           (unknown)  (no       (unknown)  (unknown)  Diet and  (units    (unkno

wn)



                    date)                         Exercise  unknown)  

 

           (unknown)  (no       (unknown)  (unknown)  Disease   (units    (unkno

wn)



                    date)                         (Ashkenazi unknown)  



                                                  Advent), Denies           



                                                  Familial            



                                                  Dysautonomia           



                                                  (Ashkenazi           



                                                  Advent),            

 

           (unknown)  (no       (unknown)  (unknown)  Documented By:  (units    

(unknown)



                    date)                         Julieta Au unknown)  



                                                  MD 23 1539           

 

           (unknown)  (no       (unknown)  (unknown)  [employer]     (units    (unkno

wn)



                    date)                                   unknown)  

 

           (unknown)  (no       (unknown)  (unknown)  MEHNAZ Calculator  (units    

(unknown)



                    date)                                   unknown)  

 

           (unknown)  (no       (unknown)  (unknown)  EGA Weight BP  (units    (

unknown)



                    date)                         UGlucose  unknown)  

 

           (unknown)  (no       (unknown)  (unknown)  Eczema (-2000)  (units    

(unknown)



                    date)                                   unknown)  

 

           (unknown)  (no       (unknown)  (unknown)  Estimated  (units    (unkn

own)



                    date)                         Delivery Date unknown)  



                                                  Method Current           

 

           (unknown)  (no       (unknown)  (unknown)  Exercise and  (units    (u

nknown)



                    date)                         activity, unknown)  



                                                  work/environmenta           



                                                  l/hazards, Sexual           



                                                  activity, X-ray           

 

           (unknown)  (no       (unknown)  (unknown)  F/u in 4 wks.  (units    (

unknown)



                    date)                                   unknown)  

 

           (unknown)  (no       (unknown)  (unknown)  Family History  (units    

(unknown)



                    date)                         (Updated 09/10/22 unknown)  



                                                  @ 21:49 by Fatoumata Flores)             

 

           (unknown)  (no       (unknown)  (unknown)  Family/Other  (units    (u

nknown)



                    date)                         Multiple  unknown)  



                                                  sclerosis           

 

           (unknown)  (no       (unknown)  (unknown)  Father    (units    (unkno

wn)



                    date)                         Hypertension unknown)  

 

           (unknown)  (no       (unknown)  (unknown)  Father of Baby:  (units   

 (unknown)



                    date)                         same      unknown)  

 

           (unknown)  (no       (unknown)  (unknown)  [facility]  (units   

 (unknown)



                    date)                         [facility] unknown)  

 

           (unknown)  (no       (unknown)  (unknown)  First Trimester  (units   

 (unknown)



                    date)                         Education unknown)  



                                                  Checklist           

 

           (unknown)  (no       (unknown)  (unknown)  Foot pain  (units    (unkn

own)



                    date)                         (-)   unknown)  

 

           (unknown)  (no       (unknown)  (unknown)   (SAB  (units    (unkn

own)



                    date)                         x7),Fertility Tx, unknown)  



                                                  meds only,           



                                                  started on baby           



                                                  aspirin             



                                                  10/11/2022           

 

           (unknown)  (no       (unknown)  (unknown)  GDM, on   (units    (unkno

wn)



                    date)                         Metformin 500mg unknown)  



                                                  BID                 

 

           (unknown)  (no       (unknown)  (unknown)  Genetic   (units    (unkno

wn)



                    date)                         Screening + unknown)  



                                                  Counseling           

 

           (unknown)  (no       (unknown)  (unknown)  Genetic   (units    (unkno

wn)



                    date)                         Screening unknown)  

 

           (unknown)  (no       (unknown)  (unknown)  Grandfather  (units    (un

known)



                    date)                          Cancer unknown)  

 

           (unknown)  (no       (unknown)  (unknown)  Grandfather  (units    (un

known)



                    date)                          Stroke unknown)  

 

           (unknown)  (no       (unknown)  (unknown)  Grandmother  (units    (un

known)



                    date)                          History unknown)  



                                                  of heart disease           

 

           (unknown)  (no       (unknown)  (unknown)  Grandmother  (units    (un

known)



                    date)                          Multiple unknown)  



                                                  sclerosis           

 

           (unknown)  (no       (unknown)  (unknown)   8  (units    (unkn

own)



                    date)                         Multiple births 0 unknown)  

 

           (unknown)  (no       (unknown)  (unknown)  HIV risk  (units    (unkno

wn)



                    date)                         evaluation: low unknown)  



                                                  risk                

 

           (unknown)  (no       (unknown)  (unknown)  [facility]  (units    (

unknown)



                    date)                         Education unknown)  

 

           (unknown)  (no       (unknown)  (unknown)  Health center  (units    (

unknown)



                    date)                         information: unknown)  



                                                  nature of           



                                                  practice            



                                                  discussed,           



                                                  prenatal            



                                                  personnel           

 

           (unknown)  (no       (unknown)  (unknown)  Height 5 ft 6 in  (units  

  (unknown)



                    date)                                   unknown)  

 

           (unknown)  (no       (unknown)  (unknown)  Hepatitis C risk  (units  

  (unknown)



                    date)                         evaluation: low unknown)  



                                                  risk                

 

           (unknown)  (no       (unknown)  (unknown)  History of  (units    (unk

nown)



                    date)                         Hepatitis B: No unknown)  

 

           (unknown)  (no       (unknown)  (unknown)  History of  (units    (unk

nown)



                    date)                         Hepatitis C: No unknown)  

 

           (unknown)  (no       (unknown)  (unknown)  History of  (units    (unk

nown)



                    date)                         recurrent unknown)  



                                                  miscarriages           

 

           (unknown)  (no       (unknown)  (unknown)  Hospital:   (units    (u

nknown)



                    date)                                   unknown)  

 

           (unknown)  (no       (unknown)  (unknown)  [facility]  (units    (u

nknown)



                    date)                         Chorea, Denies unknown)  



                                                  Other inherited           



                                                  genetic or           



                                                  chromosomal           



                                                  disorder,           

 

           (unknown)  (no       (unknown)  (unknown)  , [facility]  (units    (

unknown)



                    date)                         [place]  unknown)  

 

           (unknown)  (no       (unknown)  (unknown)  Hx #   (units    (u

nknown)



                    date)                         Pregnancies 0 unknown)  



                                                  Elective            



                                                  abortions 0           

 

           (unknown)  (no       (unknown)  (unknown)  Hx # Term  (units    (unkn

own)



                    date)                         Pregnancies 0 unknown)  



                                                  Ectopic             



                                                  pregnancies 0           

 

           (unknown)  (no       (unknown)  (unknown)  Infant will be  (units    

(unknown)



                    date)                         adopted?: no unknown)  

 

           (unknown)  (no       (unknown)  (unknown)  Infection  (units    (unkn

own)



                    date)                         History   unknown)  

 

           (unknown)  (no       (unknown)  (unknown)  Infectious  (units    (unk

nown)



                    date)                         Disease Education unknown)  

 

           (unknown)  (no       (unknown)  (unknown)  Infectious  (units    (unk

nown)



                    date)                         disease exposure: unknown)  



                                                  chicken pox           



                                                  immunity            



                                                  discussed,           



                                                  hepatitis risk           

 

           (unknown)  (no       (unknown)  (unknown)  Infertility  (units    (un

known)



                    date)                         ()   unknown)  

 

           (unknown)  (no       (unknown)  (unknown)  Initial Weight:  (units   

 (unknown)



                    date)                         155 lb    unknown)  

 

           (unknown)  (no       (unknown)  (unknown)  Initials  (units    (unkno

wn)



                    date)                                   unknown)  

 

           (unknown)  (no       (unknown)  (unknown)  Intake Clinical  (units   

 (unknown)



                    date)                         Staff     unknown)  

 

           (unknown)  (no       (unknown)  (unknown)  Intake Note:  (units    (u

nknown)



                    date)                                   unknown)  

 

           (unknown)  (no       (unknown)  (unknown)  Intake performed  (units  

  (unknown)



                    date)                         by:       unknown)  



                                                  Edelmira Montero           

 

           (unknown)  (no       (unknown)  (unknown)  Intake    (units    (unkno

wn)



                    date)                                   unknown)  

 

           (unknown)  (no       (unknown)  (unknown)  Irregular  (units    (unkn

own)



                    date)                         menstrual cycle unknown)  



                                                  ()             

 

           (unknown)  (no       (unknown)  (unknown)  LM        (units    (unkno

wn)



                    date)                                   unknown)  

 

           (unknown)  (no       (unknown)  (unknown)  Lipoma    (units    (unkno

wn)



                    date)                                   unknown)  

 

           (unknown)  (no       (unknown)  (unknown)  Live with  (units    (unkn

own)



                    date)                         someone with TB unknown)  



                                                  or exposed to TB:           



                                                  No                  

 

           (unknown)  (no       (unknown)  (unknown)  Loc: [facility]  (units    (unkno

wn)



                    date)                                   unknown)  

 

           (unknown)  (no       (unknown)  (unknown)  Marital status:  (units   

 (unknown)



                    date)                            unknown)  

 

           (unknown)  (no       (unknown)  (unknown)  Medical History  (units   

 (unknown)



                    date)                         (Updated 23 unknown)  



                                                  @ 16:18 by           



                                                  Julieta Au MD)                 

 

           (unknown)  (no       (unknown)  (unknown)  Medications  (units    (un

known)



                    date)                                   unknown)  

 

           (unknown)  (no       (unknown)  (unknown)  Mother Gastric  (units    

(unknown)



                    date)                         cancer    unknown)  

 

           (unknown)  (no       (unknown)  (unknown)  N No 142 13 N/A  (units   

 (unknown)



                    date)                         4wk       unknown)  

 

           (unknown)  (no       (unknown)  (unknown)  N No no 143 17  (units    

(unknown)



                    date)                         N/A absent AFP 4 unknown)  



                                                  wks                 

 

           (unknown)  (no       (unknown)  (unknown)  N No no 178 9  (units    (

unknown)



                    date)                         N/A absent unknown)  



                                                  long/closed AGA 9           

 

           (unknown)  (no       (unknown)  (unknown)  N Yes no 141 24  (units   

 (unknown)



                    date)                         N/A absent 4 wks unknown)  

 

           (unknown)  (no       (unknown)  (unknown)  N Yes no 142 20  (units   

 (unknown)



                    date)                         N/A absent AFP unknown)  



                                                  negative            

 

           (unknown)  (no       (unknown)  (unknown)  N Yes no 144 29  (units   

 (unknown)



                    date)                         Vertex absent AGA unknown)  



                                                  29w5d               

 

           (unknown)  (no       (unknown)  (unknown)  N Yes no 150 32  (units   

 (unknown)



                    date)                         Vertex absent 2 unknown)  



                                                  wks                 

 

           (unknown)  (no       (unknown)  (unknown)  NF        (units    (unkno

wn)



                    date)                                   unknown)  

 

           (unknown)  (no       (unknown)  (unknown)  Notes     (units    (unkno

wn)



                    date)                                   unknown)  

 

           (unknown)  (no       (unknown)  (unknown)  Number of Living  (units  

  (unknown)



                    date)                         Children 0 unknown)  

 

           (unknown)  (no       (unknown)  (unknown)  Number of  (units    (unkn

own)



                    date)                         fetuses:: Single unknown)  

 

           (unknown)  (no       (unknown)  (unknown)  Nutrition and  (units    (

unknown)



                    date)                         weight gain unknown)  



                                                  counseling:           



                                                  special diet:           



                                                  discussed           

 

           (unknown)  (no       (unknown)  (unknown)  OB Office Visit  (units   

 (unknown)



                    date)                                   unknown)  

 

           (unknown)  (no       (unknown)  (unknown)  OB Visit Log  (units    (u

nknown)



                    date)                                   unknown)  

 

           (unknown)  (no       (unknown)  (unknown)  OB check  (units    (unkno

wn)



                    date)                                   unknown)  

 

           (unknown)  (no       (unknown)  (unknown)  On U/S: AGA  (units    (un

known)



                    date)                         29w5d. 3#4oz unknown)  



                                                  66%ile. Nl AFV.           



                                                  Plan: Metformin           



                                                  500mg BID. F/U 3           

 

           (unknown)  (no       (unknown)  (unknown)  On birth control  (units  

  (unknown)



                    date)                         at conception?: unknown)  



                                                  No                  

 

           (unknown)  (no       (unknown)  (unknown)  Other Estimates  (units   

 (unknown)



                    date)                         23 LMP unknown)  



                                                  (Certain) 35w 6d           

 

           (unknown)  (no       (unknown)  (unknown)  Ovarian cyst  (units    (u

nknown)



                    date)                         ()   unknown)  

 

           (unknown)  (no       (unknown)  (unknown)  PCOS (polycystic  (units  

  (unknown)



                    date)                         ovarian syndrome) unknown)  

 

           (unknown)  (no       (unknown)  (unknown)  PFSH      (units    (unkno

wn)



                    date)                                   unknown)  

 

           (unknown)  (no       (unknown)  (unknown)  Pap performed?:  (units   

 (unknown)



                    date)                         No        unknown)  

 

           (unknown)  (no       (unknown)  (unknown)  Para 0    (units    (unkno

wn)



                    date)                         Spontaneous unknown)  



                                                  abortions 7           

 

           (unknown)  (no       (unknown)  (unknown)  Partner history  (units   

 (unknown)



                    date)                         of STD: denies hx unknown)  

 

           (unknown)  (no       (unknown)  (unknown)  Partner history  (units   

 (unknown)



                    date)                         of genital unknown)  



                                                  herpes: No           

 

           (unknown)  (no       (unknown)  (unknown)  Partner: [place]  (units    (

unknown)



                    date)                         [place]  unknown)  

 

           (unknown)  (no       (unknown)  (unknown)  Patient comes in  (units  

  (unknown)



                    date)                         for follow-up OB unknown)  



                                                  visit. She had           



                                                  some pelvic pain           



                                                  that                

 

           (unknown)  (no       (unknown)  (unknown)  Patient presents  (units  

  (unknown)



                    date)                         for a new OB unknown)  



                                                  visit at 8 weeks           



                                                  gestation. She is           

 

           (unknown)  (no       (unknown)  (unknown)  Patient presents  (units  

  (unknown)



                    date)                         for a routine unknown)  



                                                  prenatal visit,           



                                                  accompanied by           



                                                  her .           

 

           (unknown)  (no       (unknown)  (unknown)  Patient's age 33  (units  

  (unknown)



                    date)                         years or older as unknown)  



                                                  of estimated date           



                                                  of delivery: No           

 

           (unknown)  (no       (unknown)  (unknown)  Patient:  (units    (unkno

wn)



                    date)                         Libra Prieto unknown)  



                                                  MR#: [id number]            

 

           (unknown)  (no       (unknown)  (unknown)  Pediatrician:  (units    (

unknown)



                    date)                         DOD Tumtum vs unknown)  



                                                  [facility]           



                                                  [facility]           



                                                  [place]             

 

           (unknown)  (no       (unknown)  (unknown)  Personal history  (units  

  (unknown)



                    date)                         of STD: denies hx unknown)  

 

           (unknown)  (no       (unknown)  (unknown)  Personal history  (units  

  (unknown)



                    date)                         of genital unknown)  



                                                  herpes: No           

 

           (unknown)  (no       (unknown)  (unknown)  Plantar   (units    (unkno

wn)



                    date)                         fasciitis unknown)  

 

           (unknown)  (no       (unknown)  (unknown)  Position Sitting  (units  

  (unknown)



                    date)                                   unknown)  

 

           (unknown)  (no       (unknown)  (unknown)  Postpartum  (units    (unk

nown)



                    date)                         family    unknown)  



                                                  planning/Tubal           



                                                  sterilization:           



                                                  further             



                                                  discussion needed           

 

           (unknown)  (no       (unknown)  (unknown)  Pregnancy  (units    (unkn

own)



                    date)                         History   unknown)  

 

           (unknown)  (no       (unknown)  (unknown)  Pregnancy type::  (units  

  (unknown)



                    date)                         Other Normal unknown)  



                                                  Pregnancy           

 

           (unknown)  (no       (unknown)  (unknown)  Prenatal  (units    (unkno

wn)



                    date)                         Education unknown)  

 

           (unknown)  (no       (unknown)  (unknown)  Prenatal Initial  (units  

  (unknown)



                    date)                         Assessment unknown)  

 

           (unknown)  (no       (unknown)  (unknown)  Prenatal  (units    (unkno

wn)



                    date)                         Specific  unknown)  



                                                  Issues/Plans           

 

           (unknown)  (no       (unknown)  (unknown)  Prenatal  (units    (unkno

wn)



                    date)                         Testing:  unknown)  



                                                  discussed           

 

           (unknown)  (no       (unknown)  (unknown)  Prenatal Visit  (units    

(unknown)



                    date)                                   unknown)  

 

           (unknown)  (no       (unknown)  (unknown)  Prenatal  (units    (unkno

wn)



                    date)                         education packet: unknown)  



                                                  Child birth           



                                                  education/plan,           



                                                  Pregnancy           



                                                  symptoms,           

 

           (unknown)  (no       (unknown)  (unknown)  Primary Care  (units    (u

nknown)



                    date)                         Provider: BASIM Escobar unknown)  



                                                  [facility]              

 

           (unknown)  (no       (unknown)  (unknown)  Primary Ob  (units    (unk

nown)



                    date)                         Provider: unknown)  



                                                  Garde,Julieta A           

 

           (unknown)  (no       (unknown)  (unknown)  Prior     (units    (unkno

wn)



                    date)                         GBS-Infected unknown)  



                                                  child: No           

 

           (unknown)  (no       (unknown)  (unknown)  Providers  (units    (unkn

own)



                    date)                                   unknown)  

 

           (unknown)  (no       (unknown)  (unknown)  Pt presents for  (units   

 (unknown)



                    date)                         a PNV at 16+6 unknown)  



                                                  wks. No FM. No           



                                                  LOF/VB. Rec'd flu           



                                                  shot                

 

           (unknown)  (no       (unknown)  (unknown)  Pt presents for  (units   

 (unknown)



                    date)                         a routine PNV at unknown)  



                                                  28 +6 wks           



                                                  gestation.           



                                                  Abnormal 3 hr           



                                                  GTT.                

 

           (unknown)  (no       (unknown)  (unknown)  Rash or viral  (units    (

unknown)



                    date)                         illness since unknown)  



                                                  last menstrual           



                                                  period: No           

 

           (unknown)  (no       (unknown)  (unknown)  Rash      (units    (unkno

wn)



                    date)                                   unknown)  

 

           (unknown)  (no       (unknown)  (unknown)  Reason For Visit  (units  

  (unknown)



                    date)                                   unknown)  

 

           (unknown)  (no       (unknown)  (unknown)  Recent travel  (units    (

unknown)



                    date)                         outside of unknown)  



                                                  country?: No           

 

           (unknown)  (no       (unknown)  (unknown)  Recurrent  (units    (unkn

own)



                    date)                         pregnancy loss or unknown)  



                                                  a stillbirth: Yes           

 

           (unknown)  (no       (unknown)  (unknown)  Reports over the  (units  

  (unknown)



                    date)                         counter   unknown)  



                                                  medications           



                                                  (diclofenac),           



                                                  Denies alcohol,           



                                                  Denies              

 

           (unknown)  (no       (unknown)  (unknown)  Safety    (units    (unkno

wn)



                    date)                                   unknown)  

 

           (unknown)  (no       (unknown)  (unknown)  Second Trimester  (units  

  (unknown)



                    date)                         Education unknown)  



                                                  Checklist           

 

           (unknown)  (no       (unknown)  (unknown)  Selecting a  (units    (un

known)



                    date)                          care unknown)  



                                                  provider: further           



                                                  discussion needed           

 

           (unknown)  (no       (unknown)  (unknown)  She is at 22  (units    (u

nknown)



                    date)                         weeks 6 days. She unknown)  



                                                  has felt good           



                                                  fetal movement.           



                                                  She says it is           

 

           (unknown)  (no       (unknown)  (unknown)  Shingles  (units    (unkno

wn)



                    date)                                   unknown)  

 

           (unknown)  (no       (unknown)  (unknown)  Signed By:  (units    (unk

nown)



                    date)                                   unknown)  

 

           (unknown)  (no       (unknown)  (unknown)  Signs and  (units    (unkn

own)



                    date)                         symptoms of unknown)  



                                                   labor:           



                                                  discussed           

 

           (unknown)  (no       (unknown)  (unknown)  Smoking Status:  (units   

 (unknown)



                    date)                         Never smoker unknown)  

 

           (unknown)  (no       (unknown)  (unknown)  Social History  (units    

(unknown)



                    date)                                   unknown)  

 

           (unknown)  (no       (unknown)  (unknown)  Support   (units    (unkno

wn)



                    date)                         Person(s):: [place] unknown)  

 

           (unknown)  (no       (unknown)  (unknown)  Surgical History  (units  

  (unknown)



                    date)                         (Updated 09/10/22 unknown)  



                                                  @ 21:46 by Fatoumata Flores)             

 

           (unknown)  (no       (unknown)  (unknown)  Surrogate  (units    (unkn

own)



                    date)                         pregnancy?: no unknown)  

 

           (unknown)  (no       (unknown)  (unknown)  Symptoms since  (units    

(unknown)



                    date)                         LMP: Reports unknown)  



                                                  amenorrhea,           



                                                  nausea, fatigue,           



                                                  breast              



                                                  tenderness,           

 

           (unknown)  (no       (unknown)  (unknown)  Teratogen  (units    (unkn

own)



                    date)                         Exposures since unknown)  



                                                  LMP/Conception:           



                                                  Denies              



                                                  prescription           



                                                  medications,           

 

           (unknown)  (no       (unknown)  (unknown)  Testing   (units    (unkno

wn)



                    date)                         Education unknown)  

 

           (unknown)  (no       (unknown)  (unknown)  Testing   (units    (unkno

wn)



                    date)                         education unknown)  



                                                  completed: group           



                                                  B strep, Spina           



                                                  bifida testing           



                                                  and Cell Free           

 

           (unknown)  (no       (unknown)  (unknown)  This note may  (units    (

unknown)



                    date)                         have been all or unknown)  



                                                  partially           



                                                  generated using           



                                                  voice recognition           

 

           (unknown)  (no       (unknown)  (unknown)  Tobacco +  (units    (unkn

own)



                    date)                         Substance Use unknown)  

 

           (unknown)  (no       (unknown)  (unknown)  Tobacco Status  (units    

(unknown)



                    date)                                   unknown)  

 

           (unknown)  (no       (unknown)  (unknown)  Trimester:: 3rd  (units   

 (unknown)



                    date)                         Trimester unknown)  



                                                  (28wks-Del)           

 

           (unknown)  (no       (unknown)  (unknown)  Tumor (-10/2012)  (units  

  (unknown)



                    date)                                   unknown)  

 

           (unknown)  (no       (unknown)  (unknown)  Type(s) of  (units    (unk

nown)



                    date)                         exercise: unknown)  



                                                  bicycling           



                                                  (stationary           



                                                  bike), regular           



                                                  exercise, weight           

 

           (unknown)  (no       (unknown)  (unknown) UProtein Movement  (units  

  (unknown)



                    date)                         PreLabor FHR Fndl unknown)  



                                                  Ht Pres Edema           



                                                  Cerv Exam           



                                                  US/Comment Next           



                                                  Appt                

 

           (unknown)  (no       (unknown)  (unknown)  Ultrasound  (units    (unk

nown)



                    date)                         performed?: No unknown)  

 

           (unknown)  (no       (unknown)  (unknown)  Varicella/chicke  (units  

  (unknown)



                    date)                         n pox status: unknown)  



                                                  previous disease           

 

           (unknown)  (no       (unknown)  (unknown)  Visit Date:  (units    (un

known)



                    date)                         23 Last unknown)  



                                                  Updated by:           



                                                  Julieta Au MD                  

 

           (unknown)  (no       (unknown)  (unknown)  Visit Date:  (units    (un

known)



                    date)                         23 Last unknown)  



                                                  Updated by:           



                                                  Julieta Au MD                  

 

           (unknown)  (no       (unknown)  (unknown)  Visit Date:  (units    (un

known)



                    date)                         22 Last unknown)  



                                                  Updated by:           



                                                  Julieta Au MD                  

 

           (unknown)  (no       (unknown)  (unknown)  Visit Date:  (units    (un

known)



                    date)                         10/11/22 Last unknown)  



                                                  Updated by:           



                                                  Fanny Baker MD                  

 

           (unknown)  (no       (unknown)  (unknown)  Visit Date:  (units    (un

known)



                    date)                         22 Last unknown)  



                                                  Updated by:           



                                                  Julieta Au MD                  

 

           (unknown)  (no       (unknown)  (unknown)  Visit Date:  (units    (un

known)



                    date)                         22 Last unknown)  



                                                  Updated by: Berta Salinas P.A-C           

 

           (unknown)  (no       (unknown)  (unknown)  Visit Reasons:  (units    

(unknown)



                    date)                         OB        unknown)  

 

           (unknown)  (no       (unknown)  (unknown)  Vitals    (units    (unkno

wn)



                    date)                                   unknown)  

 

           (unknown)  (no       (unknown)  (unknown)  Vitamins and  (units    (u

nknown)



                    date)                         iron, Diet and unknown)  



                                                  weight gain, Fish           



                                                  and mercury           



                                                  intake, Caffeine           



                                                  use,                

 

           (unknown)  (no       (unknown)  (unknown)  WG        (units    (unkno

wn)



                    date)                                   unknown)  

 

           (unknown)  (no       (unknown)  (unknown)  Weeks     (units    (unkno

wn)



                    date)                         gestation:: 33 unknown)  

 

           (unknown)  (no       (unknown)  (unknown)  Weight 178 lb  (units    (

unknown)



                    date)                                   unknown)  

 

           (unknown)  (no       (unknown)  (unknown)  [place] teeth  (units    (u

nknown)



                    date)                         extracted unknown)  

 

           (unknown)  (no       (unknown)  (unknown)  Zika virus  (units    (unk

nown)



                    date)                         exposure: No unknown)  

 

           (unknown)  (no       (unknown)  (unknown)  accompanied by  (units    

(unknown)



                    date)                         her . She unknown)  



                                                  went through           



                                                  fertility           



                                                  treatments with           

 

           (unknown)  (no       (unknown)  (unknown)  alcohol intake:  (units   

 (unknown)



                    date)                         never     unknown)  

 

           (unknown)  (no       (unknown)  (unknown)  anterior  (units    (unkno

wn)



                    date)                         placenta. Routine unknown)  



                                                  precautions           



                                                  reviewed with the           



                                                  patient. Due to           



                                                  1st                 

 

           (unknown)  (no       (unknown)  (unknown)  anyone in either  (units  

  (unknown)



                    date)                         family with: unknown)  

 

           (unknown)  (no       (unknown)  (unknown)  baby's father  (units    (

unknown)



                    date)                         had a child with unknown)  



                                                  birth defects not           



                                                  listed above and           



                                                  Denies Other           

 

           (unknown)  (no       (unknown)  (unknown)  back pain.  (units    (unk

nown)



                    date)                         Advised   unknown)  



                                                  stretching, belly           



                                                  band, and PT if           



                                                  not improving.           



                                                  AFP was             

 

           (unknown)  (no       (unknown)  (unknown)  blood sugar  (units    (un

known)



                    date)                         diagnostic (Blood unknown)  



                                                  Glucose Test           



                                                  strips) #100 ea           



                                                  23 [Rx           

 

           (unknown)  (no       (unknown)  (unknown)  blood-glucose  (units    (

unknown)



                    date)                         meter #1 ea unknown)  



                                                  23 [Rx           



                                                  Confirmed           



                                                  23]           

 

           (unknown)  (no       (unknown)  (unknown)  by ultrasound is  (units  

  (unknown)



                    date)                         normal with good unknown)  



                                                  fetal movement,           



                                                  normal appearing           



                                                  fluid,              

 

           (unknown)  (no       (unknown)  (unknown)  caffeine: Yes  (units    (

unknown)



                    date)                         (1-2 cups unknown)  



                                                  tea/day)            

 

           (unknown)  (no       (unknown)  (unknown)  carbon monox  (units    (u

nknown)



                    date)                         detector in home: unknown)  



                                                  Yes                 

 

           (unknown)  (no       (unknown)  (unknown)  cfDNA normal  (units    (u

nknown)



                    date)                         female, AFP unknown)  



                                                  negative            

 

           (unknown)  (no       (unknown)  (unknown)  consistent with  (units   

 (unknown)



                    date)                         9 weeks 0 days. unknown)  



                                                  Yolk sac visible.           



                                                  Fetal heart rate           



                                                  178 beats           

 

           (unknown)  (no       (unknown)  (unknown)  current   (units    (unkno

wn)



                    date)                         occupational unknown)  



                                                  exposures/hazards           



                                                  : No                

 

           (unknown)  (no       (unknown)  (unknown)  daily servings  (units    

(unknown)



                    date)                         fruits/ve-4 unknown)  

 

           (unknown)  (no       (unknown)  (unknown)  described, visit  (units  

  (unknown)



                    date)                         schedule  unknown)  



                                                  reviewed,           



                                                  ultrasounds           



                                                  policy reviewed,           



                                                  coverage 24           

 

           (unknown)  (no       (unknown)  (unknown)  developing a  (units    (u

nknown)



                    date)                         pattern. No unknown)  



                                                  leakage of fluid           



                                                  or vaginal           



                                                  bleeding. No           



                                                  contractions           

 

           (unknown)  (no       (unknown)  (unknown)  discussed,  (units    (unk

nown)



                    date)                         tuberculosis unknown)  



                                                  exposure            



                                                  discussed, CMV           



                                                  discussed,           



                                                  Toxoplasmosis           

 

           (unknown)  (no       (unknown)  (unknown)  disorders,  (units    (unk

nown)



                    date)                         Denies Cystic unknown)  



                                                  Fibrosis, Denies           



                                                  Mental              



                                                  Retardation/Autis           



                                                  m, Denies           

 

           (unknown)  (no       (unknown)  (unknown)  do you feel safe  (units  

  (unknown)



                    date)                         at home: Yes unknown)  

 

           (unknown)  (no       (unknown)  (unknown)  during the past  (units   

 (unknown)



                    date)                         year weight has: unknown)  



                                                  remained stable           

 

           (unknown)  (no       (unknown)  (unknown)  education level:  (units  

  (unknown)



                    date)                         college   unknown)  



                                                  (Associate's           



                                                  degree)             

 

           (unknown)  (no       (unknown)  (unknown)  exposure,  (units    (unkn

own)



                    date)                         Medication use, unknown)  



                                                  Sauna/hot tub           



                                                  use, Dental care,           



                                                  Travel and           



                                                  Influenza           

 

           (unknown)  (no       (unknown)  (unknown)  fire      (units    (unkno

wn)



                    date)                         extinguisher in unknown)  



                                                  home: Yes           

 

           (unknown)  (no       (unknown)  (unknown)  firearms in  (units    (un

known)



                    date)                         home: Yes unknown)  



                                                  firearms unloaded           



                                                  and locked: Yes           

 

           (unknown)  (no       (unknown)  (unknown)  frequency: 5-6  (units    

(unknown)



                    date)                         times per week unknown)  

 

           (unknown)  (no       (unknown)  (unknown)  gestational sac  (units   

 (unknown)



                    date)                         with a fetus with unknown)  



                                                  a crown-rump           



                                                  length measuring           



                                                  2.29 cm             

 

           (unknown)  (no       (unknown)  (unknown)  have occurred.  (units    

(unknown)



                    date)                         If there are any unknown)  



                                                  questions, please           



                                                  contact the           



                                                  Medical Records           

 

           (unknown)  (no       (unknown)  (unknown)  hours a day and  (units   

 (unknown)



                    date)                         participation of unknown)  



                                                  father in           



                                                  prenatal care and           



                                                  office visits           

 

           (unknown)  (no       (unknown)  (unknown)  household  (units    (unkn

own)



                    date)                         members: spouse unknown)  



                                                  and family           



                                                  (father and           



                                                  father-in-law)           

 

           (unknown)  (no       (unknown)  (unknown)  housing: house  (units    

(unknown)



                    date)                                   unknown)  

 

           (unknown)  (no       (unknown)  (unknown)  illicit drugs  (units    (

unknown)



                    date)                         and Denies other unknown)  

 

           (unknown)  (no       (unknown)  (unknown)  kag       (units    (unkno

wn)



                    date)                                   unknown)  

 

           (unknown)  (no       (unknown)  (unknown)  lancets #100 ea  (units   

 (unknown)



                    date)                         23 [Rx unknown)  



                                                  Confirmed           



                                                  23]           

 

           (unknown)  (no       (unknown)  (unknown)  last visit.  (units    (un

known)



                    date)                         Plan: AFP today. unknown)  



                                                  20 wk u/s           



                                                  reviewed. F/U 4           



                                                  wks. Warning           



                                                  signs               

 

           (unknown)  (no       (unknown)  (unknown)  lifting and  (units    (un

known)



                    date)                         other (hiking) unknown)  

 

           (unknown)  (no       (unknown)  (unknown)  lives     (units    (unkno

wn)



                    date)                         independently: unknown)  



                                                  Yes                 

 

           (unknown)  (no       (unknown)  (unknown)  marital status:  (units   

 (unknown)



                    date)                            unknown)  

 

           (unknown)  (no       (unknown)  (unknown)  may occur.  (units    (unk

nown)



                    date)                         Occasional unknown)  



                                                  wrong-word or           



                                                  'sound-alike'           



                                                  substitutions may           



                                                  have                

 

           (unknown)  (no       (unknown)  (unknown)  medication only.  (units  

  (unknown)



                    date)                         Had 7     unknown)  



                                                  miscarriages. No           



                                                  bleeding with           



                                                  this pregnancy.           



                                                  This 1              

 

           (unknown)  (no       (unknown)  (unknown)  metformin 500 mg  (units  

  (unknown)



                    date)                         tablet 500 mg PO unknown)  



                                                  BID #60 tabs           



                                                  23 [Rx           



                                                  Confirmed           



                                                  23]           

 

           (unknown)  (no       (unknown)  (unknown)  movement and  (units    (u

nknown)



                    date)                         denies VB, LOF, unknown)  



                                                  cramping and           



                                                  regular             



                                                  contractions. Pt           



                                                  reports low           

 

           (unknown)  (no       (unknown)  (unknown)  negative.  (units    (unkn

own)



                    date)                         Anatomy US unknown)  



                                                  scheduled for           



                                                  later today.           



                                                  Warning             



                                                  precautions           



                                                  reviewed.           

 

           (unknown)  (no       (unknown)  (unknown)  nickel Allergy  (units    

(unknown)



                    date)                         (Mild, Verified unknown)  



                                                  23 15:40)           

 

           (unknown)  (no       (unknown)  (unknown)  number of  (units    (unkn

own)



                    date)                         children: 0 unknown)  

 

           (unknown)  (no       (unknown)  (unknown)  occupational  (units    (u

nknown)



                    date)                         status: employed unknown)  



                                                  (active duty           



                                                  avionics            



                                                  ,           



                                                  [employer] job            

 

           (unknown)  (no       (unknown)  (unknown)  occurred due to  (units   

 (unknown)



                    date)                         the inherent unknown)  



                                                  limitations of           



                                                  voice recognition           



                                                  software. Please           

 

           (unknown)  (no       (unknown)  (unknown)  or cramping.  (units    (u

nknown)



                    date)                         Plan: 1 hour unknown)  



                                                  glucose ordered.           



                                                  Follow-up in 4           



                                                  weeks. Warning           

 

           (unknown)  (no       (unknown)  (unknown)  per minute.  (units    (un

known)



                    date)                         Normal ovaries unknown)  



                                                  bilaterally.           



                                                  Plan: Follow-up           



                                                  in 4 weeks.           



                                                  Warning             

 

           (unknown)  (no       (unknown)  (unknown)  pets and  (units    (unkno

wn)



                    date)                         animals: Yes (1 unknown)  



                                                  large dog,           



                                                  chickens)           

 

           (unknown)  (no       (unknown)  (unknown)  precautions,  (units    (u

nknown)



                    date)                         Listeriosis unknown)  



                                                  prevention and           



                                                  Rubella             



                                                  Immunization           

 

           (unknown)  (no       (unknown)  (unknown)  preeclampsia.  (units    (

unknown)



                    date)                                   unknown)  

 

           (unknown)  (no       (unknown)  (unknown)  pregnancy  (units    (unkn

own)



                    date)                         discussed unknown)  



                                                  starting baby           



                                                  aspirin per day           



                                                  to decrease her           



                                                  risk for            

 

           (unknown)  (no       (unknown)  (unknown)  prenat.vits,nan,  (units  

  (unknown)



                    date)                         min-iron-folic 1 unknown)  



                                                  tab PO DAILY           



                                                  22 [History           



                                                  Confirmed           

 

           (unknown)  (no       (unknown)  (unknown)  read the note  (units    (

unknown)



                    date)                         carefully and unknown)  



                                                  recognize, using           



                                                  context, where           



                                                  these               



                                                  substitutions           

 

           (unknown)  (no       (unknown)  (unknown)  reviewed.  (units    (unkn

own)



                    date)                                   unknown)  

 

           (unknown)  (no       (unknown)  (unknown)  seatbelt use:  (units    (

unknown)



                    date)                         always    unknown)  

 

           (unknown)  (no       (unknown)  (unknown)  second hand  (units    (un

known)



                    date)                         exposure: Yes unknown)  



                                                  (father-in-law           



                                                  smokes outside           



                                                  the house)           

 

           (unknown)  (no       (unknown)  (unknown)  signs reviewed.  (units   

 (unknown)



                    date)                         cfDNA ordered. unknown)  

 

           (unknown)  (no       (unknown)  (unknown)  signs reviewed.  (units   

 (unknown)



                    date)                                   unknown)  

 

           (unknown)  (no       (unknown)  (unknown)  software.  (units    (unkn

own)



                    date)                         Although every unknown)  



                                                  effort is made to           



                                                  edit content,           



                                                  transcription           



                                                  errors              

 

           (unknown)  (no       (unknown)  (unknown)  special madi  (units    (

unknown)



                    date)                         needs: No unknown)  

 

           (unknown)  (no       (unknown)  (unknown)  substance use  (units    (

unknown)



                    date)                         type: does not unknown)  



                                                  use                 

 

           (unknown)  (no       (unknown)  (unknown)  travel history:  (units   

 (unknown)



                    date)                         over 6 months ago unknown)  

 

           (unknown)  (no       (unknown)  (unknown)  urinary   (units    (unkno

wn)



                    date)                         frequency and unknown)  



                                                  irritability           

 

           (unknown)  (no       (unknown)  (unknown)  vaccine (Annual  (units   

 (unknown)



                    date)                         flu, Phizer Covid unknown)  



                                                  x2)                 

 

           (unknown)  (no       (unknown)  (unknown)  w0d 4 wks  (units    (unkn

own)



                    date)                                   unknown)  

 

           (unknown)  (no       (unknown)  (unknown)  was not using  (units    (

unknown)



                    date)                         any infertility unknown)  



                                                  medications.           



                                                  Ultrasound: An           



                                                  intrauterine           

 

           (unknown)  (no       (unknown)  (unknown)  water heater  (units    (u

nknown)



                    date)                         temp set < 120 unknown)  



                                                  deg: Yes            

 

           (unknown)  (no       (unknown)  (unknown)  well-balanced  (units    (

unknown)



                    date)                         diet: daily or unknown)  



                                                  most days           

 

           (unknown)  (no       (unknown)  (unknown)  went away with  (units    

(unknown)



                    date)                         laying down. No unknown)  



                                                  vaginal bleeding.           



                                                  No fevers. Fetal           



                                                  heart tone           

 

           (unknown)  (no       (unknown)  (unknown)  while pregnant)  (units   

 (unknown)



                    date)                                   unknown)  

 

           (unknown)  (no       (unknown)  (unknown)  wks. Warning  (units    (u

nknown)



                    date)                         signs for PTL unknown)  



                                                  reviewed.           

 

           (unknown)  (no       (unknown)  (unknown)  working smoke  (units    (

unknown)



                    date)                         detector in home: unknown)  



                                                  Yes                 









                                         Result panel 23









           (unknown)  (no       (unknown)  (unknown)  (no value)  (units    (unk

nown)



                    date)                                   unknown)  

 

           (unknown)  (no       (unknown)  (unknown)  (+12 lb) 120/58  (units   

 (unknown)



                    date)                         N         unknown)  

 

           (unknown)  (no       (unknown)  (unknown)  (+13 lb) 104/54  (units   

 (unknown)



                    date)                         N         unknown)  

 

           (unknown)  (no       (unknown)  (unknown)  (+18 lb) 122/60  (units   

 (unknown)



                    date)                         N         unknown)  

 

           (unknown)  (no       (unknown)  (unknown)  (+22 lb) 110/62  (units   

 (unknown)



                    date)                         N         unknown)  

 

           (unknown)  (no       (unknown)  (unknown)  (+23 lb) 116/62  (units   

 (unknown)



                    date)                         N         unknown)  

 

           (unknown)  (no       (unknown)  (unknown)  (+24 lb) 128/62  (units   

 (unknown)



                    date)                         N         unknown)  

 

           (unknown)  (no       (unknown)  (unknown)  (+7 lb) 128/66 N  (units  

  (unknown)



                    date)                                   unknown)  

 

           (unknown)  (no       (unknown)  (unknown)  (+8 lb) 122/68 N  (units  

  (unknown)



                    date)                                   unknown)  

 

           (unknown)  (no       (unknown)  (unknown)  **Genetic  (units    (unkn

own)



                    date)                         Screening/Teratol unknown)  



                                                  ogy Counseling -           



                                                  Includes patient,           



                                                  baby's father, or           

 

           (unknown)  (no       (unknown)  (unknown)  -?-?-?-?-?-?-?-?  (units  

  (unknown)



                    date)                         -?-?-?-?  unknown)  

 

           (unknown)  (no       (unknown)  (unknown)  23  (units    (unkno

wn)



                    date)                                   unknown)  

 

           (unknown)  (no       (unknown)  (unknown)  23  (units    (unkno

wn)



                    date)                                   unknown)  

 

           (unknown)  (no       (unknown)  (unknown)  23  (units    (unkno

wn)



                    date)                                   unknown)  

 

           (unknown)  (no       (unknown)  (unknown)  23  (units    (unkno

wn)



                    date)                                   unknown)  

 

           (unknown)  (no       (unknown)  (unknown)  23]  (units    (unkn

own)



                    date)                                   unknown)  

 

           (unknown)  (no       (unknown)  (unknown)  04/15/23  (units    (unkno

wn)



                    date)                         Ultrasound #1 34w unknown)  



                                                  2d                  

 

           (unknown)  (no       (unknown)  (unknown)  [id number]   (units    (unkno

wn)



                    date)                                   unknown)  

 

           (unknown)  (no       (unknown)  (unknown)  22  (units    (unkno

wn)



                    date)                                   unknown)  

 

           (unknown)  (no       (unknown)  (unknown)  10/11/22  (units    (unkno

wn)



                    date)                                   unknown)  

 

           (unknown)  (no       (unknown)  (unknown)  22  (units    (unkno

wn)



                    date)                                   unknown)  

 

           (unknown)  (no       (unknown)  (unknown)  22  (units    (unkno

wn)



                    date)                                   unknown)  

 

           (unknown)  (no       (unknown)  (unknown)  12w 6d 163 lb  (units    (

unknown)



                    date)                                   unknown)  

 

           (unknown)  (no       (unknown)  (unknown)  15:40     (units    (unkno

wn)



                    date)                                   unknown)  

 

           (unknown)  (no       (unknown)  (unknown)  16w 6d 167 lb  (units    (

unknown)



                    date)                                   unknown)  

 

           (unknown)  (no       (unknown)  (unknown)  20w 5d 168 lb  (units    (

unknown)



                    date)                                   unknown)  

 

           (unknown)  (no       (unknown)  (unknown)  24w 6d 173 lb  (units    (

unknown)



                    date)                                   unknown)  

 

           (unknown)  (no       (unknown)  (unknown)  28w 6d 177 lb  (units    (

unknown)



                    date)                                   unknown)  

 

           (unknown)  (no       (unknown)  (unknown)  3 wks     (units    (unkno

wn)



                    date)                                   unknown)  

 

           (unknown)  (no       (unknown)  (unknown)  31w 6d 179 lb  (units    (

unknown)



                    date)                                   unknown)  

 

           (unknown)  (no       (unknown)  (unknown)  33w 5d 178 lb  (units    (

unknown)



                    date)                                   unknown)  

 

           (unknown)  (no       (unknown)  (unknown)  4 wk      (units    (unkno

wn)



                    date)                                   unknown)  

 

           (unknown)  (no       (unknown)  (unknown)  8w 6d 162 lb  (units    (u

nknown)



                    date)                                   unknown)  

 

           (unknown)  (no       (unknown)  (unknown)  Abnormal lab  (units    (u

nknown)



                    date)                         values 1st unknown)  



                                                  trimester:           



                                                  discussed           

 

           (unknown)  (no       (unknown)  (unknown)  Abnormal lab  (units    (u

nknown)



                    date)                         values 2nd unknown)  



                                                  trimester:           



                                                  discussed           

 

           (unknown)  (no       (unknown)  (unknown)  Add'l Plan  (units    (unk

nown)



                    date)                         Details   unknown)  

 

           (unknown)  (no       (unknown)  (unknown)  Age/Sex: 28 / F  (units   

 (unknown)



                    date)                         Date of Service: unknown)  

 

           (unknown)  (no       (unknown)  (unknown)  Allergies  (units    (unkn

own)



                    date)                         ()   unknown)  

 

           (unknown)  (no       (unknown)  (unknown)  Allergies  (units    (unkn

own)



                    date)                                   unknown)  

 

           (unknown)  (no       (unknown)  (unknown)  SARINA Barker  (units    (

unknown)



                    date)                         [address]     unknown)  

 

           (unknown)  (no       (unknown)  (unknown)  Anesthesia  (units    (unk

nown)



                    date)                                   unknown)  

 

           (unknown)  (no       (unknown)  (unknown)  Aneuploidy  (units    (unk

nown)



                    date)                         Screening unknown)  



                                                  Offered: Accepted           



                                                  (wants CFDNA)           

 

           (unknown)  (no       (unknown)  (unknown)  Anticipated  (units    (un

known)



                    date)                         course of unknown)  



                                                  prenatal care:           



                                                  discussed           

 

           (unknown)  (no       (unknown)  (unknown)  Anxiety ()  (units   

 (unknown)



                    date)                                   unknown)  

 

           (unknown)  (no       (unknown)  (unknown)  Arrhythmia  (units    (unk

nown)



                    date)                                   unknown)  

 

           (unknown)  (no       (unknown)  (unknown)  Assessment and  (units    

(unknown)



                    date)                         Plan      unknown)  

 

           (unknown)  (no       (unknown)  (unknown)  Attending Dr:  (units    (

unknown)



                    date)                         Julieta Au unknown)  



                                                  MD                  

 

           (unknown)  (no       (unknown)  (unknown)  Libra presents  (units   

 (unknown)



                    date)                         today for routine unknown)  



                                                  OB f/u at 20w5d.           



                                                  She reports good           



                                                  fetal               

 

           (unknown)  (no       (unknown)  (unknown)  BMI 28.7  (units    (unkno

wn)



                    date)                                   unknown)  

 

           (unknown)  (no       (unknown)  (unknown)  /62  (units    (unkn

own)



                    date)                                   unknown)  

 

           (unknown)  (no       (unknown)  (unknown)  Birth     (units    (unkno

wn)



                    date)                         Plan/Preferences unknown)  

 

           (unknown)  (no       (unknown)  (unknown)  Birth Planning  (units    

(unknown)



                    date)                                   unknown)  

 

           (unknown)  (no       (unknown)  (unknown)  Blood Pressure  (units    

(unknown)



                    date)                         Location Rt unknown)  



                                                  brachial            

 

           (unknown)  (no       (unknown)  (unknown)  Blood     (units    (unkno

wn)



                    date)                         transfusions?: unknown)  



                                                  yes (Never had           



                                                  but would accept)           

 

           (unknown)  (no       (unknown)  (unknown)  Breastfeeding:  (units    

(unknown)



                    date)                         discussed unknown)  

 

           (unknown)  (no       (unknown)  (unknown)  Chicken pox  (units    (un

known)



                    date)                         ()   unknown)  

 

           (unknown)  (no       (unknown)  (unknown)  Childbirth  (units    (unk

nown)



                    date)                         Classes:  unknown)  



                                                  discussed           

 

           (unknown)  (no       (unknown)  (unknown)  Conceived  (units    (unkn

own)



                    date)                         naturally this unknown)  



                                                  pregnancy           

 

           (unknown)  (no       (unknown)  (unknown)  Confirmed  (units    (unkn

own)



                    date)                         23] unknown)  

 

           (unknown)  (no       (unknown)  (unknown)  Current Estimate  (units  

  (unknown)



                    date)                         23  unknown)  



                                                  Ultrasound #2 33w           



                                                  5d                  

 

           (unknown)  (no       (unknown)  (unknown)  Current   (units    (unkno

wn)



                    date)                         Pregnancy History unknown)  

 

           (unknown)  (no       (unknown)  (unknown)  DNA       (units    (unkno

wn)



                    date)                                   unknown)  

 

           (unknown)  (no       (unknown)  (unknown)  : 1995  (units   

 (unknown)



                    date)                         Acct:[id number] unknown)  

 

           (unknown)  (no       (unknown)  (unknown)  Date of positive  (units  

  (unknown)



                    date)                         home pregnancy unknown)  



                                                  test: 08/15/22           

 

           (unknown)  (no       (unknown)  (unknown)  Date      (units    (unkno

wn)



                    date)                                   unknown)  

 

           (unknown)  (no       (unknown)  (unknown) Denies Congenital  (units  

  (unknown)



                    date)                         Heart Defect, unknown)  



                                                  Denies Down           



                                                  Syndrome, Denies           



                                                  Muscular            



                                                  Dystrophy,           

 

           (unknown)  (no       (unknown)  (unknown)  Denies Maternal  (units   

 (unknown)



                    date)                         Metabolic unknown)  



                                                  Disorder (EG,TYPE           



                                                  1 Diabetes, PKU),           



                                                  Denies Patient or           

 

           (unknown)  (no       (unknown)  (unknown)  Denies Neural  (units    (

unknown)



                    date)                         Tube Defect unknown)  



                                                  (Meningomyelocele           



                                                  , Spina Bifida,           



                                                  or Anencephaly),           



                                                  D                   

 

           (unknown)  (no       (unknown)  (unknown)  Denies Sickle  (units    (

unknown)



                    date)                         Cell Disease or unknown)  



                                                  Trait (),           



                                                  Denies Hemophilia           



                                                  or other blood           

 

           (unknown)  (no       (unknown)  (unknown)  Depression  (units    (unk

nown)



                    date)                         (-2016)   unknown)  

 

           (unknown)  (no       (unknown)  (unknown)  Depression:  (units    (un

known)



                    date)                         discussed unknown)  

 

           (unknown)  (no       (unknown)  (unknown)  Dept at   (units    (unkno

wn)



                    date)                         [phone]. unknown)  

 

           (unknown)  (no       (unknown)  (unknown)  Diet and  (units    (unkno

wn)



                    date)                         Exercise  unknown)  

 

           (unknown)  (no       (unknown)  (unknown)  Disease   (units    (unkno

wn)



                    date)                         (Ashkenazi unknown)  



                                                  Advent), Denies           



                                                  Familial            



                                                  Dysautonomia           



                                                  (Ashkenazi           



                                                  Advent),            

 

           (unknown)  (no       (unknown)  (unknown)  Documented By:  (units    

(unknown)



                    date)                         Julieta Au unknown)  



                                                  MD 23 1539           

 

           (unknown)  (no       (unknown)  (unknown)  [employer]     (units    (unkno

wn)



                    date)                                   unknown)  

 

           (unknown)  (no       (unknown)  (unknown)  MEHNAZ Calculator  (units    

(unknown)



                    date)                                   unknown)  

 

           (unknown)  (no       (unknown)  (unknown)  EGA Weight BP  (units    (

unknown)



                    date)                         UGlucose  unknown)  

 

           (unknown)  (no       (unknown)  (unknown)  Eczema (-2000)  (units    

(unknown)



                    date)                                   unknown)  

 

           (unknown)  (no       (unknown)  (unknown)  Estimated  (units    (unkn

own)



                    date)                         Delivery Date unknown)  



                                                  Method Current           

 

           (unknown)  (no       (unknown)  (unknown)  Exercise and  (units    (u

nknown)



                    date)                         activity, unknown)  



                                                  work/environmenta           



                                                  l/hazards, Sexual           



                                                  activity, X-ray           

 

           (unknown)  (no       (unknown)  (unknown)  F/u in 4 wks.  (units    (

unknown)



                    date)                                   unknown)  

 

           (unknown)  (no       (unknown)  (unknown)  Family History  (units    

(unknown)



                    date)                         (Updated 09/10/22 unknown)  



                                                  @ 21:49 by Fatoumata Flores)             

 

           (unknown)  (no       (unknown)  (unknown)  Family/Other  (units    (u

nknown)



                    date)                         Multiple  unknown)  



                                                  sclerosis           

 

           (unknown)  (no       (unknown)  (unknown)  Father    (units    (unkno

wn)



                    date)                         Hypertension unknown)  

 

           (unknown)  (no       (unknown)  (unknown)  Father of Baby:  (units   

 (unknown)



                    date)                         same      unknown)  

 

           (unknown)  (no       (unknown)  (unknown)  [facility]  (units   

 (unknown)



                    date)                         Associates unknown)  

 

           (unknown)  (no       (unknown)  (unknown)  First Trimester  (units   

 (unknown)



                    date)                         Education unknown)  



                                                  Checklist           

 

           (unknown)  (no       (unknown)  (unknown)  Foot pain  (units    (unkn

own)



                    date)                         ()   unknown)  

 

           (unknown)  (no       (unknown)  (unknown)   (SAB  (units    (unkn

own)



                    date)                         x7),Fertility Tx, unknown)  



                                                  meds only,           



                                                  started on baby           



                                                  aspirin             



                                                  10/11/2022           

 

           (unknown)  (no       (unknown)  (unknown)  GDM, on   (units    (unkno

wn)



                    date)                         Metformin 500mg unknown)  



                                                  BID                 

 

           (unknown)  (no       (unknown)  (unknown)  Genetic   (units    (unkno

wn)



                    date)                         Screening + unknown)  



                                                  Counseling           

 

           (unknown)  (no       (unknown)  (unknown)  Genetic   (units    (unkno

wn)



                    date)                         Screening unknown)  

 

           (unknown)  (no       (unknown)  (unknown)  Grandfather  (units    (un

known)



                    date)                          Cancer unknown)  

 

           (unknown)  (no       (unknown)  (unknown)  Grandfather  (units    (un

known)



                    date)                          Stroke unknown)  

 

           (unknown)  (no       (unknown)  (unknown)  Grandmother  (units    (un

known)



                    date)                          History unknown)  



                                                  of heart disease           

 

           (unknown)  (no       (unknown)  (unknown)  Grandmother  (units    (un

known)



                    date)                          Multiple unknown)  



                                                  sclerosis           

 

           (unknown)  (no       (unknown)  (unknown)   8  (units    (unkn

own)



                    date)                         Multiple births 0 unknown)  

 

           (unknown)  (no       (unknown)  (unknown)  HIV risk  (units    (unkno

wn)



                    date)                         evaluation: low unknown)  



                                                  risk                

 

           (unknown)  (no       (unknown)  (unknown)  [facility]  (units    (

unknown)



                    date)                         Education unknown)  

 

           (unknown)  (no       (unknown)  (unknown)  Health center  (units    (

unknown)



                    date)                         information: unknown)  



                                                  nature of           



                                                  practice            



                                                  discussed,           



                                                  prenatal            



                                                  personnel           

 

           (unknown)  (no       (unknown)  (unknown)  Height 5 ft 6 in  (units  

  (unknown)



                    date)                                   unknown)  

 

           (unknown)  (no       (unknown)  (unknown)  Hepatitis C risk  (units  

  (unknown)



                    date)                         evaluation: low unknown)  



                                                  risk                

 

           (unknown)  (no       (unknown)  (unknown)  History of  (units    (unk

nown)



                    date)                         Hepatitis B: No unknown)  

 

           (unknown)  (no       (unknown)  (unknown)  History of  (units    (unk

nown)



                    date)                         Hepatitis C: No unknown)  

 

           (unknown)  (no       (unknown)  (unknown)  History of  (units    (unk

nown)



                    date)                         recurrent unknown)  



                                                  miscarriages           

 

           (unknown)  (no       (unknown)  (unknown)  Hospital:   (units    (u

nknown)



                    date)                                   unknown)  

 

           (unknown)  (no       (unknown)  (unknown)  [facility]  (units    (u

nknown)



                    date)                         Chorea, Denies unknown)  



                                                  Other inherited           



                                                  genetic or           



                                                  chromosomal           



                                                  disorder,           

 

           (unknown)  (no       (unknown)  (unknown)  , [place]  (units    (

unknown)



                    date)                         [place]  unknown)  

 

           (unknown)  (no       (unknown)  (unknown)  Hx #   (units    (u

nknown)



                    date)                         Pregnancies 0 unknown)  



                                                  Elective            



                                                  abortions 0           

 

           (unknown)  (no       (unknown)  (unknown)  Hx # Term  (units    (unkn

own)



                    date)                         Pregnancies 0 unknown)  



                                                  Ectopic             



                                                  pregnancies 0           

 

           (unknown)  (no       (unknown)  (unknown)  Infant will be  (units    

(unknown)



                    date)                         adopted?: no unknown)  

 

           (unknown)  (no       (unknown)  (unknown)  Infection  (units    (unkn

own)



                    date)                         History   unknown)  

 

           (unknown)  (no       (unknown)  (unknown)  Infectious  (units    (unk

nown)



                    date)                         Disease Education unknown)  

 

           (unknown)  (no       (unknown)  (unknown)  Infectious  (units    (unk

nown)



                    date)                         disease exposure: unknown)  



                                                  chicken pox           



                                                  immunity            



                                                  discussed,           



                                                  hepatitis risk           

 

           (unknown)  (no       (unknown)  (unknown)  Infertility  (units    (un

known)



                    date)                         ()   unknown)  

 

           (unknown)  (no       (unknown)  (unknown)  Initial Weight:  (units   

 (unknown)



                    date)                         155 lb    unknown)  

 

           (unknown)  (no       (unknown)  (unknown)  Initials  (units    (unkno

wn)



                    date)                                   unknown)  

 

           (unknown)  (no       (unknown)  (unknown)  Intake Clinical  (units   

 (unknown)



                    date)                         Staff     unknown)  

 

           (unknown)  (no       (unknown)  (unknown)  Intake Note:  (units    (u

nknown)



                    date)                                   unknown)  

 

           (unknown)  (no       (unknown)  (unknown)  Intake performed  (units  

  (unknown)



                    date)                         by:       unknown)  



                                                  Edelmira Montero           

 

           (unknown)  (no       (unknown)  (unknown)  Intake    (units    (unkno

wn)



                    date)                                   unknown)  

 

           (unknown)  (no       (unknown)  (unknown)  Irregular  (units    (unkn

own)



                    date)                         menstrual cycle unknown)  



                                                  ()             

 

           (unknown)  (no       (unknown)  (unknown)  LM        (units    (unkno

wn)



                    date)                                   unknown)  

 

           (unknown)  (no       (unknown)  (unknown)  Lipoma    (units    (unkno

wn)



                    date)                                   unknown)  

 

           (unknown)  (no       (unknown)  (unknown)  Live with  (units    (unkn

own)



                    date)                         someone with TB unknown)  



                                                  or exposed to TB:           



                                                  No                  

 

           (unknown)  (no       (unknown)  (unknown)  Loc: [facility]  (units    (unkno

wn)



                    date)                                   unknown)  

 

           (unknown)  (no       (unknown)  (unknown)  Marital status:  (units   

 (unknown)



                    date)                            unknown)  

 

           (unknown)  (no       (unknown)  (unknown)  Medical History  (units   

 (unknown)



                    date)                         (Updated 23 unknown)  



                                                  @ 16:18 by           



                                                  Julieta Au MD)                 

 

           (unknown)  (no       (unknown)  (unknown)  Medications  (units    (un

known)



                    date)                                   unknown)  

 

           (unknown)  (no       (unknown)  (unknown)  Medications:  (units    (u

nknown)



                    date)                                   unknown)  

 

           (unknown)  (no       (unknown)  (unknown)  Mother Gastric  (units    

(unknown)



                    date)                         cancer    unknown)  

 

           (unknown)  (no       (unknown)  (unknown)  N No 142 13 N/A  (units   

 (unknown)



                    date)                         4wk       unknown)  

 

           (unknown)  (no       (unknown)  (unknown)  N No no 143 17  (units    

(unknown)



                    date)                         N/A absent AFP 4 unknown)  



                                                  wks                 

 

           (unknown)  (no       (unknown)  (unknown)  N No no 178 9  (units    (

unknown)



                    date)                         N/A absent unknown)  



                                                  long/closed AGA 9           

 

           (unknown)  (no       (unknown)  (unknown)  N Yes no 135 34  (units   

 (unknown)



                    date)                         Vertex absent 2 unknown)  



                                                  wks                 

 

           (unknown)  (no       (unknown)  (unknown)  N Yes no 141 24  (units   

 (unknown)



                    date)                         N/A absent 4 wks unknown)  

 

           (unknown)  (no       (unknown)  (unknown)  N Yes no 142 20  (units   

 (unknown)



                    date)                         N/A absent AFP unknown)  



                                                  negative            

 

           (unknown)  (no       (unknown)  (unknown)  N Yes no 144 29  (units   

 (unknown)



                    date)                         Vertex absent AGA unknown)  



                                                  29w5d               

 

           (unknown)  (no       (unknown)  (unknown)  N Yes no 150 32  (units   

 (unknown)



                    date)                         Vertex absent 2 unknown)  



                                                  wks                 

 

           (unknown)  (no       (unknown)  (unknown)  NF        (units    (unkno

wn)



                    date)                                   unknown)  

 

           (unknown)  (no       (unknown)  (unknown)  New       (units    (unkno

wn)



                    date)                                   unknown)  

 

           (unknown)  (no       (unknown)  (unknown)  Notes     (units    (unkno

wn)



                    date)                                   unknown)  

 

           (unknown)  (no       (unknown)  (unknown)  Number of Living  (units  

  (unknown)



                    date)                         Children 0 unknown)  

 

           (unknown)  (no       (unknown)  (unknown)  Number of  (units    (unkn

own)



                    date)                         fetuses:: Single unknown)  

 

           (unknown)  (no       (unknown)  (unknown)  Nutrition and  (units    (

unknown)



                    date)                         weight gain unknown)  



                                                  counseling:           



                                                  special diet:           



                                                  discussed           

 

           (unknown)  (no       (unknown)  (unknown)  OB Office Visit  (units   

 (unknown)



                    date)                                   unknown)  

 

           (unknown)  (no       (unknown)  (unknown)  OB Visit Log  (units    (u

nknown)



                    date)                                   unknown)  

 

           (unknown)  (no       (unknown)  (unknown)  OB check  (units    (unkno

wn)



                    date)                                   unknown)  

 

           (unknown)  (no       (unknown)  (unknown)  On U/S: AGA  (units    (un

known)



                    date)                         29w5d. 3#4oz unknown)  



                                                  66%ile. Nl AFV.           



                                                  Plan: Metformin           



                                                  500mg BID. F/U 3           

 

           (unknown)  (no       (unknown)  (unknown)  On birth control  (units  

  (unknown)



                    date)                         at conception?: unknown)  



                                                  No                  

 

           (unknown)  (no       (unknown)  (unknown)  Other Estimates  (units   

 (unknown)



                    date)                         23 LMP unknown)  



                                                  (Certain) 35w 6d           

 

           (unknown)  (no       (unknown)  (unknown)  Ovarian cyst  (units    (u

nknown)



                    date)                         ()   unknown)  

 

           (unknown)  (no       (unknown)  (unknown)  PCOS (polycystic  (units  

  (unknown)



                    date)                         ovarian syndrome) unknown)  

 

           (unknown)  (no       (unknown)  (unknown)  PFSH      (units    (unkno

wn)



                    date)                                   unknown)  

 

           (unknown)  (no       (unknown)  (unknown)  Pap performed?:  (units   

 (unknown)



                    date)                         No        unknown)  

 

           (unknown)  (no       (unknown)  (unknown)  Para 0    (units    (unkno

wn)



                    date)                         Spontaneous unknown)  



                                                  abortions 7           

 

           (unknown)  (no       (unknown)  (unknown)  Partner history  (units   

 (unknown)



                    date)                         of STD: denies hx unknown)  

 

           (unknown)  (no       (unknown)  (unknown)  Partner history  (units   

 (unknown)



                    date)                         of genital unknown)  



                                                  herpes: No           

 

           (unknown)  (no       (unknown)  (unknown)  Partner: [place]  (units    (

unknown)



                    date)                         [place]  unknown)  

 

           (unknown)  (no       (unknown)  (unknown)  Patient comes in  (units  

  (unknown)



                    date)                         for follow-up OB unknown)  



                                                  visit. She had           



                                                  some pelvic pain           



                                                  that                

 

           (unknown)  (no       (unknown)  (unknown)  Patient presents  (units  

  (unknown)



                    date)                         for a new OB unknown)  



                                                  visit at 8 weeks           



                                                  gestation. She is           

 

           (unknown)  (no       (unknown)  (unknown)  Patient presents  (units  

  (unknown)



                    date)                         for a routine unknown)  



                                                  prenatal visit,           



                                                  accompanied by           



                                                  her .           

 

           (unknown)  (no       (unknown)  (unknown)  Patient's age 33  (units  

  (unknown)



                    date)                         years or older as unknown)  



                                                  of estimated date           



                                                  of delivery: No           

 

           (unknown)  (no       (unknown)  (unknown)  Patient:  (units    (unkno

wn)



                    date)                         Libra Prieto unknown)  



                                                  MR#: [id number]            

 

           (unknown)  (no       (unknown)  (unknown)  Pediatrician:  (units    (

unknown)



                    date)                         [facility] [place] vs unknown)  



                                                  [facility]           



                                                  [facility]           



                                                  [place]             

 

           (unknown)  (no       (unknown)  (unknown)  Personal history  (units  

  (unknown)



                    date)                         of STD: denies hx unknown)  

 

           (unknown)  (no       (unknown)  (unknown)  Personal history  (units  

  (unknown)



                    date)                         of genital unknown)  



                                                  herpes: No           

 

           (unknown)  (no       (unknown)  (unknown)  Plantar   (units    (unkno

wn)



                    date)                         fasciitis unknown)  

 

           (unknown)  (no       (unknown)  (unknown)  Position Sitting  (units  

  (unknown)



                    date)                                   unknown)  

 

           (unknown)  (no       (unknown)  (unknown)  Postpartum  (units    (unk

nown)



                    date)                         family    unknown)  



                                                  planning/Tubal           



                                                  sterilization:           



                                                  further             



                                                  discussion needed           

 

           (unknown)  (no       (unknown)  (unknown)  Pregnancy  (units    (unkn

own)



                    date)                         History   unknown)  

 

           (unknown)  (no       (unknown)  (unknown)  Pregnancy type::  (units  

  (unknown)



                    date)                         Other Normal unknown)  



                                                  Pregnancy           

 

           (unknown)  (no       (unknown)  (unknown)  Prenatal  (units    (unkno

wn)



                    date)                         Education unknown)  

 

           (unknown)  (no       (unknown)  (unknown)  Prenatal Initial  (units  

  (unknown)



                    date)                         Assessment unknown)  

 

           (unknown)  (no       (unknown)  (unknown)  Prenatal  (units    (unkno

wn)



                    date)                         Specific  unknown)  



                                                  Issues/Plans           

 

           (unknown)  (no       (unknown)  (unknown)  Prenatal  (units    (unkno

wn)



                    date)                         Testing:  unknown)  



                                                  discussed           

 

           (unknown)  (no       (unknown)  (unknown)  Prenatal Visit  (units    

(unknown)



                    date)                                   unknown)  

 

           (unknown)  (no       (unknown)  (unknown)  Prenatal  (units    (unkno

wn)



                    date)                         education packet: unknown)  



                                                  Child birth           



                                                  education/plan,           



                                                  Pregnancy           



                                                  symptoms,           

 

           (unknown)  (no       (unknown)  (unknown)  Primary Care  (units    (u

nknown)



                    date)                         Provider: [facility] [place] unknown)  



                                                  [place]              

 

           (unknown)  (no       (unknown)  (unknown)  Primary Ob  (units    (unk

nown)



                    date)                         Provider: unknown)  



                                                  Julieta Au           

 

           (unknown)  (no       (unknown)  (unknown)  Prior     (units    (unkno

wn)



                    date)                         GBS-Infected unknown)  



                                                  child: No           

 

           (unknown)  (no       (unknown)  (unknown)  Providers  (units    (unkn

own)



                    date)                                   unknown)  

 

           (unknown)  (no       (unknown)  (unknown)  Pt presents for  (units   

 (unknown)



                    date)                         a PNV at 16+6 unknown)  



                                                  wks. No FM. No           



                                                  LOF/VB. Rec'd flu           



                                                  shot                

 

           (unknown)  (no       (unknown)  (unknown)  Pt presents for  (units   

 (unknown)



                    date)                         a routine PNV at unknown)  



                                                  28 +6 wks           



                                                  gestation.           



                                                  Abnormal 3 hr           



                                                  GTT.                

 

           (unknown)  (no       (unknown)  (unknown)  Rash or viral  (units    (

unknown)



                    date)                         illness since unknown)  



                                                  last menstrual           



                                                  period: No           

 

           (unknown)  (no       (unknown)  (unknown)  Rash      (units    (unkno

wn)



                    date)                                   unknown)  

 

           (unknown)  (no       (unknown)  (unknown)  Reason For Visit  (units  

  (unknown)



                    date)                                   unknown)  

 

           (unknown)  (no       (unknown)  (unknown)  Recent travel  (units    (

unknown)



                    date)                         outside of unknown)  



                                                  country?: No           

 

           (unknown)  (no       (unknown)  (unknown)  Recurrent  (units    (unkn

own)



                    date)                         pregnancy loss or unknown)  



                                                  a stillbirth: Yes           

 

           (unknown)  (no       (unknown)  (unknown)  Reports over the  (units  

  (unknown)



                    date)                         counter   unknown)  



                                                  medications           



                                                  (diclofenac),           



                                                  Denies alcohol,           



                                                  Denies              

 

           (unknown)  (no       (unknown)  (unknown)  Safety    (units    (unkno

wn)



                    date)                                   unknown)  

 

           (unknown)  (no       (unknown)  (unknown)  Second Trimester  (units  

  (unknown)



                    date)                         Education unknown)  



                                                  Checklist           

 

           (unknown)  (no       (unknown)  (unknown)  Selecting a  (units    (un

known)



                    date)                          care unknown)  



                                                  provider: further           



                                                  discussion needed           

 

           (unknown)  (no       (unknown)  (unknown)  She is at 22  (units    (u

nknown)



                    date)                         weeks 6 days. She unknown)  



                                                  has felt good           



                                                  fetal movement.           



                                                  She says it is           

 

           (unknown)  (no       (unknown)  (unknown)  Shingles  (units    (unkno

wn)



                    date)                                   unknown)  

 

           (unknown)  (no       (unknown)  (unknown)  Signed By:  (units    (unk

nown)



                    date)                                   unknown)  

 

           (unknown)  (no       (unknown)  (unknown)  Signs and  (units    (unkn

own)



                    date)                         symptoms of unknown)  



                                                   labor:           



                                                  discussed           

 

           (unknown)  (no       (unknown)  (unknown)  Smoking Status:  (units   

 (unknown)



                    date)                         Never smoker unknown)  

 

           (unknown)  (no       (unknown)  (unknown)  Social History  (units    

(unknown)



                    date)                                   unknown)  

 

           (unknown)  (no       (unknown)  (unknown)  Support   (units    (unkno

wn)



                    date)                         Person(s):: [place] unknown)  

 

           (unknown)  (no       (unknown)  (unknown)  Surgical History  (units  

  (unknown)



                    date)                         (Updated 09/10/22 unknown)  



                                                  @ 21:46 by Fatoumata Flores)             

 

           (unknown)  (no       (unknown)  (unknown)  Surrogate  (units    (unkn

own)



                    date)                         pregnancy?: no unknown)  

 

           (unknown)  (no       (unknown)  (unknown)  Symptoms since  (units    

(unknown)



                    date)                         LMP: Reports unknown)  



                                                  amenorrhea,           



                                                  nausea, fatigue,           



                                                  breast              



                                                  tenderness,           

 

           (unknown)  (no       (unknown)  (unknown)  Take 1 1/2 tabs  (units   

 (unknown)



                    date)                         twice a day 750 unknown)  



                                                  mg (1.5 x 500 mg)           



                                                  PO BID 90 tabs           



                                                  1RF                 

 

           (unknown)  (no       (unknown)  (unknown)  Teratogen  (units    (unkn

own)



                    date)                         Exposures since unknown)  



                                                  LMP/Conception:           



                                                  Denies              



                                                  prescription           



                                                  medications,           

 

           (unknown)  (no       (unknown)  (unknown)  Testing   (units    (unkno

wn)



                    date)                         Education unknown)  

 

           (unknown)  (no       (unknown)  (unknown)  Testing   (units    (unkno

wn)



                    date)                         education unknown)  



                                                  completed: group           



                                                  B strep, Spina           



                                                  bifida testing           



                                                  and Cell Free           

 

           (unknown)  (no       (unknown)  (unknown)  This note may  (units    (

unknown)



                    date)                         have been all or unknown)  



                                                  partially           



                                                  generated using           



                                                  voice recognition           

 

           (unknown)  (no       (unknown)  (unknown)  Tobacco +  (units    (unkn

own)



                    date)                         Substance Use unknown)  

 

           (unknown)  (no       (unknown)  (unknown)  Tobacco Status  (units    

(unknown)



                    date)                                   unknown)  

 

           (unknown)  (no       (unknown)  (unknown)  Trimester:: 3rd  (units   

 (unknown)



                    date)                         Trimester unknown)  



                                                  (28wks-Del)           

 

           (unknown)  (no       (unknown)  (unknown)  Tumor (-10/2012)  (units  

  (unknown)



                    date)                                   unknown)  

 

           (unknown)  (no       (unknown)  (unknown)  Type(s) of  (units    (unk

nown)



                    date)                         exercise: unknown)  



                                                  bicycling           



                                                  (stationary           



                                                  bike), regular           



                                                  exercise, weight           

 

           (unknown)  (no       (unknown)  (unknown) UProtein Movement  (units  

  (unknown)



                    date)                         PreLabor FHR Fndl unknown)  



                                                  Ht Pres Edema           



                                                  Cerv Exam           



                                                  US/Comment Next           



                                                  Appt                

 

           (unknown)  (no       (unknown)  (unknown)  Ultrasound  (units    (unk

nown)



                    date)                         performed?: No unknown)  

 

           (unknown)  (no       (unknown)  (unknown)  Varicella/chicke  (units  

  (unknown)



                    date)                         n pox status: unknown)  



                                                  previous disease           

 

           (unknown)  (no       (unknown)  (unknown)  Visit Date:  (units    (un

known)



                    date)                         23 Last unknown)  



                                                  Updated by:           



                                                  Julieta Au MD                  

 

           (unknown)  (no       (unknown)  (unknown)  Visit Date:  (units    (un

known)



                    date)                         23 Last unknown)  



                                                  Updated by:           



                                                  Julieta Au MD                  

 

           (unknown)  (no       (unknown)  (unknown)  Visit Date:  (units    (un

known)



                    date)                         22 Last unknown)  



                                                  Updated by:           



                                                  Julieta Au MD                  

 

           (unknown)  (no       (unknown)  (unknown)  Visit Date:  (units    (un

known)



                    date)                         10/11/22 Last unknown)  



                                                  Updated by:           



                                                  Fanny Baker MD                  

 

           (unknown)  (no       (unknown)  (unknown)  Visit Date:  (units    (un

known)



                    date)                         22 Last unknown)  



                                                  Updated by:           



                                                  Julieta Au MD                  

 

           (unknown)  (no       (unknown)  (unknown)  Visit Date:  (units    (un

known)



                    date)                         22 Last unknown)  



                                                  Updated by: Berta Salinas P.A-C           

 

           (unknown)  (no       (unknown)  (unknown)  Visit Reasons:  (units    

(unknown)



                    date)                         OB        unknown)  

 

           (unknown)  (no       (unknown)  (unknown)  Vitals    (units    (unkno

wn)



                    date)                                   unknown)  

 

           (unknown)  (no       (unknown)  (unknown)  Vitamins and  (units    (u

nknown)



                    date)                         iron, Diet and unknown)  



                                                  weight gain, Fish           



                                                  and mercury           



                                                  intake, Caffeine           



                                                  use,                

 

           (unknown)  (no       (unknown)  (unknown)  WG        (units    (unkno

wn)



                    date)                                   unknown)  

 

           (unknown)  (no       (unknown)  (unknown)  Weeks     (units    (unkno

wn)



                    date)                         gestation:: 33 unknown)  

 

           (unknown)  (no       (unknown)  (unknown)  Weight 178 lb  (units    (

unknown)



                    date)                                   unknown)  

 

           (unknown)  (no       (unknown)  (unknown)  [place] teeth  (units    (u

nknown)



                    date)                         extracted unknown)  

 

           (unknown)  (no       (unknown)  (unknown)  Zika virus  (units    (unk

nown)



                    date)                         exposure: No unknown)  

 

           (unknown)  (no       (unknown)  (unknown)  accompanied by  (units    

(unknown)



                    date)                         her . She unknown)  



                                                  went through           



                                                  fertility           



                                                  treatments with           

 

           (unknown)  (no       (unknown)  (unknown)  alcohol intake:  (units   

 (unknown)



                    date)                         never     unknown)  

 

           (unknown)  (no       (unknown)  (unknown)  anterior  (units    (unkno

wn)



                    date)                         placenta. Routine unknown)  



                                                  precautions           



                                                  reviewed with the           



                                                  patient. Due to           



                                                  1st                 

 

           (unknown)  (no       (unknown)  (unknown)  anyone in either  (units  

  (unknown)



                    date)                         family with: unknown)  

 

           (unknown)  (no       (unknown)  (unknown)  baby's father  (units    (

unknown)



                    date)                         had a child with unknown)  



                                                  birth defects not           



                                                  listed above and           



                                                  Denies Other           

 

           (unknown)  (no       (unknown)  (unknown)  back pain.  (units    (k

)



                    date)                         Advised   unknown)  



                                                  stretching, belly           



                                                  band, and PT if           



                                                  not improving.           



                                                  AFP was             

 

           (unknown)  (no       (unknown)  (unknown)  blood sugar  (units    (un

known)



                    date)                         diagnostic (Blood unknown)  



                                                  Glucose Test           



                                                  strips) #100 ea           



                                                  23 [Rx           

 

           (unknown)  (no       (unknown)  (unknown)  blood-glucose  (units    (

unknown)



                    date)                         meter #1 ea unknown)  



                                                  23 [Rx           



                                                  Confirmed           



                                                  23]           

 

           (unknown)  (no       (unknown)  (unknown)  by ultrasound is  (units  

  (unknown)



                    date)                         normal with good unknown)  



                                                  fetal movement,           



                                                  normal appearing           



                                                  fluid,              

 

           (unknown)  (no       (unknown)  (unknown)  caffeine: Yes  (units    (

unknown)



                    date)                         (1-2 cups unknown)  



                                                  tea/day)            

 

           (unknown)  (no       (unknown)  (unknown)  carbon monox  (units    (u

nknown)



                    date)                         detector in home: unknown)  



                                                  Yes                 

 

           (unknown)  (no       (unknown)  (unknown)  cfDNA normal  (units    (u

nknown)



                    date)                         female, AFP unknown)  



                                                  negative            

 

           (unknown)  (no       (unknown)  (unknown)  consistent with  (units   

 (unknown)



                    date)                         9 weeks 0 days. unknown)  



                                                  Yolk sac visible.           



                                                  Fetal heart rate           



                                                  178 beats           

 

           (unknown)  (no       (unknown)  (unknown)  current   (units    (unkno

wn)



                    date)                         occupational unknown)  



                                                  exposures/hazards           



                                                  : No                

 

           (unknown)  (no       (unknown)  (unknown)  daily servings  (units    

(unknown)



                    date)                         fruits/ve-4 unknown)  

 

           (unknown)  (no       (unknown)  (unknown)  described, visit  (units  

  (unknown)



                    date)                         schedule  unknown)  



                                                  reviewed,           



                                                  ultrasounds           



                                                  policy reviewed,           



                                                  coverage 24           

 

           (unknown)  (no       (unknown)  (unknown)  developing a  (units    (u

nknown)



                    date)                         pattern. No unknown)  



                                                  leakage of fluid           



                                                  or vaginal           



                                                  bleeding. No           



                                                  contractions           

 

           (unknown)  (no       (unknown)  (unknown)  discussed,  (units    (unk

nown)



                    date)                         tuberculosis unknown)  



                                                  exposure            



                                                  discussed, CMV           



                                                  discussed,           



                                                  Toxoplasmosis           

 

           (unknown)  (no       (unknown)  (unknown)  disorders,  (units    (unk

nown)



                    date)                         Denies Cystic unknown)  



                                                  Fibrosis, Denies           



                                                  Mental              



                                                  Retardation/Autis           



                                                  m, Denies           

 

           (unknown)  (no       (unknown)  (unknown)  do you feel safe  (units  

  (unknown)



                    date)                         at home: Yes unknown)  

 

           (unknown)  (no       (unknown)  (unknown)  during the past  (units   

 (unknown)



                    date)                         year weight has: unknown)  



                                                  remained stable           

 

           (unknown)  (no       (unknown)  (unknown)  education level:  (units  

  (unknown)



                    date)                         college   unknown)  



                                                  (Associate's           



                                                  degree)             

 

           (unknown)  (no       (unknown)  (unknown)  [employer]  (units   

 (unknown)



                    date)                         ([employer] unknown)  



                                                  [employer],           



                                                  [place]),           



                                                  Den[place]           

 

           (unknown)  (no       (unknown)  (unknown)  exposure,  (units    (unkn

own)



                    date)                         Medication use, unknown)  



                                                  Sauna/hot tub           



                                                  use, Dental care,           



                                                  Travel and           



                                                  Influenza           

 

           (unknown)  (no       (unknown)  (unknown)  fire      (units    (unkno

wn)



                    date)                         extinguisher in unknown)  



                                                  home: Yes           

 

           (unknown)  (no       (unknown)  (unknown)  firearms in  (units    (un

known)



                    date)                         home: Yes unknown)  



                                                  firearms unloaded           



                                                  and locked: Yes           

 

           (unknown)  (no       (unknown)  (unknown)  frequency: 5-6  (units    

(unknown)



                    date)                         times per week unknown)  

 

           (unknown)  (no       (unknown)  (unknown)  gestational sac  (units   

 (unknown)



                    date)                         with a fetus with unknown)  



                                                  a crown-rump           



                                                  length measuring           



                                                  2.29 cm             

 

           (unknown)  (no       (unknown)  (unknown)  have occurred.  (units    

(unknown)



                    date)                         If there are any unknown)  



                                                  questions, please           



                                                  contact the           



                                                  Medical Records           

 

           (unknown)  (no       (unknown)  (unknown)  hours a day and  (units   

 (unknown)



                    date)                         participation of unknown)  



                                                  father in           



                                                  prenatal care and           



                                                  office visits           

 

           (unknown)  (no       (unknown)  (unknown)  household  (units    (unkn

own)



                    date)                         members: spouse unknown)  



                                                  and family           



                                                  (father and           



                                                  father-in-law)           

 

           (unknown)  (no       (unknown)  (unknown)  housing: house  (units    

(unknown)



                    date)                                   unknown)  

 

           (unknown)  (no       (unknown)  (unknown)  illicit drugs  (units    (

unknown)



                    date)                         and Denies other unknown)  

 

           (unknown)  (no       (unknown)  (unknown)  kag       (units    (unkno

wn)



                    date)                                   unknown)  

 

           (unknown)  (no       (unknown)  (unknown)  lancets #100 ea  (units   

 (unknown)



                    date)                         23 [Rx unknown)  



                                                  Confirmed           



                                                  23]           

 

           (unknown)  (no       (unknown)  (unknown)  last visit.  (units    (un

known)



                    date)                         Plan: AFP today. unknown)  



                                                  20 wk u/s           



                                                  reviewed. F/U 4           



                                                  wks. Warning           



                                                  signs               

 

           (unknown)  (no       (unknown)  (unknown)  lifting and  (units    (un

known)



                    date)                         other (hiking) unknown)  

 

           (unknown)  (no       (unknown)  (unknown)  lives     (units    (unkno

wn)



                    date)                         independently: unknown)  



                                                  Yes                 

 

           (unknown)  (no       (unknown)  (unknown)  marital status:  (units   

 (unknown)



                    date)                            unknown)  

 

           (unknown)  (no       (unknown)  (unknown)  may occur.  (units    (unk

nown)



                    date)                         Occasional unknown)  



                                                  wrong-word or           



                                                  'sound-alike'           



                                                  substitutions may           



                                                  have                

 

           (unknown)  (no       (unknown)  (unknown)  medication only.  (units  

  (unknown)



                    date)                         Had 7     unknown)  



                                                  miscarriages. No           



                                                  bleeding with           



                                                  this pregnancy.           



                                                  This 1              

 

           (unknown)  (no       (unknown)  (unknown)  metformin 500 mg  (units  

  (unknown)



                    date)                         tablet 500 mg PO unknown)  



                                                  BID #60 tabs           



                                                  23 [Rx           



                                                  Confirmed           



                                                  23]           

 

           (unknown)  (no       (unknown)  (unknown)  metformin 500 mg  (units  

  (unknown)



                    date)                         tablet 750 mg PO unknown)  



                                                  BID #90 tabs           



                                                  23 [Rx           



                                                  Confirmed           



                                                  23]           

 

           (unknown)  (no       (unknown)  (unknown)  metformin  (units    (unkn

own)



                    date)                                   unknown)  

 

           (unknown)  (no       (unknown)  (unknown)  movement and  (units    (u

nknown)



                    date)                         denies VB, LOF, unknown)  



                                                  cramping and           



                                                  regular             



                                                  contractions. Pt           



                                                  reports low           

 

           (unknown)  (no       (unknown)  (unknown)  negative.  (units    (unkn

own)



                    date)                         Anatomy US unknown)  



                                                  scheduled for           



                                                  later today.           



                                                  Warning             



                                                  precautions           



                                                  reviewed.           

 

           (unknown)  (no       (unknown)  (unknown)  nickel Allergy  (units    

(unknown)



                    date)                         (Mild, Verified unknown)  



                                                  23 15:40)           

 

           (unknown)  (no       (unknown)  (unknown)  number of  (units    (unkn

own)



                    date)                         children: 0 unknown)  

 

           (unknown)  (no       (unknown)  (unknown)  occupational  (units    (u

nknown)



                    date)                         status: employed unknown)  



                                                  (active duty           



                                                  avionics            



                                                  ,           



                                                  [employer] job            

 

           (unknown)  (no       (unknown)  (unknown)  occurred due to  (units   

 (unknown)



                    date)                         the inherent unknown)  



                                                  limitations of           



                                                  voice recognition           



                                                  software. Please           

 

           (unknown)  (no       (unknown)  (unknown)  or cramping.  (units    (u

nknown)



                    date)                         Plan: 1 hour unknown)  



                                                  glucose ordered.           



                                                  Follow-up in 4           



                                                  weeks. Warning           

 

           (unknown)  (no       (unknown)  (unknown)  per minute.  (units    (un

known)



                    date)                         Normal ovaries unknown)  



                                                  bilaterally.           



                                                  Plan: Follow-up           



                                                  in 4 weeks.           



                                                  Warning             

 

           (unknown)  (no       (unknown)  (unknown)  pets and  (units    (unkno

wn)



                    date)                         animals: Yes (1 unknown)  



                                                  large dog,           



                                                  chickens)           

 

           (unknown)  (no       (unknown)  (unknown)  precautions,  (units    (u

nknown)



                    date)                         Listeriosis unknown)  



                                                  prevention and           



                                                  Rubella             



                                                  Immunization           

 

           (unknown)  (no       (unknown)  (unknown)  preeclampsia.  (units    (

unknown)



                    date)                                   unknown)  

 

           (unknown)  (no       (unknown)  (unknown)  pregnancy  (units    (unkn

own)



                    date)                         discussed unknown)  



                                                  starting baby           



                                                  aspirin per day           



                                                  to decrease her           



                                                  risk for            

 

           (unknown)  (no       (unknown)  (unknown)  prenat.vits,nan,  (units  

  (unknown)



                    date)                         min-iron-folic 1 unknown)  



                                                  tab PO DAILY           



                                                  22 [History           



                                                  Confirmed           

 

           (unknown)  (no       (unknown)  (unknown)  read the note  (units    (

unknown)



                    date)                         carefully and unknown)  



                                                  recognize, using           



                                                  context, where           



                                                  these               



                                                  substitutions           

 

           (unknown)  (no       (unknown)  (unknown)  reviewed.  (units    (unkn

own)



                    date)                                   unknown)  

 

           (unknown)  (no       (unknown)  (unknown)  seatbelt use:  (units    (

unknown)



                    date)                         always    unknown)  

 

           (unknown)  (no       (unknown)  (unknown)  second hand  (units    (un

known)



                    date)                         exposure: Yes unknown)  



                                                  (father-in-law           



                                                  smokes outside           



                                                  the house)           

 

           (unknown)  (no       (unknown)  (unknown)  signs reviewed.  (units   

 (unknown)



                    date)                         cfDNA ordered. unknown)  

 

           (unknown)  (no       (unknown)  (unknown)  signs reviewed.  (units   

 (unknown)



                    date)                                   unknown)  

 

           (unknown)  (no       (unknown)  (unknown)  software.  (units    (unkn

own)



                    date)                         Although every unknown)  



                                                  effort is made to           



                                                  edit content,           



                                                  transcription           



                                                  errors              

 

           (unknown)  (no       (unknown)  (unknown)  special madi  (units    (

unknown)



                    date)                         needs: No unknown)  

 

           (unknown)  (no       (unknown)  (unknown)  substance use  (units    (

unknown)



                    date)                         type: does not unknown)  



                                                  use                 

 

           (unknown)  (no       (unknown)  (unknown)  travel history:  (units   

 (unknown)



                    date)                         over 6 months ago unknown)  

 

           (unknown)  (no       (unknown)  (unknown)  urinary   (units    (unkno

wn)



                    date)                         frequency and unknown)  



                                                  irritability           

 

           (unknown)  (no       (unknown)  (unknown)  vaccine (Annual  (units   

 (unknown)



                    date)                         flu, Phizer Covid unknown)  



                                                  x2)                 

 

           (unknown)  (no       (unknown)  (unknown)  w0d 4 wks  (units    (unkn

own)



                    date)                                   unknown)  

 

           (unknown)  (no       (unknown)  (unknown)  was not using  (units    (

unknown)



                    date)                         any infertility unknown)  



                                                  medications.           



                                                  Ultrasound: An           



                                                  intrauterine           

 

           (unknown)  (no       (unknown)  (unknown)  water heater  (units    (u

nknown)



                    date)                         temp set < 120 unknown)  



                                                  deg: Yes            

 

           (unknown)  (no       (unknown)  (unknown)  well-balanced  (units    (

unknown)



                    date)                         diet: daily or unknown)  



                                                  most days           

 

           (unknown)  (no       (unknown)  (unknown)  went away with  (units    

(unknown)



                    date)                         laying down. No unknown)  



                                                  vaginal bleeding.           



                                                  No fevers. Fetal           



                                                  heart tone           

 

           (unknown)  (no       (unknown)  (unknown)  while pregnant)  (units   

 (unknown)



                    date)                                   unknown)  

 

           (unknown)  (no       (unknown)  (unknown)  wks. Warning  (units    (u

nknown)



                    date)                         signs for PTL unknown)  



                                                  reviewed.           

 

           (unknown)  (no       (unknown)  (unknown)  working smoke  (units    (

unknown)



                    date)                         detector in home: unknown)  



                                                  Yes                 









                                         Result panel 24









           (unknown)  (no       (unknown)  (unknown)  (no value)  (units    (unk

nown)



                    date)                                   unknown)  

 

           (unknown)  (no       (unknown)  (unknown)  (Gestational,  (units    (

unknown)



                    date)                         on metformin) unknown)  

 

           (unknown)  (no       (unknown)  (unknown)  23 1738  (units    (

unknown)



                    date)                                   unknown)  

 

           (unknown)  (no       (unknown)  (unknown)  [id number]   (units    (unkno

wn)



                    date)                                   unknown)  

 

           (unknown)  (no       (unknown)  (unknown)  28-year-old  (units    (un

known)



                    date)                          8 para 0 unknown)  



                                                  at 33-,5/7 weeks           



                                                  gestation           

 

           (unknown)  (no       (unknown)  (unknown)  Age/Sex: 28 / F  (units   

 (unknown)



                    date)                                   unknown)  

 

           (unknown)  (no       (unknown)  (unknown)  Allergies  (units    (unkn

own)



                    date)                         (-)   unknown)  

 

           (unknown)  (no       (unknown)  (unknown)  Anesthesia  (units    (unk

nown)



                    date)                                   unknown)  

 

           (unknown)  (no       (unknown)  (unknown)  Anxiety (-2016)  (units   

 (unknown)



                    date)                                   unknown)  

 

           (unknown)  (no       (unknown)  (unknown)  Arrhythmia  (units    (unk

nown)



                    date)                                   unknown)  

 

           (unknown)  (no       (unknown)  (unknown)  Assessment:  (units    (un

known)



                    date)                                   unknown)  

 

           (unknown)  (no       (unknown)  (unknown)  Baseline fetal  (units    

(unknown)



                    date)                         heart rate: 135 unknown)  

 

           (unknown)  (no       (unknown)  (unknown)  Chicken pox  (units    (un

known)



                    date)                         ()   unknown)  

 

           (unknown)  (no       (unknown)  (unknown)  : 1995  (units   

 (unknown)



                    date)                         Acct:[id number] unknown)  

 

           (unknown)  (no       (unknown)  (unknown)  Date of   (units    (unkno

wn)



                    date)                         Service:  unknown)  



                                                  23            

 

           (unknown)  (no       (unknown)  (unknown)  Date of   (units    (unkno

wn)



                    date)                         evaluation: unknown)  



                                                  23            

 

           (unknown)  (no       (unknown)  (unknown)  Depression  (units    (unk

nown)



                    date)                         ()   unknown)  

 

           (unknown)  (no       (unknown)  (unknown)  Diagnosis,  (units    (unk

nown)



                    date)                         Plan/Disposition unknown)  

 

           (unknown)  (no       (unknown)  (unknown)  Eczema (-)  (units    

(unknown)



                    date)                                   unknown)  

 

           (unknown)  (no       (unknown)  (unknown)  Evaluation  (units    (unk

nown)



                    date)                                   unknown)  

 

           (unknown)  (no       (unknown)  (unknown)  Family History  (units    

(unknown)



                    date)                         (Updated  unknown)  



                                                  09/10/22 @ 21:49           



                                                  by Fatoumata Flores)           

 

           (unknown)  (no       (unknown)  (unknown)  Family/Other  (units    (u

nknown)



                    date)                         Multiple  unknown)  



                                                  sclerosis           

 

           (unknown)  (no       (unknown)  (unknown)  Father    (units    (unkno

wn)



                    date)                         Hypertension unknown)  

 

           (unknown)  (no       (unknown)  (unknown)  Fetal Monitor  (units    (

unknown)



                    date)                         Decelerations: unknown)  



                                                  Absent              

 

           (unknown)  (no       (unknown)  (unknown)  Fetal Status:  (units    (

unknown)



                    date)                         Category l unknown)  

 

           (unknown)  (no       (unknown)  (unknown)  Fetal monitor  (units    (

unknown)



                    date)                         accelerations: unknown)  



                                                  Present             

 

           (unknown)  (no       (unknown)  (unknown)  Follow-up in 1  (units    

(unknown)



                    date)                         week      unknown)  

 

           (unknown)  (no       (unknown)  (unknown)  Foot pain  (units    (unkn

own)



                    date)                         ()   unknown)  

 

           (unknown)  (no       (unknown)  (unknown)  Gestational  (units    (un

known)



                    date)                         diabetes on unknown)  



                                                  metformin           

 

           (unknown)  (no       (unknown)  (unknown)  Grandfather  (units    (un

known)



                    date)                          Cancer unknown)  

 

           (unknown)  (no       (unknown)  (unknown)  Grandfather  (units    (un

known)



                    date)                          Stroke unknown)  

 

           (unknown)  (no       (unknown)  (unknown)  Grandmother  (units    (un

known)



                    date)                          History unknown)  



                                                  of heart disease           

 

           (unknown)  (no       (unknown)  (unknown)  Grandmother  (units    (un

known)



                    date)                           unknown)  



                                                  Multiple            



                                                  sclerosis           

 

           (unknown)  (no       (unknown)  (unknown)  History of  (units    (unk

nown)



                    date)                         recurrent unknown)  



                                                  miscarriages           

 

           (unknown)  (no       (unknown)  (unknown)  Infertility  (units    (un

known)



                    date)                         ()   unknown)  

 

           (unknown)  (no       (unknown)  (unknown)  Irregular  (units    (unkn

own)



                    date)                         menstrual cycle unknown)  



                                                  ()             

 

           (unknown)  (no       (unknown)  (unknown)  [facility]  (units   

 (unknown)



                    date)                         [address] unknown)  



                                                  [place]           



                                                  [id number]               

 

           (unknown)  (no       (unknown)  (unknown)  Labor and  (units    (unkn

own)



                    date)                         Delivery Triage unknown)  



                                                  Note                

 

           (unknown)  (no       (unknown)  (unknown)  Lipoma    (units    (unkno

wn)



                    date)                                   unknown)  

 

           (unknown)  (no       (unknown)  (unknown)  Medical History  (units   

 (unknown)



                    date)                         (Updated  unknown)  



                                                  23 @ 16:18           



                                                  by Julieta Au MD)           

 

           (unknown)  (no       (unknown)  (unknown)  Mother Gastric  (units    

(unknown)



                    date)                         cancer    unknown)  

 

           (unknown)  (no       (unknown)  (unknown)  OB Disposition:  (units   

 (unknown)



                    date)                         home      unknown)  

 

           (unknown)  (no       (unknown)  (unknown)  On-call OB  (units    (unk

nown)



                    date)                         Provider: unknown)  



                                                  Julieta Au           

 

           (unknown)  (no       (unknown)  (unknown)  Ovarian cyst  (units    (u

nknown)



                    date)                         ()   unknown)  

 

           (unknown)  (no       (unknown)  (unknown)  PCOS      (units    (unkno

wn)



                    date)                         (polycystic unknown)  



                                                  ovarian             



                                                  syndrome)           

 

           (unknown)  (no       (unknown)  (unknown)  PFSH      (units    (unkno

wn)



                    date)                                   unknown)  

 

           (unknown)  (no       (unknown)  (unknown)  Patient:  (units    (unkno

wn)



                    date)                         PrietoLibra unknown)  



                                                  MR#: [id number]            

 

           (unknown)  (no       (unknown)  (unknown)  Plan/Dispositio  (units   

 (unknown)



                    date)                         n         unknown)  

 

           (unknown)  (no       (unknown)  (unknown)  Plan:     (units    (unkno

wn)



                    date)                                   unknown)  

 

           (unknown)  (no       (unknown)  (unknown)  Plantar   (units    (unkno

wn)



                    date)                         fasciitis unknown)  

 

           (unknown)  (no       (unknown)  (unknown)  Primary OB  (units    (unk

nown)



                    date)                         Provider: unknown)  



                                                  Julieta Au           

 

           (unknown)  (no       (unknown)  (unknown)  Provider:  (units    (unkn

own)



                    date)                         Julieta Au unknown)  



                                                  MD                  

 

           (unknown)  (no       (unknown)  (unknown)  Reactive  (units    (unkno

wn)



                    date)                         nonstress test unknown)  

 

           (unknown)  (no       (unknown)  (unknown)  Reason for  (units    (unk

nown)



                    date)                         Evaluation: Yes unknown)  



                                                  non-stress test           



                                                  non-stress test           



                                                  reason: diabetes           

 

           (unknown)  (no       (unknown)  (unknown)  Shingles  (units    (unkno

wn)



                    date)                                   unknown)  

 

           (unknown)  (no       (unknown)  (unknown)  Signed    (units    (unkno

wn)



                    date)                         By:<Electronical unknown)  



                                                  ly signed by           



                                                  Julieta Au MD>           

 

           (unknown)  (no       (unknown)  (unknown)  Smoking Status:  (units   

 (unknown)



                    date)                         Never smoker unknown)  

 

           (unknown)  (no       (unknown)  (unknown)  Social History  (units    

(unknown)



                    date)                                   unknown)  

 

           (unknown)  (no       (unknown)  (unknown)  Surgical  (units    (unkno

wn)



                    date)                         History (Updated unknown)  



                                                  09/10/22 @ 21:46           



                                                  by Fatoumata Flores)           

 

           (unknown)  (no       (unknown)  (unknown)  Tumor     (units    (unkno

wn)



                    date)                         (-10/2012) unknown)  

 

           (unknown)  (no       (unknown)  (unknown)  Type(s) of  (units    (unk

nown)



                    date)                         exercise: unknown)  



                                                  bicycling           



                                                  (stationary           



                                                  bike), regular           



                                                  exercise, weight           

 

           (unknown)  (no       (unknown)  (unknown)  Variability:  (units    (u

nknown)



                    date)                         Moderate (11-25) unknown)  

 

           (unknown)  (no       (unknown)  (unknown)  Visit     (units    (unkno

wn)



                    date)                         Information unknown)  

 

           (unknown)  (no       (unknown)  (unknown)  [place] teeth  (units    (u

nknown)



                    date)                         extracted unknown)  

 

           (unknown)  (no       (unknown)  (unknown)  alcohol intake:  (units   

 (unknown)



                    date)                         never     unknown)  

 

           (unknown)  (no       (unknown)  (unknown)  caffeine: Yes  (units    (

unknown)



                    date)                         (1-2 cups unknown)  



                                                  tea/day)            

 

           (unknown)  (no       (unknown)  (unknown)  carbon monox  (units    (u

nknown)



                    date)                         detector in unknown)  



                                                  home: Yes           

 

           (unknown)  (no       (unknown)  (unknown)  current   (units    (unkno

wn)



                    date)                         occupational unknown)  



                                                  exposures/hazard           



                                                  s: No               

 

           (unknown)  (no       (unknown)  (unknown)  daily servings  (units    

(unknown)



                    date)                         fruits/ve-4 unknown)  

 

           (unknown)  (no       (unknown)  (unknown)  do you feel  (units    (un

known)



                    date)                         safe at home: unknown)  



                                                  Yes                 

 

           (unknown)  (no       (unknown)  (unknown)  during the past  (units   

 (unknown)



                    date)                         year weight has: unknown)  



                                                  remained stable           

 

           (unknown)  (no       (unknown)  (unknown)  education  (units    (unkn

own)



                    date)                         level: college unknown)  



                                                  (Associate's           



                                                  degree)             

 

           (unknown)  (no       (unknown)  (unknown)  fire      (units    (unkno

wn)



                    date)                         extinguisher in unknown)  



                                                  home: Yes           

 

           (unknown)  (no       (unknown)  (unknown)  firearms in  (units    (un

known)



                    date)                         home: Yes unknown)  



                                                  firearms            



                                                  unloaded and           



                                                  locked: Yes           

 

           (unknown)  (no       (unknown)  (unknown)  frequency: 5-6  (units    

(unknown)



                    date)                         times per week unknown)  

 

           (unknown)  (no       (unknown)  (unknown)  household  (units    (unkn

own)



                    date)                         members: spouse unknown)  



                                                  and family           



                                                  (father and           



                                                  father-in-law)           

 

           (unknown)  (no       (unknown)  (unknown)  housing: house  (units    

(unknown)



                    date)                                   unknown)  

 

           (unknown)  (no       (unknown)  (unknown)  lifting and  (units    (un

known)



                    date)                         other (hiking) unknown)  

 

           (unknown)  (no       (unknown)  (unknown)  lives     (units    (unkno

wn)



                    date)                         independently: unknown)  



                                                  Yes                 

 

           (unknown)  (no       (unknown)  (unknown)  marital status:  (units   

 (unknown)



                    date)                            unknown)  

 

           (unknown)  (no       (unknown)  (unknown)  number of  (units    (unkn

own)



                    date)                         children: 0 unknown)  

 

           (unknown)  (no       (unknown)  (unknown)  occupational  (units    (u

nknown)



                    date)                         status: employed unknown)  



                                                  (active duty           



                                                  avionics            



                                                  electronics           



                                                  tech, [employer] job           

 

           (unknown)  (no       (unknown)  (unknown)  pets and  (units    (unkno

wn)



                    date)                         animals: Yes (1 unknown)  



                                                  large dog,           



                                                  chickens)           

 

           (unknown)  (no       (unknown)  (unknown)  seatbelt use:  (units    (

unknown)



                    date)                         always    unknown)  

 

           (unknown)  (no       (unknown)  (unknown)  second hand  (units    (un

known)



                    date)                         exposure: Yes unknown)  



                                                  (father-in-law           



                                                  smokes outside           



                                                  the house)           

 

           (unknown)  (no       (unknown)  (unknown)  special madi  (units    (

unknown)



                    date)                         needs: No unknown)  

 

           (unknown)  (no       (unknown)  (unknown)  substance use  (units    (

unknown)



                    date)                         type: does not unknown)  



                                                  use                 

 

           (unknown)  (no       (unknown)  (unknown)  travel history:  (units   

 (unknown)



                    date)                         over 6 months unknown)  



                                                  ago                 

 

           (unknown)  (no       (unknown)  (unknown)  water heater  (units    (u

nknown)



                    date)                         temp set < 120 unknown)  



                                                  deg: Yes            

 

           (unknown)  (no       (unknown)  (unknown)  well-balanced  (units    (

unknown)



                    date)                         diet: daily or unknown)  



                                                  most days           

 

           (unknown)  (no       (unknown)  (unknown)  while pregnant)  (units   

 (unknown)



                    date)                                   unknown)  

 

           (unknown)  (no       (unknown)  (unknown)  working smoke  (units    (

unknown)



                    date)                         detector in unknown)  



                                                  home: Yes           









                                         Result panel 25









           (unknown)  (no       (unknown)  (unknown)  (no value)  (units    (unk

nown)



                    date)                                   unknown)  

 

           (unknown)  (no       (unknown)  (unknown)  (+12 lb) 120/58  (units   

 (unknown)



                    date)                         N         unknown)  

 

           (unknown)  (no       (unknown)  (unknown)  (+13 lb) 104/54  (units   

 (unknown)



                    date)                         N         unknown)  

 

           (unknown)  (no       (unknown)  (unknown)  (+18 lb) 122/60  (units   

 (unknown)



                    date)                         N         unknown)  

 

           (unknown)  (no       (unknown)  (unknown)  (+22 lb) 110/62  (units   

 (unknown)



                    date)                         N         unknown)  

 

           (unknown)  (no       (unknown)  (unknown)  (+23 lb) 116/62  (units   

 (unknown)



                    date)                         N         unknown)  

 

           (unknown)  (no       (unknown)  (unknown)  (+24 lb) 128/62  (units   

 (unknown)



                    date)                         N         unknown)  

 

           (unknown)  (no       (unknown)  (unknown)  (+7 lb) 128/66 N  (units  

  (unknown)



                    date)                                   unknown)  

 

           (unknown)  (no       (unknown)  (unknown)  (+8 lb) 122/68 N  (units  

  (unknown)



                    date)                                   unknown)  

 

           (unknown)  (no       (unknown)  (unknown)  (1) Gestational  (units   

 (unknown)



                    date)                         diabetes: unknown)  

 

           (unknown)  (no       (unknown)  (unknown)  (2) H/O   (units    (unkno

wn)



                    date)                         infertility: unknown)  

 

           (unknown)  (no       (unknown)  (unknown)  (3) 33 weeks  (units    (u

nknown)



                    date)                         gestation of unknown)  



                                                  pregnancy:           

 

           (unknown)  (no       (unknown)  (unknown)  **Genetic  (units    (unkn

own)



                    date)                         Screening/Teratol unknown)  



                                                  ogy Counseling -           



                                                  Includes patient,           



                                                  baby's father, or           

 

           (unknown)  (no       (unknown)  (unknown)  -?-?-?-?-?-?-?-?  (units  

  (unknown)



                    date)                         -?-?-?-?  unknown)  

 

           (unknown)  (no       (unknown)  (unknown)  23  (units    (unkno

wn)



                    date)                                   unknown)  

 

           (unknown)  (no       (unknown)  (unknown)  23  (units    (unkno

wn)



                    date)                                   unknown)  

 

           (unknown)  (no       (unknown)  (unknown)  23  (units    (unkno

wn)



                    date)                                   unknown)  

 

           (unknown)  (no       (unknown)  (unknown)  23  (units    (unkno

wn)



                    date)                                   unknown)  

 

           (unknown)  (no       (unknown)  (unknown)  23]  (units    (unkn

own)



                    date)                                   unknown)  

 

           (unknown)  (no       (unknown)  (unknown)  23 0854  (units    (

unknown)



                    date)                                   unknown)  

 

           (unknown)  (no       (unknown)  (unknown)  04/15/23  (units    (unkno

wn)



                    date)                         Ultrasound #1 34w unknown)  



                                                  3d                  

 

           (unknown)  (no       (unknown)  (unknown)  [id number]   (units    (unkno

wn)



                    date)                                   unknown)  

 

           (unknown)  (no       (unknown)  (unknown)  22  (units    (unkno

wn)



                    date)                                   unknown)  

 

           (unknown)  (no       (unknown)  (unknown)  10/11/22  (units    (unkno

wn)



                    date)                                   unknown)  

 

           (unknown)  (no       (unknown)  (unknown)  22  (units    (unkno

wn)



                    date)                                   unknown)  

 

           (unknown)  (no       (unknown)  (unknown)  22  (units    (unkno

wn)



                    date)                                   unknown)  

 

           (unknown)  (no       (unknown)  (unknown)  12w 6d 163 lb  (units    (

unknown)



                    date)                                   unknown)  

 

           (unknown)  (no       (unknown)  (unknown)  15:40     (units    (unkno

wn)



                    date)                                   unknown)  

 

           (unknown)  (no       (unknown)  (unknown)  16w 6d 167 lb  (units    (

unknown)



                    date)                                   unknown)  

 

           (unknown)  (no       (unknown)  (unknown)  20w 5d 168 lb  (units    (

unknown)



                    date)                                   unknown)  

 

           (unknown)  (no       (unknown)  (unknown)  24w 6d 173 lb  (units    (

unknown)



                    date)                                   unknown)  

 

           (unknown)  (no       (unknown)  (unknown)  28w 6d 177 lb  (units    (

unknown)



                    date)                                   unknown)  

 

           (unknown)  (no       (unknown)  (unknown)  3 oz. 57%ile.  (units    (

unknown)



                    date)                         TIFFANIE 15.87 cm. unknown)  



                                                  Grade 0 placenta.           



                                                  Plan: Increase           



                                                  metformin to           

 

           (unknown)  (no       (unknown)  (unknown)  3 wks     (units    (unkno

wn)



                    date)                                   unknown)  

 

           (unknown)  (no       (unknown)  (unknown)  31w 6d 179 lb  (units    (

unknown)



                    date)                                   unknown)  

 

           (unknown)  (no       (unknown)  (unknown)  33w 5d 178 lb  (units    (

unknown)



                    date)                                   unknown)  

 

           (unknown)  (no       (unknown)  (unknown)  4 wk      (units    (unkno

wn)



                    date)                                   unknown)  

 

           (unknown)  (no       (unknown)  (unknown)  750 mg twice a  (units    

(unknown)



                    date)                         day. She will unknown)  



                                                  follow-up in 1           



                                                  week for a           



                                                  nonstress test.           



                                                  She                 

 

           (unknown)  (no       (unknown)  (unknown)  8w 6d 162 lb  (units    (u

nknown)



                    date)                                   unknown)  

 

           (unknown)  (no       (unknown)  (unknown)  Abnormal lab  (units    (u

nknown)



                    date)                         values 1st unknown)  



                                                  trimester:           



                                                  discussed           

 

           (unknown)  (no       (unknown)  (unknown)  Abnormal lab  (units    (u

nknown)



                    date)                         values 2nd unknown)  



                                                  trimester:           



                                                  discussed           

 

           (unknown)  (no       (unknown)  (unknown)  Add'l Plan  (units    (unk

nown)



                    date)                         Details   unknown)  

 

           (unknown)  (no       (unknown)  (unknown)  Age/Sex: 28 / F  (units   

 (unknown)



                    date)                         Date of Service: unknown)  

 

           (unknown)  (no       (unknown)  (unknown)  Allergies  (units    (unkn

own)



                    date)                         (-)   unknown)  

 

           (unknown)  (no       (unknown)  (unknown)  Allergies  (units    (unkn

own)



                    date)                                   unknown)  

 

           (unknown)  (no       (unknown)  (unknown)  [place]  (units    (

unknown)



                    date)                         [address]     unknown)  

 

           (unknown)  (no       (unknown)  (unknown)  Anesthesia  (units    (unk

nown)



                    date)                                   unknown)  

 

           (unknown)  (no       (unknown)  (unknown)  Aneuploidy  (units    (unk

nown)



                    date)                         Screening unknown)  



                                                  Offered: Accepted           



                                                  (wants CFDNA)           

 

           (unknown)  (no       (unknown)  (unknown)  Anticipated  (units    (un

known)



                    date)                         course of unknown)  



                                                  prenatal care:           



                                                  discussed           

 

           (unknown)  (no       (unknown)  (unknown)  Anxiety (-2016)  (units   

 (unknown)



                    date)                                   unknown)  

 

           (unknown)  (no       (unknown)  (unknown)  Arrhythmia  (units    (unk

nown)



                    date)                                   unknown)  

 

           (unknown)  (no       (unknown)  (unknown)  Assessment and  (units    

(unknown)



                    date)                         Plan      unknown)  

 

           (unknown)  (no       (unknown)  (unknown)  Attending Dr:  (units    (

unknown)



                    date)                         Julieta Au unknown)  



                                                  MD                  

 

           (unknown)  (no       (unknown)  (unknown)  Libra presents  (units   

 (unknown)



                    date)                         today for routine unknown)  



                                                  OB f/u at 20w5d.           



                                                  She reports good           



                                                  fetal               

 

           (unknown)  (no       (unknown)  (unknown)  BMI 28.7  (units    (unkno

wn)



                    date)                                   unknown)  

 

           (unknown)  (no       (unknown)  (unknown)  /62  (units    (unkn

own)



                    date)                                   unknown)  

 

           (unknown)  (no       (unknown)  (unknown)  Birth     (units    (unkno

wn)



                    date)                         Plan/Preferences unknown)  

 

           (unknown)  (no       (unknown)  (unknown)  Birth Planning  (units    

(unknown)



                    date)                                   unknown)  

 

           (unknown)  (no       (unknown)  (unknown)  Blood Pressure  (units    

(unknown)



                    date)                         Location Rt unknown)  



                                                  brachial            

 

           (unknown)  (no       (unknown)  (unknown)  Blood     (units    (unkno

wn)



                    date)                         transfusions?: unknown)  



                                                  yes (Never had           



                                                  but would accept)           

 

           (unknown)  (no       (unknown)  (unknown)  Breastfeeding:  (units    

(unknown)



                    date)                         discussed unknown)  

 

           (unknown)  (no       (unknown)  (unknown)  Chicken pox  (units    (un

known)



                    date)                         ()   unknown)  

 

           (unknown)  (no       (unknown)  (unknown)  Childbirth  (units    (unk

nown)



                    date)                         Classes:  unknown)  



                                                  discussed           

 

           (unknown)  (no       (unknown)  (unknown)  Conceived  (units    (unkn

own)



                    date)                         naturally this unknown)  



                                                  pregnancy           

 

           (unknown)  (no       (unknown)  (unknown)  Confirmed  (units    (unkn

own)



                    date)                         23] unknown)  

 

           (unknown)  (no       (unknown)  (unknown)  Current Estimate  (units  

  (unknown)



                    date)                         23  unknown)  



                                                  Ultrasound #2 33w           



                                                  6d                  

 

           (unknown)  (no       (unknown)  (unknown)  Current   (units    (unkno

wn)



                    date)                         Pregnancy History unknown)  

 

           (unknown)  (no       (unknown)  (unknown)  DNA       (units    (unkno

wn)



                    date)                                   unknown)  

 

           (unknown)  (no       (unknown)  (unknown)  : 1995  (units   

 (unknown)



                    date)                         Acct:[id number] unknown)  

 

           (unknown)  (no       (unknown)  (unknown)  Date of positive  (units  

  (unknown)



                    date)                         home pregnancy unknown)  



                                                  test: 08/15/22           

 

           (unknown)  (no       (unknown)  (unknown)  Date      (units    (unkno

wn)



                    date)                                   unknown)  

 

           (unknown)  (no       (unknown)  (unknown) Denies Congenital  (units  

  (unknown)



                    date)                         Heart Defect, unknown)  



                                                  Denies Down           



                                                  Syndrome, Denies           



                                                  Muscular            



                                                  Dystrophy,           

 

           (unknown)  (no       (unknown)  (unknown)  Denies Maternal  (units   

 (unknown)



                    date)                         Metabolic unknown)  



                                                  Disorder (EG,TYPE           



                                                  1 Diabetes, PKU),           



                                                  Denies Patient or           

 

           (unknown)  (no       (unknown)  (unknown)  Denies Neural  (units    (

unknown)



                    date)                         Tube Defect unknown)  



                                                  (Meningomyelocele           



                                                  , Spina Bifida,           



                                                  or Anencephaly),           

 

           (unknown)  (no       (unknown)  (unknown)  Denies Sickle  (units    (

unknown)



                    date)                         Cell Disease or unknown)  



                                                  Trait (),           



                                                  Denies Hemophilia           



                                                  or other blood           

 

           (unknown)  (no       (unknown)  (unknown)  Denies [employer]  (units  

  (unknown)



                    date)                         (Ashkenazi unknown)  



                                                  [facility] [place],           



                                                  [place]),           



                                                  Denies [place]           

 

           (unknown)  (no       (unknown)  (unknown)  Depression  (units    (unk

nown)



                    date)                         (-2016)   unknown)  

 

           (unknown)  (no       (unknown)  (unknown)  Depression:  (units    (un

known)



                    date)                         discussed unknown)  

 

           (unknown)  (no       (unknown)  (unknown)  Dept at   (units    (unkno

wn)



                    date)                         [phone]. unknown)  

 

           (unknown)  (no       (unknown)  (unknown)  Diet and  (units    (unkno

wn)



                    date)                         Exercise  unknown)  

 

           (unknown)  (no       (unknown)  (unknown)  Disease   (units    (unkno

wn)



                    date)                         (Ashkenazi unknown)  



                                                  Advent), Denies           



                                                  Familial            



                                                  Dysautonomia           



                                                  ([place]kenazi           



                                                  Advent),            

 

           (unknown)  (no       (unknown)  (unknown)  Documented By:  (units    

(unknown)



                    date)                         Julieta Au unknown)  



                                                  MD 23 1539           

 

           (unknown)  (no       (unknown)  (unknown)  MEHNAZ Calculator  (units    

(unknown)



                    date)                                   unknown)  

 

           (unknown)  (no       (unknown)  (unknown)  EGA Weight BP  (units    (

unknown)



                    date)                         UGlucose  unknown)  

 

           (unknown)  (no       (unknown)  (unknown)  Eczema (-2000)  (units    

(unknown)



                    date)                                   unknown)  

 

           (unknown)  (no       (unknown)  (unknown)  Estimated  (units    (unkn

own)



                    date)                         Delivery Date unknown)  



                                                  Method Current           

 

           (unknown)  (no       (unknown)  (unknown)  Exercise and  (units    (u

nknown)



                    date)                         activity, unknown)  



                                                  work/environmenta           



                                                  l/hazards, Sexual           



                                                  activity, X-ray           

 

           (unknown)  (no       (unknown)  (unknown)  F/u in 4 wks.  (units    (

unknown)



                    date)                                   unknown)  

 

           (unknown)  (no       (unknown)  (unknown)  Family History  (units    

(unknown)



                    date)                         (Updated 09/10/22 unknown)  



                                                  @ 21:49 by Fatoumata Flores)             

 

           (unknown)  (no       (unknown)  (unknown)  Family/Other  (units    (u

nknown)



                    date)                         Multiple  unknown)  



                                                  sclerosis           

 

           (unknown)  (no       (unknown)  (unknown)  Father    (units    (unkno

wn)



                    date)                         Hypertension unknown)  

 

           (unknown)  (no       (unknown)  (unknown)  Father of Baby:  (units   

 (unknown)



                    date)                         same      unknown)  

 

           (unknown)  (no       (unknown)  (unknown)  [facility]  (units   

 (unknown)



                    date)                         Associates unknown)  

 

           (unknown)  (no       (unknown)  (unknown)  First Trimester  (units   

 (unknown)



                    date)                         Education unknown)  



                                                  Checklist           

 

           (unknown)  (no       (unknown)  (unknown)  Foot pain  (units    (unkn

own)



                    date)                         ()   unknown)  

 

           (unknown)  (no       (unknown)  (unknown)   (SAB  (units    (unkn

own)



                    date)                         x7),Fertility Tx, unknown)  



                                                  meds only,           



                                                  started on baby           



                                                  aspirin             



                                                  10/11/2022           

 

           (unknown)  (no       (unknown)  (unknown)  GDM, on   (units    (unkno

wn)



                    date)                         Metformin 500mg unknown)  



                                                  BID, increased to           



                                                  750mg BID 3/6/23           

 

           (unknown)  (no       (unknown)  (unknown)  Genetic   (units    (unkno

wn)



                    date)                         Screening + unknown)  



                                                  Counseling           

 

           (unknown)  (no       (unknown)  (unknown)  Genetic   (units    (unkno

wn)



                    date)                         Screening unknown)  

 

           (unknown)  (no       (unknown)  (unknown)  Gestational  (units    (un

known)



                    date)                         diabetes mellitus unknown)  



                                                  control: oral           



                                                  hypoglycemic-cont           



                                                  rolled              

 

           (unknown)  (no       (unknown)  (unknown)  Good fetal  (units    (unk

nown)



                    date)                         movement. No unknown)  



                                                  leakage of fluid           



                                                  or vaginal           



                                                  bleeding. No           



                                                  regular             

 

           (unknown)  (no       (unknown)  (unknown)  Grandfather  (units    (un

known)



                    date)                          Cancer unknown)  

 

           (unknown)  (no       (unknown)  (unknown)  Grandfather  (units    (un

known)



                    date)                          Stroke unknown)  

 

           (unknown)  (no       (unknown)  (unknown)  Grandmother  (units    (un

known)



                    date)                          History unknown)  



                                                  of heart disease           

 

           (unknown)  (no       (unknown)  (unknown)  Grandmother  (units    (un

known)



                    date)                          Multiple unknown)  



                                                  sclerosis           

 

           (unknown)  (no       (unknown)  (unknown)   8  (units    (unkn

own)



                    date)                         Multiple births 0 unknown)  

 

           (unknown)  (no       (unknown)  (unknown)  HIV risk  (units    (unkno

wn)



                    date)                         evaluation: low unknown)  



                                                  risk                

 

           (unknown)  (no       (unknown)  (unknown)  Health Center  (units    (

unknown)



                    date)                         Education unknown)  

 

           (unknown)  (no       (unknown)  (unknown)  Health center  (units    (

unknown)



                    date)                         information: unknown)  



                                                  nature of           



                                                  practice            



                                                  discussed,           



                                                  prenatal            



                                                  personnel           

 

           (unknown)  (no       (unknown)  (unknown)  Height 5 ft 6 in  (units  

  (unknown)



                    date)                                   unknown)  

 

           (unknown)  (no       (unknown)  (unknown)  Hepatitis C risk  (units  

  (unknown)



                    date)                         evaluation: low unknown)  



                                                  risk                

 

           (unknown)  (no       (unknown)  (unknown)  History of  (units    (unk

nown)



                    date)                         Hepatitis B: No unknown)  

 

           (unknown)  (no       (unknown)  (unknown)  History of  (units    (unk

nown)



                    date)                         Hepatitis C: No unknown)  

 

           (unknown)  (no       (unknown)  (unknown)  History of  (units    (unk

nown)



                    date)                         recurrent unknown)  



                                                  miscarriages           

 

           (unknown)  (no       (unknown)  (unknown)  Hospital:   (units    (u

nknown)



                    date)                                   unknown)  

 

           (unknown)  (no       (unknown)  (unknown)  [facility]  (units    (u

nknown)



                    date)                         Chorea, Denies unknown)  



                                                  Other inherited           



                                                  genetic or           



                                                  chromosomal           



                                                  disorder,           

 

           (unknown)  (no       (unknown)  (unknown)  , [place]  (units    (

unknown)



                    date)                         [place]  unknown)  

 

           (unknown)  (no       (unknown)  (unknown)  Hx #   (units    (u

nknown)



                    date)                         Pregnancies 0 unknown)  



                                                  Elective            



                                                  abortions 0           

 

           (unknown)  (no       (unknown)  (unknown)  Hx # Term  (units    (unkn

own)



                    date)                         Pregnancies 0 unknown)  



                                                  Ectopic             



                                                  pregnancies 0           

 

           (unknown)  (no       (unknown)  (unknown)  Infant will be  (units    

(unknown)



                    date)                         adopted?: no unknown)  

 

           (unknown)  (no       (unknown)  (unknown)  Infection  (units    (unkn

own)



                    date)                         History   unknown)  

 

           (unknown)  (no       (unknown)  (unknown)  Infectious  (units    (unk

nown)



                    date)                         Disease Education unknown)  

 

           (unknown)  (no       (unknown)  (unknown)  Infectious  (units    (unk

nown)



                    date)                         disease exposure: unknown)  



                                                  chicken pox           



                                                  immunity            



                                                  discussed,           



                                                  hepatitis risk           

 

           (unknown)  (no       (unknown)  (unknown)  Infertility  (units    (un

known)



                    date)                         (-)   unknown)  

 

           (unknown)  (no       (unknown)  (unknown)  Initial Weight:  (units   

 (unknown)



                    date)                         155 lb    unknown)  

 

           (unknown)  (no       (unknown)  (unknown)  Initials  (units    (unkno

wn)



                    date)                                   unknown)  

 

           (unknown)  (no       (unknown)  (unknown)  Intake Clinical  (units   

 (unknown)



                    date)                         Staff     unknown)  

 

           (unknown)  (no       (unknown)  (unknown)  Intake Note:  (units    (u

nknown)



                    date)                                   unknown)  

 

           (unknown)  (no       (unknown)  (unknown)  Intake performed  (units  

  (unknown)



                    date)                         by:       unknown)  



                                                  Edelmira Montero           

 

           (unknown)  (no       (unknown)  (unknown)  Intake    (units    (unkno

wn)



                    date)                                   unknown)  

 

           (unknown)  (no       (unknown)  (unknown)  Irregular  (units    (unkn

own)



                    date)                         menstrual cycle unknown)  



                                                  (-)             

 

           (unknown)  (no       (unknown)  (unknown)  LM        (units    (unkno

wn)



                    date)                                   unknown)  

 

           (unknown)  (no       (unknown)  (unknown)  Lipoma    (units    (unkno

wn)



                    date)                                   unknown)  

 

           (unknown)  (no       (unknown)  (unknown)  Live with  (units    (unkn

own)



                    date)                         someone with TB unknown)  



                                                  or exposed to TB:           



                                                  No                  

 

           (unknown)  (no       (unknown)  (unknown)  Loc: [facility]  (units    (unkno

wn)



                    date)                                   unknown)  

 

           (unknown)  (no       (unknown)  (unknown)  Marital status:  (units   

 (unknown)



                    date)                            unknown)  

 

           (unknown)  (no       (unknown)  (unknown)  Medical History  (units   

 (unknown)



                    date)                         (Updated 23 unknown)  



                                                  @ 08:54 by           



                                                  Julieta Au MD)                 

 

           (unknown)  (no       (unknown)  (unknown)  Medications  (units    (un

known)



                    date)                                   unknown)  

 

           (unknown)  (no       (unknown)  (unknown)  Medications:  (units    (u

nknown)



                    date)                                   unknown)  

 

           (unknown)  (no       (unknown)  (unknown)  Mother Gastric  (units    

(unknown)



                    date)                         cancer    unknown)  

 

           (unknown)  (no       (unknown)  (unknown)  N No 142 13 N/A  (units   

 (unknown)



                    date)                         4wk       unknown)  

 

           (unknown)  (no       (unknown)  (unknown)  N No no 143 17  (units    

(unknown)



                    date)                         N/A absent AFP 4 unknown)  



                                                  wks                 

 

           (unknown)  (no       (unknown)  (unknown)  N No no 178 9  (units    (

unknown)



                    date)                         N/A absent unknown)  



                                                  long/closed AGA 9           

 

           (unknown)  (no       (unknown)  (unknown)  N Yes no 135 34  (units   

 (unknown)



                    date)                         Vertex absent 2 unknown)  



                                                  wks                 

 

           (unknown)  (no       (unknown)  (unknown)  N Yes no 141 24  (units   

 (unknown)



                    date)                         N/A absent 4 wks unknown)  

 

           (unknown)  (no       (unknown)  (unknown)  N Yes no 142 20  (units   

 (unknown)



                    date)                         N/A absent AFP unknown)  



                                                  negative            

 

           (unknown)  (no       (unknown)  (unknown)  N Yes no 144 29  (units   

 (unknown)



                    date)                         Vertex absent AGA unknown)  



                                                  29w5d               

 

           (unknown)  (no       (unknown)  (unknown)  N Yes no 150 32  (units   

 (unknown)



                    date)                         Vertex absent 2 unknown)  



                                                  wks                 

 

           (unknown)  (no       (unknown)  (unknown)  NF        (units    (unkno

wn)



                    date)                                   unknown)  

 

           (unknown)  (no       (unknown)  (unknown)  New       (units    (unkno

wn)



                    date)                                   unknown)  

 

           (unknown)  (no       (unknown)  (unknown)  Notes     (units    (unkno

wn)



                    date)                                   unknown)  

 

           (unknown)  (no       (unknown)  (unknown)  Number of Living  (units  

  (unknown)



                    date)                         Children 0 unknown)  

 

           (unknown)  (no       (unknown)  (unknown)  Number of  (units    (unkn

own)



                    date)                         fetuses:: Single unknown)  

 

           (unknown)  (no       (unknown)  (unknown)  Nutrition and  (units    (

unknown)



                    date)                         weight gain unknown)  



                                                  counseling:           



                                                  special diet:           



                                                  discussed           

 

           (unknown)  (no       (unknown)  (unknown)  OB Office Visit  (units   

 (unknown)



                    date)                                   unknown)  

 

           (unknown)  (no       (unknown)  (unknown)  OB Visit Log  (units    (u

nknown)



                    date)                                   unknown)  

 

           (unknown)  (no       (unknown)  (unknown)  OB check  (units    (unkno

wn)



                    date)                                   unknown)  

 

           (unknown)  (no       (unknown)  (unknown)  On U/S: AGA  (units    (un

known)



                    date)                         29w5d. 3#4oz unknown)  



                                                  66%ile. Nl AFV.           



                                                  Plan: Metformin           



                                                  500mg BID. F/U 3           

 

           (unknown)  (no       (unknown)  (unknown)  On birth control  (units  

  (unknown)



                    date)                         at conception?: unknown)  



                                                  No                  

 

           (unknown)  (no       (unknown)  (unknown)  Other Estimates  (units   

 (unknown)



                    date)                         23 LMP unknown)  



                                                  (Certain) 36w 0d           

 

           (unknown)  (no       (unknown)  (unknown)  Ovarian cyst  (units    (u

nknown)



                    date)                         (-)   unknown)  

 

           (unknown)  (no       (unknown)  (unknown)  PCOS (polycystic  (units  

  (unknown)



                    date)                         ovarian syndrome) unknown)  

 

           (unknown)  (no       (unknown)  (unknown)  PFSH      (units    (unkno

wn)



                    date)                                   unknown)  

 

           (unknown)  (no       (unknown)  (unknown)  Pap performed?:  (units   

 (unknown)



                    date)                         No        unknown)  

 

           (unknown)  (no       (unknown)  (unknown)  Para 0    (units    (unkno

wn)



                    date)                         Spontaneous unknown)  



                                                  abortions 7           

 

           (unknown)  (no       (unknown)  (unknown)  Partner history  (units   

 (unknown)



                    date)                         of STD: denies hx unknown)  

 

           (unknown)  (no       (unknown)  (unknown)  Partner history  (units   

 (unknown)



                    date)                         of genital unknown)  



                                                  herpes: No           

 

           (unknown)  (no       (unknown)  (unknown)  Partner: [place]  (units    (

unknown)



                    date)                         [place]  unknown)  

 

           (unknown)  (no       (unknown)  (unknown)  Patient comes in  (units  

  (unknown)



                    date)                         for follow-up OB unknown)  



                                                  visit. She had           



                                                  some pelvic pain           



                                                  that                

 

           (unknown)  (no       (unknown)  (unknown)  Patient presents  (units  

  (unknown)



                    date)                         for a new OB unknown)  



                                                  visit at 8 weeks           



                                                  gestation. She is           

 

           (unknown)  (no       (unknown)  (unknown)  Patient presents  (units  

  (unknown)



                    date)                         for a routine unknown)  



                                                  prenatal visit at           



                                                  33+ 5 weeks'           



                                                  gestation.           

 

           (unknown)  (no       (unknown)  (unknown)  Patient presents  (units  

  (unknown)



                    date)                         for a routine unknown)  



                                                  prenatal visit,           



                                                  accompanied by           



                                                  her .           

 

           (unknown)  (no       (unknown)  (unknown)  Patient's age 33  (units  

  (unknown)



                    date)                         years or older as unknown)  



                                                  of estimated date           



                                                  of delivery: No           

 

           (unknown)  (no       (unknown)  (unknown)  Patient:  (units    (unkno

wn)



                    date)                         Libra Prieto unknown)  



                                                  MR#: [id number]            

 

           (unknown)  (no       (unknown)  (unknown)  Pediatrician:  (units    (

unknown)



                    date)                         DOD [place] vs unknown)  



                                                  [facility]           



                                                  [facility]           



                                                  [place]             

 

           (unknown)  (no       (unknown)  (unknown)  Personal history  (units  

  (unknown)



                    date)                         of STD: denies hx unknown)  

 

           (unknown)  (no       (unknown)  (unknown)  Personal history  (units  

  (unknown)



                    date)                         of genital unknown)  



                                                  herpes: No           

 

           (unknown)  (no       (unknown)  (unknown)  Plantar   (units    (unkno

wn)



                    date)                         fasciitis unknown)  

 

           (unknown)  (no       (unknown)  (unknown)  Position Sitting  (units  

  (unknown)



                    date)                                   unknown)  

 

           (unknown)  (no       (unknown)  (unknown)  Postpartum  (units    (unk

nown)



                    date)                         family    unknown)  



                                                  planning/Tubal           



                                                  sterilization:           



                                                  further             



                                                  discussion needed           

 

           (unknown)  (no       (unknown)  (unknown)  Pregnancy  (units    (unkn

own)



                    date)                         History   unknown)  

 

           (unknown)  (no       (unknown)  (unknown)  Pregnancy type::  (units  

  (unknown)



                    date)                         Other Normal unknown)  



                                                  Pregnancy           

 

           (unknown)  (no       (unknown)  (unknown)  Prenatal  (units    (unkno

wn)



                    date)                         Education unknown)  

 

           (unknown)  (no       (unknown)  (unknown)  Prenatal Initial  (units  

  (unknown)



                    date)                         Assessment unknown)  

 

           (unknown)  (no       (unknown)  (unknown)  Prenatal  (units    (unkno

wn)



                    date)                         Specific  unknown)  



                                                  Issues/Plans           

 

           (unknown)  (no       (unknown)  (unknown)  Prenatal  (units    (unkno

wn)



                    date)                         Testing:  unknown)  



                                                  discussed           

 

           (unknown)  (no       (unknown)  (unknown)  Prenatal Visit  (units    

(unknown)



                    date)                                   unknown)  

 

           (unknown)  (no       (unknown)  (unknown)  Prenatal  (units    (unkno

wn)



                    date)                         education packet: unknown)  



                                                  Child birth           



                                                  education/plan,           



                                                  Pregnancy           



                                                  symptoms,           

 

           (unknown)  (no       (unknown)  (unknown)  Primary Care  (units    (u

nknown)



                    date)                         Provider: BAS[facility] unknown)  



                                                  [place]              

 

           (unknown)  (no       (unknown)  (unknown)  Primary Ob  (units    (unk

nown)



                    date)                         Provider: unknown)  



                                                  Julieta Au           

 

           (unknown)  (no       (unknown)  (unknown)  Prior     (units    (unkno

wn)



                    date)                         GBS-Infected unknown)  



                                                  child: No           

 

           (unknown)  (no       (unknown)  (unknown)  Providers  (units    (unkn

own)



                    date)                                   unknown)  

 

           (unknown)  (no       (unknown)  (unknown)  Pt presents for  (units   

 (unknown)



                    date)                         a PNV at 16+6 unknown)  



                                                  wks. No FM. No           



                                                  LOF/VB. Rec'd flu           



                                                  shot                

 

           (unknown)  (no       (unknown)  (unknown)  Pt presents for  (units   

 (unknown)



                    date)                         a routine PNV at unknown)  



                                                  28 +6 wks           



                                                  gestation.           



                                                  Abnormal 3 hr           



                                                  GTT.                

 

           (unknown)  (no       (unknown)  (unknown)  Qualifiers:  (units    (un

known)



                    date)                                   unknown)  

 

           (unknown)  (no       (unknown)  (unknown)  Rash or viral  (units    (

unknown)



                    date)                         illness since unknown)  



                                                  last menstrual           



                                                  period: No           

 

           (unknown)  (no       (unknown)  (unknown)  Rash      (units    (unkno

wn)



                    date)                                   unknown)  

 

           (unknown)  (no       (unknown)  (unknown)  Reason For Visit  (units  

  (unknown)



                    date)                                   unknown)  

 

           (unknown)  (no       (unknown)  (unknown)  Recent travel  (units    (

unknown)



                    date)                         outside of unknown)  



                                                  country?: No           

 

           (unknown)  (no       (unknown)  (unknown)  Recurrent  (units    (unkn

own)



                    date)                         pregnancy loss or unknown)  



                                                  a stillbirth: Yes           

 

           (unknown)  (no       (unknown)  (unknown)  Reports over the  (units  

  (unknown)



                    date)                         counter   unknown)  



                                                  medications           



                                                  (diclofenac),           



                                                  Denies alcohol,           



                                                  Denies              

 

           (unknown)  (no       (unknown)  (unknown)  Safety    (units    (unkno

wn)



                    date)                                   unknown)  

 

           (unknown)  (no       (unknown)  (unknown)  Second Trimester  (units  

  (unknown)



                    date)                         Education unknown)  



                                                  Checklist           

 

           (unknown)  (no       (unknown)  (unknown)  Selecting a  (units    (un

known)



                    date)                          care unknown)  



                                                  provider: further           



                                                  discussion needed           

 

           (unknown)  (no       (unknown)  (unknown)  She is at 22  (units    (u

nknown)



                    date)                         weeks 6 days. She unknown)  



                                                  has felt good           



                                                  fetal movement.           



                                                  She says it is           

 

           (unknown)  (no       (unknown)  (unknown)  Shingles  (units    (unkno

wn)



                    date)                                   unknown)  

 

           (unknown)  (no       (unknown)  (unknown)  Signed By:  (units    (unk

nown)



                    date)                         <Electronically unknown)  



                                                  signed by           



                                                  Julieta Au MD>                 

 

           (unknown)  (no       (unknown)  (unknown)  Signed    (units    (unkno

wn)



                    date)                                   unknown)  

 

           (unknown)  (no       (unknown)  (unknown)  Signs and  (units    (unkn

own)



                    date)                         symptoms of unknown)  



                                                   labor:           



                                                  discussed           

 

           (unknown)  (no       (unknown)  (unknown)  Smoking Status:  (units   

 (unknown)



                    date)                         Never smoker unknown)  

 

           (unknown)  (no       (unknown)  (unknown)  Social History  (units    

(unknown)



                    date)                                   unknown)  

 

           (unknown)  (no       (unknown)  (unknown)  Status: Acute  (units    (

unknown)



                    date)                                   unknown)  

 

           (unknown)  (no       (unknown)  (unknown)  Support   (units    (unkno

wn)



                    date)                         Person(s):: [place] unknown)  

 

           (unknown)  (no       (unknown)  (unknown)  Surgical History  (units  

  (unknown)



                    date)                         (Updated 09/10/22 unknown)  



                                                  @ 21:46 by Fatoumata Flores)             

 

           (unknown)  (no       (unknown)  (unknown)  Surrogate  (units    (unkn

own)



                    date)                         pregnancy?: no unknown)  

 

           (unknown)  (no       (unknown)  (unknown)  Symptoms since  (units    

(unknown)



                    date)                         LMP: Reports unknown)  



                                                  amenorrhea,           



                                                  nausea, fatigue,           



                                                  breast              



                                                  tenderness,           

 

           (unknown)  (no       (unknown)  (unknown)  Take 1 1/2 tabs  (units   

 (unknown)



                    date)                         twice a day 750 unknown)  



                                                  mg (1.5 x 500 mg)           



                                                  PO BID 90 tabs           



                                                  1RF                 

 

           (unknown)  (no       (unknown)  (unknown)  Teratogen  (units    (unkn

own)



                    date)                         Exposures since unknown)  



                                                  LMP/Conception:           



                                                  Denies              



                                                  prescription           



                                                  medications,           

 

           (unknown)  (no       (unknown)  (unknown)  Testing   (units    (unkno

wn)



                    date)                         Education unknown)  

 

           (unknown)  (no       (unknown)  (unknown)  Testing   (units    (unkno

wn)



                    date)                         education unknown)  



                                                  completed: group           



                                                  B strep, Spina           



                                                  bifida testing           



                                                  and Cell Free           

 

           (unknown)  (no       (unknown)  (unknown)  This note may  (units    (

unknown)



                    date)                         have been all or unknown)  



                                                  partially           



                                                  generated using           



                                                  voice recognition           

 

           (unknown)  (no       (unknown)  (unknown)  Tobacco +  (units    (unkn

own)



                    date)                         Substance Use unknown)  

 

           (unknown)  (no       (unknown)  (unknown)  Tobacco Status  (units    

(unknown)



                    date)                                   unknown)  

 

           (unknown)  (no       (unknown)  (unknown)  Trimester: third  (units  

  (unknown)



                    date)                         trimester unknown)  



                                                  Qualified           



                                                  Code(s): O24.415           



                                                  - Gestational           



                                                  diabetes            

 

           (unknown)  (no       (unknown)  (unknown)  Trimester:: 3rd  (units   

 (unknown)



                    date)                         Trimester unknown)  



                                                  (28wks-Del)           

 

           (unknown)  (no       (unknown)  (unknown)  Tumor (-10/2012)  (units  

  (unknown)



                    date)                                   unknown)  

 

           (unknown)  (no       (unknown)  (unknown)  Type(s) of  (units    (unk

nown)



                    date)                         exercise: unknown)  



                                                  bicycling           



                                                  (stationary           



                                                  bike), regular           



                                                  exercise, weight           

 

           (unknown)  (no       (unknown)  (unknown) UProtein Movement  (units  

  (unknown)



                    date)                         PreLabor FHR Fndl unknown)  



                                                  Ht Pres Edema           



                                                  Cerv Exam           



                                                  US/Comment Next           



                                                  Appt                

 

           (unknown)  (no       (unknown)  (unknown)  Ultrasound  (units    (unk

nown)



                    date)                         performed?: No unknown)  

 

           (unknown)  (no       (unknown)  (unknown)  Varicella/chicke  (units  

  (unknown)



                    date)                         n pox status: unknown)  



                                                  previous disease           

 

           (unknown)  (no       (unknown)  (unknown)  Visit Date:  (units    (un

known)



                    date)                         23 Last unknown)  



                                                  Updated by:           



                                                  Julieta Au MD                  

 

           (unknown)  (no       (unknown)  (unknown)  Visit Date:  (units    (un

known)



                    date)                         23 Last unknown)  



                                                  Updated by:           



                                                  Julieta Au MD                  

 

           (unknown)  (no       (unknown)  (unknown)  Visit Date:  (units    (un

known)



                    date)                         23 Last unknown)  



                                                  Updated by:           



                                                  Julieta Au MD                  

 

           (unknown)  (no       (unknown)  (unknown)  Visit Date:  (units    (un

known)



                    date)                         22 Last unknown)  



                                                  Updated by:           



                                                  Julieta Au MD                  

 

           (unknown)  (no       (unknown)  (unknown)  Visit Date:  (units    (un

known)



                    date)                         10/11/22 Last unknown)  



                                                  Updated by:           



                                                  Fanny Baker MD                  

 

           (unknown)  (no       (unknown)  (unknown)  Visit Date:  (units    (un

known)



                    date)                         22 Last unknown)  



                                                  Updated by:           



                                                  Julieta Au MD                  

 

           (unknown)  (no       (unknown)  (unknown)  Visit Date:  (units    (un

known)



                    date)                         22 Last unknown)  



                                                  Updated by: Berta Salinas P.A-C           

 

           (unknown)  (no       (unknown)  (unknown)  Visit Reasons:  (units    

(unknown)



                    date)                         OB        unknown)  

 

           (unknown)  (no       (unknown)  (unknown)  Vitals    (units    (unkno

wn)



                    date)                                   unknown)  

 

           (unknown)  (no       (unknown)  (unknown)  Vitamins and  (units    (u

nknown)



                    date)                         iron, Diet and unknown)  



                                                  weight gain, Fish           



                                                  and mercury           



                                                  intake, Caffeine           



                                                  use,                

 

           (unknown)  (no       (unknown)  (unknown)  WG        (units    (unkno

wn)



                    date)                                   unknown)  

 

           (unknown)  (no       (unknown)  (unknown)  Weeks     (units    (unkno

wn)



                    date)                         gestation:: 33 unknown)  

 

           (unknown)  (no       (unknown)  (unknown)  Weight 178 lb  (units    (

unknown)



                    date)                                   unknown)  

 

           (unknown)  (no       (unknown)  (unknown)  [place] teeth  (units    (u

nknown)



                    date)                         extracted unknown)  

 

           (unknown)  (no       (unknown)  (unknown)  Zika virus  (units    (unk

nown)



                    date)                         exposure: No unknown)  

 

           (unknown)  (no       (unknown)  (unknown)  accompanied by  (units    

(unknown)



                    date)                         her . She unknown)  



                                                  went through           



                                                  fertility           



                                                  treatments with           

 

           (unknown)  (no       (unknown)  (unknown)  alcohol intake:  (units   

 (unknown)



                    date)                         never     unknown)  

 

           (unknown)  (no       (unknown)  (unknown)  anterior  (units    (unkno

wn)



                    date)                         placenta. Routine unknown)  



                                                  precautions           



                                                  reviewed with the           



                                                  patient. Due to           



                                                  1st                 

 

           (unknown)  (no       (unknown)  (unknown)  anyone in either  (units  

  (unknown)



                    date)                         family with: unknown)  

 

           (unknown)  (no       (unknown)  (unknown)  baby's father  (units    (

unknown)



                    date)                         had a child with unknown)  



                                                  birth defects not           



                                                  listed above and           



                                                  Denies Other           

 

           (unknown)  (no       (unknown)  (unknown)  back pain.  (units    (unk

nown)



                    date)                         Advised   unknown)  



                                                  stretching, belly           



                                                  band, and PT if           



                                                  not improving.           



                                                  AFP was             

 

           (unknown)  (no       (unknown)  (unknown)  blood sugar  (units    (un

known)



                    date)                         diagnostic (Blood unknown)  



                                                  Glucose Test           



                                                  strips) #100 ea           



                                                  23 [Rx           

 

           (unknown)  (no       (unknown)  (unknown)  blood-glucose  (units    (

unknown)



                    date)                         meter #1 ea unknown)  



                                                  23 [Rx           



                                                  Confirmed           



                                                  23]           

 

           (unknown)  (no       (unknown)  (unknown)  by ultrasound is  (units  

  (unknown)



                    date)                         normal with good unknown)  



                                                  fetal movement,           



                                                  normal appearing           



                                                  fluid,              

 

           (unknown)  (no       (unknown)  (unknown)  caffeine: Yes  (units    (

unknown)



                    date)                         (1-2 cups unknown)  



                                                  tea/day)            

 

           (unknown)  (no       (unknown)  (unknown)  carbon monox  (units    (u

nknown)



                    date)                         detector in home: unknown)  



                                                  Yes                 

 

           (unknown)  (no       (unknown)  (unknown)  cfDNA normal  (units    (u

nknown)



                    date)                         female, AFP unknown)  



                                                  negative            

 

           (unknown)  (no       (unknown)  (unknown)  consistent with  (units   

 (unknown)



                    date)                         9 weeks 0 days. unknown)  



                                                  Yolk sac visible.           



                                                  Fetal heart rate           



                                                  178 beats           

 

           (unknown)  (no       (unknown)  (unknown)  contractions.  (units    (

unknown)



                    date)                         Her fasting unknown)  



                                                  glucose has been           



                                                  in the            



                                                  range. She is           

 

           (unknown)  (no       (unknown)  (unknown)  current   (units    (unkno

wn)



                    date)                         occupational unknown)  



                                                  exposures/hazards           



                                                  : No                

 

           (unknown)  (no       (unknown)  (unknown)  currently on  (units    (u

nknown)



                    date)                         metformin 500 mg unknown)  



                                                  twice a day. She           



                                                  had a nonstress           



                                                  test today which           

 

           (unknown)  (no       (unknown)  (unknown)  daily servings  (units    

(unknown)



                    date)                         fruits/ve-4 unknown)  

 

           (unknown)  (no       (unknown)  (unknown)  described, visit  (units  

  (unknown)



                    date)                         schedule  unknown)  



                                                  reviewed,           



                                                  ultrasounds           



                                                  policy reviewed,           



                                                  coverage 24           

 

           (unknown)  (no       (unknown)  (unknown)  developing a  (units    (u

nknown)



                    date)                         pattern. No unknown)  



                                                  leakage of fluid           



                                                  or vaginal           



                                                  bleeding. No           



                                                  contractions           

 

           (unknown)  (no       (unknown)  (unknown)  discussed,  (units    (unk

nown)



                    date)                         tuberculosis unknown)  



                                                  exposure            



                                                  discussed, CMV           



                                                  discussed,           



                                                  Toxoplasmosis           

 

           (unknown)  (no       (unknown)  (unknown)  disorders,  (units    (unk

nown)



                    date)                         Denies Cystic unknown)  



                                                  Fibrosis, Denies           



                                                  Mental              



                                                  Retardation/Autis           



                                                  m, Denies           

 

           (unknown)  (no       (unknown)  (unknown)  do you feel safe  (units  

  (unknown)



                    date)                         at home: Yes unknown)  

 

           (unknown)  (no       (unknown)  (unknown)  during the past  (units   

 (unknown)



                    date)                         year weight has: unknown)  



                                                  remained stable           

 

           (unknown)  (no       (unknown)  (unknown)  education level:  (units  

  (unknown)



                    date)                         college   unknown)  



                                                  (Associate's           



                                                  degree)             

 

           (unknown)  (no       (unknown)  (unknown)  exposure,  (units    (unkn

own)



                    date)                         Medication use, unknown)  



                                                  Sauna/hot tub           



                                                  use, Dental care,           



                                                  Travel and           



                                                  Influenza           

 

           (unknown)  (no       (unknown)  (unknown)  fire      (units    (unkno

wn)



                    date)                         extinguisher in unknown)  



                                                  home: Yes           

 

           (unknown)  (no       (unknown)  (unknown)  firearms in  (units    (un

known)



                    date)                         home: Yes unknown)  



                                                  firearms unloaded           



                                                  and locked: Yes           

 

           (unknown)  (no       (unknown)  (unknown)  frequency: 5-6  (units    

(unknown)



                    date)                         times per week unknown)  

 

           (unknown)  (no       (unknown)  (unknown)  gestational sac  (units   

 (unknown)



                    date)                         with a fetus with unknown)  



                                                  a crown-rump           



                                                  length measuring           



                                                  2.29 cm             

 

           (unknown)  (no       (unknown)  (unknown)  have occurred.  (units    

(unknown)



                    date)                         If there are any unknown)  



                                                  questions, please           



                                                  contact the           



                                                  Medical Records           

 

           (unknown)  (no       (unknown)  (unknown)  hours a day and  (units   

 (unknown)



                    date)                         participation of unknown)  



                                                  father in           



                                                  prenatal care and           



                                                  office visits           

 

           (unknown)  (no       (unknown)  (unknown)  household  (units    (unkn

own)



                    date)                         members: spouse unknown)  



                                                  and family           



                                                  (father and           



                                                  father-in-law)           

 

           (unknown)  (no       (unknown)  (unknown)  housing: house  (units    

(unknown)



                    date)                                   unknown)  

 

           (unknown)  (no       (unknown)  (unknown)  illicit drugs  (units    (

unknown)



                    date)                         and Denies other unknown)  

 

           (unknown)  (no       (unknown)  (unknown)  kag       (units    (unkno

wn)



                    date)                                   unknown)  

 

           (unknown)  (no       (unknown)  (unknown)  lancets #100 ea  (units   

 (unknown)



                    date)                         23 [Rx unknown)  



                                                  Confirmed           



                                                  23]           

 

           (unknown)  (no       (unknown)  (unknown)  last visit.  (units    (un

known)



                    date)                         Plan: AFP today. unknown)  



                                                  20 wk u/s           



                                                  reviewed. F/U 4           



                                                  wks. Warning           



                                                  signs               

 

           (unknown)  (no       (unknown)  (unknown)  lifting and  (units    (un

known)



                    date)                         other (hiking) unknown)  

 

           (unknown)  (no       (unknown)  (unknown)  lives     (units    (unkno

wn)



                    date)                         independently: unknown)  



                                                  Yes                 

 

           (unknown)  (no       (unknown)  (unknown)  marital status:  (units   

 (unknown)



                    date)                            unknown)  

 

           (unknown)  (no       (unknown)  (unknown)  may occur.  (units    (unk

nown)



                    date)                         Occasional unknown)  



                                                  wrong-word or           



                                                  'sound-alike'           



                                                  substitutions may           



                                                  have                

 

           (unknown)  (no       (unknown)  (unknown)  medication only.  (units  

  (unknown)



                    date)                         Had 7     unknown)  



                                                  miscarriages. No           



                                                  bleeding with           



                                                  this pregnancy.           



                                                  This 1              

 

           (unknown)  (no       (unknown)  (unknown)  mellitus in  (units    (un

known)



                    date)                         pregnancy, unknown)  



                                                  controlled by           



                                                  oral hypoglycemic           



                                                  drugs               

 

           (unknown)  (no       (unknown)  (unknown)  metformin 500 mg  (units  

  (unknown)



                    date)                         tablet 500 mg PO unknown)  



                                                  BID #60 tabs           



                                                  23 [Rx           



                                                  Confirmed           



                                                  23]           

 

           (unknown)  (no       (unknown)  (unknown)  metformin 500 mg  (units  

  (unknown)



                    date)                         tablet 750 mg PO unknown)  



                                                  BID #90 tabs           



                                                  23 [Rx           



                                                  Confirmed           



                                                  23]           

 

           (unknown)  (no       (unknown)  (unknown)  metformin  (units    (unkn

own)



                    date)                                   unknown)  

 

           (unknown)  (no       (unknown)  (unknown)  movement and  (units    (u

nknown)



                    date)                         denies VB, LOF, unknown)  



                                                  cramping and           



                                                  regular             



                                                  contractions. Pt           



                                                  reports low           

 

           (unknown)  (no       (unknown)  (unknown)  negative.  (units    (unkn

own)



                    date)                         Anatomy US unknown)  



                                                  scheduled for           



                                                  later today.           



                                                  Warning             



                                                  precautions           



                                                  reviewed.           

 

           (unknown)  (no       (unknown)  (unknown)  nickel Allergy  (units    

(unknown)



                    date)                         (Mild, Verified unknown)  



                                                  23 15:40)           

 

           (unknown)  (no       (unknown)  (unknown)  number of  (units    (unkn

own)



                    date)                         children: 0 unknown)  

 

           (unknown)  (no       (unknown)  (unknown)  occupational  (units    (u

nknown)



                    date)                         status: employed unknown)  



                                                  (active duty           



                                                  avionics            



                                                  ,           



                                                  [employer] job            

 

           (unknown)  (no       (unknown)  (unknown)  occurred due to  (units   

 (unknown)



                    date)                         the inherent unknown)  



                                                  limitations of           



                                                  voice recognition           



                                                  software. Please           

 

           (unknown)  (no       (unknown)  (unknown)  or cramping.  (units    (u

nknown)



                    date)                         Plan: 1 hour unknown)  



                                                  glucose ordered.           



                                                  Follow-up in 4           



                                                  weeks. Warning           

 

           (unknown)  (no       (unknown)  (unknown)  per minute.  (units    (un

known)



                    date)                         Normal ovaries unknown)  



                                                  bilaterally.           



                                                  Plan: Follow-up           



                                                  in 4 weeks.           



                                                  Warning             

 

           (unknown)  (no       (unknown)  (unknown)  pets and  (units    (unkno

wn)



                    date)                         animals: Yes (1 unknown)  



                                                  large dog,           



                                                  chickens)           

 

           (unknown)  (no       (unknown)  (unknown)  precautions,  (units    (u

nknown)



                    date)                         Listeriosis unknown)  



                                                  prevention and           



                                                  Rubella             



                                                  Immunization           

 

           (unknown)  (no       (unknown)  (unknown)  preeclampsia.  (units    (

unknown)



                    date)                                   unknown)  

 

           (unknown)  (no       (unknown)  (unknown)  pregnancy  (units    (unkn

own)



                    date)                         discussed unknown)  



                                                  starting baby           



                                                  aspirin per day           



                                                  to decrease her           



                                                  risk for            

 

           (unknown)  (no       (unknown)  (unknown)  prenat.vits,nan,  (units  

  (unknown)



                    date)                         min-iron-folic 1 unknown)  



                                                  tab PO DAILY           



                                                  22 [History           



                                                  Confirmed           

 

           (unknown)  (no       (unknown)  (unknown)  read the note  (units    (

unknown)



                    date)                         carefully and unknown)  



                                                  recognize, using           



                                                  context, where           



                                                  these               



                                                  substitutions           

 

           (unknown)  (no       (unknown)  (unknown)  reviewed.  (units    (unkn

own)



                    date)                                   unknown)  

 

           (unknown)  (no       (unknown)  (unknown)  seatbelt use:  (units    (

unknown)



                    date)                         always    unknown)  

 

           (unknown)  (no       (unknown)  (unknown)  second hand  (units    (un

known)



                    date)                         exposure: Yes unknown)  



                                                  (father-in-law           



                                                  smokes outside           



                                                  the house)           

 

           (unknown)  (no       (unknown)  (unknown)  signs for  (units    (unkn

own)



                    date)                          labor unknown)  



                                                  reviewed.           

 

           (unknown)  (no       (unknown)  (unknown)  signs reviewed.  (units   

 (unknown)



                    date)                         cfDNA ordered. unknown)  

 

           (unknown)  (no       (unknown)  (unknown)  signs reviewed.  (units   

 (unknown)



                    date)                                   unknown)  

 

           (unknown)  (no       (unknown)  (unknown)  software.  (units    (unkn

own)



                    date)                         Although every unknown)  



                                                  effort is made to           



                                                  edit content,           



                                                  transcription           



                                                  errors              

 

           (unknown)  (no       (unknown)  (unknown)  special madi  (units    (

unknown)



                    date)                         needs: No unknown)  

 

           (unknown)  (no       (unknown)  (unknown)  substance use  (units    (

unknown)



                    date)                         type: does not unknown)  



                                                  use                 

 

           (unknown)  (no       (unknown)  (unknown)  travel history:  (units   

 (unknown)



                    date)                         over 6 months ago unknown)  

 

           (unknown)  (no       (unknown)  (unknown)  urinary   (units    (unkno

wn)



                    date)                         frequency and unknown)  



                                                  irritability           

 

           (unknown)  (no       (unknown)  (unknown)  vaccine (Annual  (units   

 (unknown)



                    date)                         flu, Phizer Covid unknown)  



                                                  x2)                 

 

           (unknown)  (no       (unknown)  (unknown)  w0d 4 wks  (units    (unkn

own)



                    date)                                   unknown)  

 

           (unknown)  (no       (unknown)  (unknown)  was not using  (units    (

unknown)



                    date)                         any infertility unknown)  



                                                  medications.           



                                                  Ultrasound: An           



                                                  intrauterine           

 

           (unknown)  (no       (unknown)  (unknown)  was reactive. On  (units  

  (unknown)



                    date)                         ultrasound: unknown)  



                                                  Vertex              



                                                  presentation. AGA           



                                                  34 weeks 0 days.           



                                                  5 lb                

 

           (unknown)  (no       (unknown)  (unknown)  water heater  (units    (u

nknown)



                    date)                         temp set < 120 unknown)  



                                                  deg: Yes            

 

           (unknown)  (no       (unknown)  (unknown)  well-balanced  (units    (

unknown)



                    date)                         diet: daily or unknown)  



                                                  most days           

 

           (unknown)  (no       (unknown)  (unknown)  went away with  (units    

(unknown)



                    date)                         laying down. No unknown)  



                                                  vaginal bleeding.           



                                                  No fevers. Fetal           



                                                  heart tone           

 

           (unknown)  (no       (unknown)  (unknown)  while pregnant)  (units   

 (unknown)



                    date)                                   unknown)  

 

           (unknown)  (no       (unknown)  (unknown)  will follow-up  (units    

(unknown)



                    date)                         in 2 weeks with unknown)  



                                                  Dr. Baker for an           



                                                  TIFFANIE/nonstress           



                                                  test. Warning           

 

           (unknown)  (no       (unknown)  (unknown)  wks. Warning  (units    (u

nknown)



                    date)                         signs for PTL unknown)  



                                                  reviewed.           

 

           (unknown)  (no       (unknown)  (unknown)  working smoke  (units    (

unknown)



                    date)                         detector in home: unknown)  



                                                  Yes                 









                                         Result panel 26









           (unknown)  (no       (unknown)  (unknown)  (no value)  (units    (unk

nown)



                    date)                                   unknown)  

 

           (unknown)  (no       (unknown)  (unknown)  (1) 34 weeks  (units    (u

nknown)



                    date)                         gestation of unknown)  



                                                  pregnancy:           

 

           (unknown)  (no       (unknown)  (unknown)  (2) Gestational  (units   

 (unknown)



                    date)                         diabetes: unknown)  

 

           (unknown)  (no       (unknown)  (unknown)  (Metformin) and  (units   

 (unknown)



                    date)                         other (7 prior unknown)  



                                                  miscarriages)           

 

           (unknown)  (no       (unknown)  (unknown)  23 1359  (units    (

unknown)



                    date)                                   unknown)  

 

           (unknown)  (no       (unknown)  (unknown)  [id number]   (units    (unkno

wn)



                    date)                                   unknown)  

 

           (unknown)  (no       (unknown)  (unknown)  Age/Sex: 28 / F  (units   

 (unknown)



                    date)                                   unknown)  

 

           (unknown)  (no       (unknown)  (unknown)  Allergies  (units    (unkn

own)



                    date)                         ()   unknown)  

 

           (unknown)  (no       (unknown)  (unknown)  Anesthesia  (units    (unk

nown)



                    date)                                   unknown)  

 

           (unknown)  (no       (unknown)  (unknown)  Anxiety (-)  (units   

 (unknown)



                    date)                                   unknown)  

 

           (unknown)  (no       (unknown)  (unknown)  Arrhythmia  (units    (unk

nown)



                    date)                                   unknown)  

 

           (unknown)  (no       (unknown)  (unknown)  Baseline fetal  (units    

(unknown)



                    date)                         heart rate: 130 unknown)  

 

           (unknown)  (no       (unknown)  (unknown)  Blood pressure  (units    

(unknown)



                    date)                         115/65, pulse unknown)  



                                                  90, temperature           



                                                  36.5?               

 

           (unknown)  (no       (unknown)  (unknown)  Category of  (units    (un

known)



                    date)                         Tracing:  unknown)  



                                                  Reactive            

 

           (unknown)  (no       (unknown)  (unknown)  Chicken pox  (units    (un

known)



                    date)                         ()   unknown)  

 

           (unknown)  (no       (unknown)  (unknown)  : 1995  (units   

 (unknown)



                    date)                         Acct:[id number] unknown)  

 

           (unknown)  (no       (unknown)  (unknown)  Date of   (units    (unkno

wn)



                    date)                         Service:  unknown)  



                                                  23            

 

           (unknown)  (no       (unknown)  (unknown)  Date of   (units    (unkno

wn)



                    date)                         evaluation: unknown)  



                                                  23            

 

           (unknown)  (no       (unknown)  (unknown)  Depression  (units    (unk

nown)



                    date)                         ()   unknown)  

 

           (unknown)  (no       (unknown)  (unknown)  Diagnosis,  (units    (unk

nown)



                    date)                         Plan/Disposition unknown)  

 

           (unknown)  (no       (unknown)  (unknown)  Eczema (-)  (units    

(unknown)



                    date)                                   unknown)  

 

           (unknown)  (no       (unknown)  (unknown)  Evaluation  (units    (unk

nown)



                    date)                                   unknown)  

 

           (unknown)  (no       (unknown)  (unknown)  Family History  (units    

(unknown)



                    date)                         (Updated  unknown)  



                                                  09/10/22 @ 21:49           



                                                  by Fatoumata Flores)           

 

           (unknown)  (no       (unknown)  (unknown)  Family/Other  (units    (u

nknown)



                    date)                         Multiple  unknown)  



                                                  sclerosis           

 

           (unknown)  (no       (unknown)  (unknown)  Father    (units    (unkno

wn)



                    date)                         Hypertension unknown)  

 

           (unknown)  (no       (unknown)  (unknown)  Fetal Monitor  (units    (

unknown)



                    date)                         Decelerations: unknown)  



                                                  Absent              

 

           (unknown)  (no       (unknown)  (unknown)  Fetal Status:  (units    (

unknown)



                    date)                         Category l unknown)  

 

           (unknown)  (no       (unknown)  (unknown)  Fetal monitor  (units    (

unknown)



                    date)                         accelerations: unknown)  



                                                  Present             

 

           (unknown)  (no       (unknown)  (unknown)  Final Diagnosis  (units   

 (unknown)



                    date)                                   unknown)  

 

           (unknown)  (no       (unknown)  (unknown)  Foot pain  (units    (unkn

own)



                    date)                         ()   unknown)  

 

           (unknown)  (no       (unknown)  (unknown)  Grandfather  (units    (un

known)



                    date)                          Cancer unknown)  

 

           (unknown)  (no       (unknown)  (unknown)  Grandfather  (units    (un

known)



                    date)                          Stroke unknown)  

 

           (unknown)  (no       (unknown)  (unknown)  Grandmother  (units    (un

known)



                    date)                          History unknown)  



                                                  of heart disease           

 

           (unknown)  (no       (unknown)  (unknown)  Grandmother  (units    (un

known)



                    date)                           unknown)  



                                                  Multiple            



                                                  sclerosis           

 

           (unknown)  (no       (unknown)  (unknown)  History of  (units    (unk

nown)



                    date)                         recurrent unknown)  



                                                  miscarriages           

 

           (unknown)  (no       (unknown)  (unknown)  Infertility  (units    (un

known)



                    date)                         ()   unknown)  

 

           (unknown)  (no       (unknown)  (unknown)  Irregular  (units    (unkn

own)



                    date)                         menstrual cycle unknown)  



                                                  ()             

 

           (unknown)  (no       (unknown)  (unknown)  [facility]  (units   

 (unknown)



                    date)                         [address] [place] [address] unknown)  



                                                  [place]           



                                                  [id number]               

 

           (unknown)  (no       (unknown)  (unknown)  Labor and  (units    (unkn

own)



                    date)                         Delivery Triage unknown)  



                                                  Note                

 

           (unknown)  (no       (unknown)  (unknown)  Lipoma    (units    (unkno

wn)



                    date)                                   unknown)  

 

           (unknown)  (no       (unknown)  (unknown)  Medical History  (units   

 (unknown)



                    date)                         (Updated  unknown)  



                                                  23 @ 13:59           



                                                  by Fanny Baker MD)           

 

           (unknown)  (no       (unknown)  (unknown)  Mother Gastric  (units    

(unknown)



                    date)                         cancer    unknown)  

 

           (unknown)  (no       (unknown)  (unknown)  OB Disposition:  (units   

 (unknown)



                    date)                         home      unknown)  

 

           (unknown)  (no       (unknown)  (unknown)  On-call OB  (units    (unk

nown)



                    date)                         Provider: Fanny unknown)  



                                                  ENZO Baker             

 

           (unknown)  (no       (unknown)  (unknown)  Ovarian cyst  (units    (u

nknown)



                    date)                         (-2018)   unknown)  

 

           (unknown)  (no       (unknown)  (unknown)  PCOS      (units    (unkno

wn)



                    date)                         (polycystic unknown)  



                                                  ovarian             



                                                  syndrome)           

 

           (unknown)  (no       (unknown)  (unknown)  PFSH      (units    (unkno

wn)



                    date)                                   unknown)  

 

           (unknown)  (no       (unknown)  (unknown)  Patient:  (units    (unkno

wn)



                    date)                         Libra Prieto unknown)  



                                                  MR#: [id number]            

 

           (unknown)  (no       (unknown)  (unknown)  Plan/Dispositio  (units   

 (unknown)



                    date)                         n         unknown)  

 

           (unknown)  (no       (unknown)  (unknown)  Plan:     (units    (unkno

wn)



                    date)                                   unknown)  

 

           (unknown)  (no       (unknown)  (unknown)  Plantar   (units    (unkno

wn)



                    date)                         fasciitis unknown)  

 

           (unknown)  (no       (unknown)  (unknown)  Primary OB  (units    (unk

nown)



                    date)                         Provider: unknown)  



                                                  Julieta Au           

 

           (unknown)  (no       (unknown)  (unknown)  Provider:  (units    (unkn

own)



                    date)                         Fanny Baker unknown)  



                                                  MD                  

 

           (unknown)  (no       (unknown)  (unknown)  Reactive  (units    (unkno

wn)



                    date)                         nonstress test unknown)  



                                                  keep normal OB           



                                                  appointment           

 

           (unknown)  (no       (unknown)  (unknown)  Reason for  (units    (unk

nown)



                    date)                         Evaluation: Yes unknown)  



                                                  non-stress test           



                                                  non-stress test           



                                                  reason: diabetes           

 

           (unknown)  (no       (unknown)  (unknown)  Shingles  (units    (unkno

wn)



                    date)                                   unknown)  

 

           (unknown)  (no       (unknown)  (unknown)  Signed    (units    (unkno

wn)



                    date)                         By:<Electronical unknown)  



                                                  ly signed by           



                                                  Fanny Baker MD>                 

 

           (unknown)  (no       (unknown)  (unknown)  Smoking Status:  (units   

 (unknown)



                    date)                         Never smoker unknown)  

 

           (unknown)  (no       (unknown)  (unknown)  Social History  (units    

(unknown)



                    date)                                   unknown)  

 

           (unknown)  (no       (unknown)  (unknown)  Status: Acute  (units    (

unknown)



                    date)                                   unknown)  

 

           (unknown)  (no       (unknown)  (unknown)  Surgical  (units    (unkno

wn)



                    date)                         History (Updated unknown)  



                                                  09/10/22 @ 21:46           



                                                  by Fatoumata Flores)           

 

           (unknown)  (no       (unknown)  (unknown)  Tumor     (units    (unkno

wn)



                    date)                         (-10/2012) unknown)  

 

           (unknown)  (no       (unknown)  (unknown)  Type(s) of  (units    (unk

nown)



                    date)                         exercise: unknown)  



                                                  bicycling           



                                                  (stationary           



                                                  bike), regular           



                                                  exercise, weight           

 

           (unknown)  (no       (unknown)  (unknown)  Variability:  (units    (u

nknown)



                    date)                         Moderate (11-25) unknown)  

 

           (unknown)  (no       (unknown)  (unknown)  Visit     (units    (unkno

wn)



                    date)                         Information unknown)  

 

           (unknown)  (no       (unknown)  (unknown)  Vital Signs  (units    (un

known)



                    date)                                   unknown)  

 

           (unknown)  (no       (unknown)  (unknown)  Vital Signs:  (units    (u

nknown)



                    date)                                   unknown)  

 

           (unknown)  (no       (unknown)  (unknown)  [place] teeth  (units    (u

nknown)



                    date)                         extracted unknown)  

 

           (unknown)  (no       (unknown)  (unknown)  alcohol intake:  (units   

 (unknown)



                    date)                         never     unknown)  

 

           (unknown)  (no       (unknown)  (unknown)  caffeine: Yes  (units    (

unknown)



                    date)                         (1-2 cups unknown)  



                                                  tea/day)            

 

           (unknown)  (no       (unknown)  (unknown)  carbon monox  (units    (u

nknown)



                    date)                         detector in unknown)  



                                                  home: Yes           

 

           (unknown)  (no       (unknown)  (unknown)  current   (units    (unkno

wn)



                    date)                         occupational unknown)  



                                                  exposures/hazard           



                                                  s: No               

 

           (unknown)  (no       (unknown)  (unknown)  daily servings  (units    

(unknown)



                    date)                         fruits/ve-4 unknown)  

 

           (unknown)  (no       (unknown)  (unknown)  do you feel  (units    (un

known)



                    date)                         safe at home: unknown)  



                                                  Yes                 

 

           (unknown)  (no       (unknown)  (unknown)  during the past  (units   

 (unknown)



                    date)                         year weight has: unknown)  



                                                  remained stable           

 

           (unknown)  (no       (unknown)  (unknown)  education  (units    (unkn

own)



                    date)                         level: college unknown)  



                                                  (Associate's           



                                                  degree)             

 

           (unknown)  (no       (unknown)  (unknown)  fire      (units    (unkno

wn)



                    date)                         extinguisher in unknown)  



                                                  home: Yes           

 

           (unknown)  (no       (unknown)  (unknown)  firearms in  (units    (un

known)



                    date)                         home: Yes unknown)  



                                                  firearms            



                                                  unloaded and           



                                                  locked: Yes           

 

           (unknown)  (no       (unknown)  (unknown)  frequency: 5-6  (units    

(unknown)



                    date)                         times per week unknown)  

 

           (unknown)  (no       (unknown)  (unknown)  household  (units    (unkn

own)



                    date)                         members: spouse unknown)  



                                                  and family           



                                                  (father and           



                                                  father-in-law)           

 

           (unknown)  (no       (unknown)  (unknown)  housing: house  (units    

(unknown)



                    date)                                   unknown)  

 

           (unknown)  (no       (unknown)  (unknown)  lifting and  (units    (un

known)



                    date)                         other (hiking) unknown)  

 

           (unknown)  (no       (unknown)  (unknown)  lives     (units    (unkno

wn)



                    date)                         independently: unknown)  



                                                  Yes                 

 

           (unknown)  (no       (unknown)  (unknown)  marital status:  (units   

 (unknown)



                    date)                            unknown)  

 

           (unknown)  (no       (unknown)  (unknown)  number of  (units    (unkn

own)



                    date)                         children: 0 unknown)  

 

           (unknown)  (no       (unknown)  (unknown)  occupational  (units    (u

nknown)



                    date)                         status: employed unknown)  



                                                  (active duty           



                                                  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            



                                                  electronics           



                                                  tech, desk job           

 

           (unknown)  (no       (unknown)  (unknown)  pets and  (units    (unkno

wn)



                    date)                         animals: Yes (1 unknown)  



                                                  large dog,           



                                                  chickens)           

 

           (unknown)  (no       (unknown)  (unknown)  seatbelt use:  (units    (

unknown)



                    date)                         always    unknown)  

 

           (unknown)  (no       (unknown)  (unknown)  second hand  (units    (un

known)



                    date)                         exposure: Yes unknown)  



                                                  (father-in-law           



                                                  smokes outside           



                                                  the house)           

 

           (unknown)  (no       (unknown)  (unknown)  special madi  (units    (

unknown)



                    date)                         needs: No unknown)  

 

           (unknown)  (no       (unknown)  (unknown)  substance use  (units    (

unknown)



                    date)                         type: does not unknown)  



                                                  use                 

 

           (unknown)  (no       (unknown)  (unknown)  travel history:  (units   

 (unknown)



                    date)                         over 6 months unknown)  



                                                  ago                 

 

           (unknown)  (no       (unknown)  (unknown)  water heater  (units    (u

nknown)



                    date)                         temp set < 120 unknown)  



                                                  deg: Yes            

 

           (unknown)  (no       (unknown)  (unknown)  well-balanced  (units    (

unknown)



                    date)                         diet: daily or unknown)  



                                                  most days           

 

           (unknown)  (no       (unknown)  (unknown)  while pregnant)  (units   

 (unknown)



                    date)                                   unknown)  

 

           (unknown)  (no       (unknown)  (unknown)  working smoke  (units    (

unknown)



                    date)                         detector in unknown)  



                                                  home: Yes           









                                         Result panel 27









           (unknown)  (no       (unknown)  (unknown)  (no value)  (units    (unk

nown)



                    date)                                   unknown)  

 

           (unknown)  (no       (unknown)  (unknown)  (1) Gestational  (units   

 (unknown)



                    date)                         diabetes: unknown)  

 

           (unknown)  (no       (unknown)  (unknown)  (2) 35 weeks  (units    (u

nknown)



                    date)                         gestation of unknown)  



                                                  pregnancy:           

 

           (unknown)  (no       (unknown)  (unknown)  (3) History of  (units    

(unknown)



                    date)                         recurrent unknown)  



                                                  miscarriages:           

 

           (unknown)  (no       (unknown)  (unknown)  (Oral glycemic  (units    

(unknown)



                    date)                         control) and unknown)  



                                                  other (Recurring           



                                                  miscarriages)           

 

           (unknown)  (no       (unknown)  (unknown)  23 1353  (units    (

unknown)



                    date)                                   unknown)  

 

           (unknown)  (no       (unknown)  (unknown)  [id number]   (units    (unkno

wn)



                    date)                                   unknown)  

 

           (unknown)  (no       (unknown)  (unknown)  Age/Sex: 28 / F  (units   

 (unknown)



                    date)                                   unknown)  

 

           (unknown)  (no       (unknown)  (unknown)  Allergies  (units    (unkn

own)



                    date)                         ()   unknown)  

 

           (unknown)  (no       (unknown)  (unknown)  Anesthesia  (units    (unk

nown)



                    date)                                   unknown)  

 

           (unknown)  (no       (unknown)  (unknown)  Anxiety (-2016)  (units   

 (unknown)



                    date)                                   unknown)  

 

           (unknown)  (no       (unknown)  (unknown)  Arrhythmia  (units    (unk

nown)



                    date)                                   unknown)  

 

           (unknown)  (no       (unknown)  (unknown)  Baseline fetal  (units    

(unknown)



                    date)                         heart rate: 140 unknown)  

 

           (unknown)  (no       (unknown)  (unknown)  Blood pressure  (units    

(unknown)



                    date)                         105/59, pulse 96 unknown)  

 

           (unknown)  (no       (unknown)  (unknown)  Category of  (units    (un

known)



                    date)                         Tracing:  unknown)  



                                                  Reactive            

 

           (unknown)  (no       (unknown)  (unknown)  Chicken pox  (units    (un

known)



                    date)                         ()   unknown)  

 

           (unknown)  (no       (unknown)  (unknown)  Contraction  (units    (un

known)



                    date)                         Frequency unknown)  



                                                  (minutes): 0           

 

           (unknown)  (no       (unknown)  (unknown)  : 1995  (units   

 (unknown)



                    date)                         Acct:[id number] unknown)  

 

           (unknown)  (no       (unknown)  (unknown)  Date of   (units    (unkno

wn)



                    date)                         Service:  unknown)  



                                                  23            

 

           (unknown)  (no       (unknown)  (unknown)  Date of   (units    (unkno

wn)



                    date)                         evaluation: unknown)  



                                                  23            

 

           (unknown)  (no       (unknown)  (unknown)  Depression  (units    (unk

nown)



                    date)                         ()   unknown)  

 

           (unknown)  (no       (unknown)  (unknown)  Diagnosis,  (units    (unk

nown)



                    date)                         Plan/Disposition unknown)  

 

           (unknown)  (no       (unknown)  (unknown)  Eczema (-)  (units    

(unknown)



                    date)                                   unknown)  

 

           (unknown)  (no       (unknown)  (unknown)  Evaluation  (units    (unk

nown)



                    date)                                   unknown)  

 

           (unknown)  (no       (unknown)  (unknown)  Family History  (units    

(unknown)



                    date)                         (Updated  unknown)  



                                                  09/10/22 @ 21:49           



                                                  by Fatoumata Floers)           

 

           (unknown)  (no       (unknown)  (unknown)  Family/Other  (units    (u

nknown)



                    date)                         Multiple  unknown)  



                                                  sclerosis           

 

           (unknown)  (no       (unknown)  (unknown)  Father    (units    (unkno

wn)



                    date)                         Hypertension unknown)  

 

           (unknown)  (no       (unknown)  (unknown)  Fetal Status:  (units    (

unknown)



                    date)                         Category l unknown)  

 

           (unknown)  (no       (unknown)  (unknown)  Fetal monitor  (units    (

unknown)



                    date)                         accelerations: unknown)  



                                                  Present             

 

           (unknown)  (no       (unknown)  (unknown)  Final Diagnosis  (units   

 (unknown)



                    date)                                   unknown)  

 

           (unknown)  (no       (unknown)  (unknown)  Foot pain  (units    (unkn

own)



                    date)                         ()   unknown)  

 

           (unknown)  (no       (unknown)  (unknown)  Grandfather  (units    (un

known)



                    date)                          Cancer unknown)  

 

           (unknown)  (no       (unknown)  (unknown)  Grandfather  (units    (un

known)



                    date)                          Stroke unknown)  

 

           (unknown)  (no       (unknown)  (unknown)  Grandmother  (units    (un

known)



                    date)                          History unknown)  



                                                  of heart disease           

 

           (unknown)  (no       (unknown)  (unknown)  Grandmother  (units    (un

known)



                    date)                           unknown)  



                                                  Multiple            



                                                  sclerosis           

 

           (unknown)  (no       (unknown)  (unknown)  History of  (units    (unk

nown)



                    date)                         recurrent unknown)  



                                                  miscarriages           

 

           (unknown)  (no       (unknown)  (unknown)  Infertility  (units    (un

known)



                    date)                         ()   unknown)  

 

           (unknown)  (no       (unknown)  (unknown)  Irregular  (units    (unkn

own)



                    date)                         menstrual cycle unknown)  



                                                  ()             

 

           (unknown)  (no       (unknown)  (unknown)  [facility]  (units   

 (unknown)



                    date)                         [address] unknown)  



                                                  [place]           



                                                  [id number]               

 

           (unknown)  (no       (unknown)  (unknown)  Labor and  (units    (unkn

own)



                    date)                         Delivery Triage unknown)  



                                                  Note                

 

           (unknown)  (no       (unknown)  (unknown)  Lipoma    (units    (unkno

wn)



                    date)                                   unknown)  

 

           (unknown)  (no       (unknown)  (unknown)  Medical History  (units   

 (unknown)



                    date)                         (Updated  unknown)  



                                                  23 @ 13:53           



                                                  by Fanny Baker MD)           

 

           (unknown)  (no       (unknown)  (unknown)  Mother Gastric  (units    

(unknown)



                    date)                         cancer    unknown)  

 

           (unknown)  (no       (unknown)  (unknown)  OB Disposition:  (units   

 (unknown)



                    date)                         home      unknown)  

 

           (unknown)  (no       (unknown)  (unknown)  On metformin  (units    (u

nknown)



                    date)                                   unknown)  

 

           (unknown)  (no       (unknown)  (unknown)  On-call OB  (units    (unk

nown)



                    date)                         Provider: Fanny unknown)  



                                                  ENZO Baker             

 

           (unknown)  (no       (unknown)  (unknown)  Ovarian cyst  (units    (u

nknown)



                    date)                         ()   unknown)  

 

           (unknown)  (no       (unknown)  (unknown)  PCOS      (units    (unkno

wn)



                    date)                         (polycystic unknown)  



                                                  ovarian             



                                                  syndrome)           

 

           (unknown)  (no       (unknown)  (unknown)  PFSH      (units    (unkno

wn)



                    date)                                   unknown)  

 

           (unknown)  (no       (unknown)  (unknown)  Patient:  (units    (unkno

wn)



                    date)                         Libra Prieto unknown)  



                                                  MR#: [id number]            

 

           (unknown)  (no       (unknown)  (unknown)  Plan/Dispositio  (units   

 (unknown)



                    date)                         n         unknown)  

 

           (unknown)  (no       (unknown)  (unknown)  Plan:     (units    (unkno

wn)



                    date)                                   unknown)  

 

           (unknown)  (no       (unknown)  (unknown)  Plantar   (units    (unkno

wn)



                    date)                         fasciitis unknown)  

 

           (unknown)  (no       (unknown)  (unknown)  Primary OB  (units    (unk

nown)



                    date)                         Provider: unknown)  



                                                  Julieta Au           

 

           (unknown)  (no       (unknown)  (unknown)  Problem   (units    (unkno

wn)



                    date)                         details:  unknown)  

 

           (unknown)  (no       (unknown)  (unknown)  Provider:  (units    (unkn

own)



                    date)                         Fanny Baker unknown)  



                                                  MD                  

 

           (unknown)  (no       (unknown)  (unknown)  Reactive  (units    (unkno

wn)



                    date)                         nonstress test. unknown)  



                                                  Continue weekly           



                                                  nonstress tests           



                                                  and OB visits.           

 

           (unknown)  (no       (unknown)  (unknown)  Reason for  (units    (unk

nown)



                    date)                         Evaluation: Yes unknown)  



                                                  non-stress test           



                                                  non-stress test           



                                                  reason: diabetes           

 

           (unknown)  (no       (unknown)  (unknown)  Shingles  (units    (unkno

wn)



                    date)                                   unknown)  

 

           (unknown)  (no       (unknown)  (unknown)  Signed    (units    (unkno

wn)



                    date)                         By:<Electronical unknown)  



                                                  ly signed by           



                                                  Fanny Baker MD>                 

 

           (unknown)  (no       (unknown)  (unknown)  Smoking Status:  (units   

 (unknown)



                    date)                         Never smoker unknown)  

 

           (unknown)  (no       (unknown)  (unknown)  Social History  (units    

(unknown)



                    date)                                   unknown)  

 

           (unknown)  (no       (unknown)  (unknown)  Status: Acute  (units    (

unknown)



                    date)                                   unknown)  

 

           (unknown)  (no       (unknown)  (unknown)  Surgical  (units    (unkno

wn)



                    date)                         History (Updated unknown)  



                                                  09/10/22 @ 21:46           



                                                  by Fatoumata Flores)           

 

           (unknown)  (no       (unknown)  (unknown)  Tumor     (units    (unkno

wn)



                    date)                         (-10/2012) unknown)  

 

           (unknown)  (no       (unknown)  (unknown)  Type(s) of  (units    (unk

nown)



                    date)                         exercise: unknown)  



                                                  bicycling           



                                                  (stationary           



                                                  bike), regular           



                                                  exercise, weight           

 

           (unknown)  (no       (unknown)  (unknown)  Variability:  (units    (u

nknown)



                    date)                         Moderate (11-25) unknown)  

 

           (unknown)  (no       (unknown)  (unknown)  Visit     (units    (unkno

wn)



                    date)                         Information unknown)  

 

           (unknown)  (no       (unknown)  (unknown)  Vital Signs  (units    (un

known)



                    date)                                   unknown)  

 

           (unknown)  (no       (unknown)  (unknown)  Vital Signs:  (units    (u

nknown)



                    date)                                   unknown)  

 

           (unknown)  (no       (unknown)  (unknown)  [place] teeth  (units    (u

nknown)



                    date)                         extracted unknown)  

 

           (unknown)  (no       (unknown)  (unknown)  alcohol intake:  (units   

 (unknown)



                    date)                         never     unknown)  

 

           (unknown)  (no       (unknown)  (unknown)  caffeine: Yes  (units    (

unknown)



                    date)                         (1-2 cups unknown)  



                                                  tea/day)            

 

           (unknown)  (no       (unknown)  (unknown)  carbon monox  (units    (u

nknown)



                    date)                         detector in unknown)  



                                                  home: Yes           

 

           (unknown)  (no       (unknown)  (unknown)  current   (units    (unkno

wn)



                    date)                         occupational unknown)  



                                                  exposures/hazard           



                                                  s: No               

 

           (unknown)  (no       (unknown)  (unknown)  daily servings  (units    

(unknown)



                    date)                         fruits/ve-4 unknown)  

 

           (unknown)  (no       (unknown)  (unknown)  do you feel  (units    (un

known)



                    date)                         safe at home: unknown)  



                                                  Yes                 

 

           (unknown)  (no       (unknown)  (unknown)  during the past  (units   

 (unknown)



                    date)                         year weight has: unknown)  



                                                  remained stable           

 

           (unknown)  (no       (unknown)  (unknown)  education  (units    (unkn

own)



                    date)                         level: college unknown)  



                                                  (Associate's           



                                                  degree)             

 

           (unknown)  (no       (unknown)  (unknown)  fire      (units    (unkno

wn)



                    date)                         extinguisher in unknown)  



                                                  home: Yes           

 

           (unknown)  (no       (unknown)  (unknown)  firearms in  (units    (un

known)



                    date)                         home: Yes unknown)  



                                                  firearms            



                                                  unloaded and           



                                                  locked: Yes           

 

           (unknown)  (no       (unknown)  (unknown)  frequency: 5-6  (units    

(unknown)



                    date)                         times per week unknown)  

 

           (unknown)  (no       (unknown)  (unknown)  household  (units    (unkn

own)



                    date)                         members: spouse unknown)  



                                                  and family           



                                                  (father and           



                                                  father-in-law)           

 

           (unknown)  (no       (unknown)  (unknown)  housing: house  (units    

(unknown)



                    date)                                   unknown)  

 

           (unknown)  (no       (unknown)  (unknown)  lifting and  (units    (un

known)



                    date)                         other (hiking) unknown)  

 

           (unknown)  (no       (unknown)  (unknown)  lives     (units    (unkno

wn)



                    date)                         independently: unknown)  



                                                  Yes                 

 

           (unknown)  (no       (unknown)  (unknown)  marital status:  (units   

 (unknown)



                    date)                            unknown)  

 

           (unknown)  (no       (unknown)  (unknown)  number of  (units    (unkn

own)



                    date)                         children: 0 unknown)  

 

           (unknown)  (no       (unknown)  (unknown)  occupational  (units    (u

nknown)



                    date)                         status: employed unknown)  



                                                  (active duty           



                                                  [employer]           



                                                  tech, desk job           

 

           (unknown)  (no       (unknown)  (unknown)  pets and  (units    (unkno

wn)



                    date)                         animals: Yes (1 unknown)  



                                                  large dog,           



                                                  chickens)           

 

           (unknown)  (no       (unknown)  (unknown)  seatbelt use:  (units    (

unknown)



                    date)                         always    unknown)  

 

           (unknown)  (no       (unknown)  (unknown)  second hand  (units    (un

known)



                    date)                         exposure: Yes unknown)  



                                                  (father-in-law           



                                                  smokes outside           



                                                  the house)           

 

           (unknown)  (no       (unknown)  (unknown)  special madi  (units    (

unknown)



                    date)                         needs: No unknown)  

 

           (unknown)  (no       (unknown)  (unknown)  substance use  (units    (

unknown)



                    date)                         type: does not unknown)  



                                                  use                 

 

           (unknown)  (no       (unknown)  (unknown)  travel history:  (units   

 (unknown)



                    date)                         over 6 months unknown)  



                                                  ago                 

 

           (unknown)  (no       (unknown)  (unknown)  water heater  (units    (u

nknown)



                    date)                         temp set < 120 unknown)  



                                                  deg: Yes            

 

           (unknown)  (no       (unknown)  (unknown)  well-balanced  (units    (

unknown)



                    date)                         diet: daily or unknown)  



                                                  most days           

 

           (unknown)  (no       (unknown)  (unknown)  while pregnant)  (units   

 (unknown)



                    date)                                   unknown)  

 

           (unknown)  (no       (unknown)  (unknown)  working smoke  (units    (

unknown)



                    date)                         detector in unknown)  



                                                  home: Yes           









                                         Result panel 28









           (unknown)  (no       (unknown)  (unknown)  (no value)  (units    (unk

nown)



                    date)                                   unknown)  

 

           (unknown)  (no       (unknown)  (unknown)  (+12 lb) 120/58  (units   

 (unknown)



                    date)                         N         unknown)  

 

           (unknown)  (no       (unknown)  (unknown)  (+13 lb) 104/54  (units   

 (unknown)



                    date)                         N         unknown)  

 

           (unknown)  (no       (unknown)  (unknown)  (+18 lb) 122/60  (units   

 (unknown)



                    date)                         N         unknown)  

 

           (unknown)  (no       (unknown)  (unknown)  (+22 lb) 110/62  (units   

 (unknown)



                    date)                         N         unknown)  

 

           (unknown)  (no       (unknown)  (unknown)  (+23 lb) 116/62  (units   

 (unknown)



                    date)                         N         unknown)  

 

           (unknown)  (no       (unknown)  (unknown)  (+24 lb) 128/62  (units   

 (unknown)



                    date)                         N         unknown)  

 

           (unknown)  (no       (unknown)  (unknown)  (+7 lb) 128/66 N  (units  

  (unknown)



                    date)                                   unknown)  

 

           (unknown)  (no       (unknown)  (unknown)  (+8 lb) 122/68 N  (units  

  (unknown)



                    date)                                   unknown)  

 

           (unknown)  (no       (unknown)  (unknown)  **Genetic  (units    (unkn

own)



                    date)                         Screening/Teratol unknown)  



                                                  ogy Counseling -           



                                                  Includes patient,           



                                                  baby's father, or           

 

           (unknown)  (no       (unknown)  (unknown)  -?-?-?-?-?-?-?-?  (units  

  (unknown)



                    date)                         -?-?-?-?  unknown)  

 

           (unknown)  (no       (unknown)  (unknown)  23  (units    (unkno

wn)



                    date)                                   unknown)  

 

           (unknown)  (no       (unknown)  (unknown)  23  (units    (unkno

wn)



                    date)                                   unknown)  

 

           (unknown)  (no       (unknown)  (unknown)  23  (units    (unkno

wn)



                    date)                                   unknown)  

 

           (unknown)  (no       (unknown)  (unknown)  23  (units    (unkno

wn)



                    date)                                   unknown)  

 

           (unknown)  (no       (unknown)  (unknown)  23  (units    (unkno

wn)



                    date)                                   unknown)  

 

           (unknown)  (no       (unknown)  (unknown)  23]  (units    (unkn

own)



                    date)                                   unknown)  

 

           (unknown)  (no       (unknown)  (unknown)  04/15/23  (units    (unkno

wn)



                    date)                         Ultrasound #1 36w unknown)  



                                                  2d                  

 

           (unknown)  (no       (unknown)  (unknown)  [id number]   (units    (unkno

wn)



                    date)                                   unknown)  

 

           (unknown)  (no       (unknown)  (unknown)  22  (units    (unkno

wn)



                    date)                                   unknown)  

 

           (unknown)  (no       (unknown)  (unknown)  10/11/22  (units    (unkno

wn)



                    date)                                   unknown)  

 

           (unknown)  (no       (unknown)  (unknown)  22  (units    (unkno

wn)



                    date)                                   unknown)  

 

           (unknown)  (no       (unknown)  (unknown)  22  (units    (unkno

wn)



                    date)                                   unknown)  

 

           (unknown)  (no       (unknown)  (unknown)  12w 6d 163 lb  (units    (

unknown)



                    date)                                   unknown)  

 

           (unknown)  (no       (unknown)  (unknown)  14:37     (units    (unkno

wn)



                    date)                                   unknown)  

 

           (unknown)  (no       (unknown)  (unknown)  16w 6d 167 lb  (units    (

unknown)



                    date)                                   unknown)  

 

           (unknown)  (no       (unknown)  (unknown)  20w 5d 168 lb  (units    (

unknown)



                    date)                                   unknown)  

 

           (unknown)  (no       (unknown)  (unknown)  24w 6d 173 lb  (units    (

unknown)



                    date)                                   unknown)  

 

           (unknown)  (no       (unknown)  (unknown)  28w 6d 177 lb  (units    (

unknown)



                    date)                                   unknown)  

 

           (unknown)  (no       (unknown)  (unknown)  3 oz. 57%ile.  (units    (

unknown)



                    date)                         TIFFANIE 15.87 cm. unknown)  



                                                  Grade 0 placenta.           



                                                  Plan: Increase           



                                                  metformin to           

 

           (unknown)  (no       (unknown)  (unknown)  3 wks     (units    (unkno

wn)



                    date)                                   unknown)  

 

           (unknown)  (no       (unknown)  (unknown)  31w 6d 179 lb  (units    (

unknown)



                    date)                                   unknown)  

 

           (unknown)  (no       (unknown)  (unknown)  33w 5d 178 lb  (units    (

unknown)



                    date)                                   unknown)  

 

           (unknown)  (no       (unknown)  (unknown)  4 wk      (units    (unkno

wn)



                    date)                                   unknown)  

 

           (unknown)  (no       (unknown)  (unknown)  750 mg twice a  (units    

(unknown)



                    date)                         day. She will unknown)  



                                                  follow-up in 1           



                                                  week for a           



                                                  nonstress test.           



                                                  She                 

 

           (unknown)  (no       (unknown)  (unknown)  8w 6d 162 lb  (units    (u

nknown)



                    date)                                   unknown)  

 

           (unknown)  (no       (unknown)  (unknown)  Abnormal lab  (units    (u

nknown)



                    date)                         values 1st unknown)  



                                                  trimester:           



                                                  discussed           

 

           (unknown)  (no       (unknown)  (unknown)  Abnormal lab  (units    (u

nknown)



                    date)                         values 2nd unknown)  



                                                  trimester:           



                                                  discussed           

 

           (unknown)  (no       (unknown)  (unknown)  Add'l Plan  (units    (unk

nown)



                    date)                         Details   unknown)  

 

           (unknown)  (no       (unknown)  (unknown)  Age/Sex: 28 / F  (units   

 (unknown)



                    date)                         Date of Service: unknown)  

 

           (unknown)  (no       (unknown)  (unknown)  Allergies  (units    (unkn

own)



                    date)                         (-)   unknown)  

 

           (unknown)  (no       (unknown)  (unknown)  Allergies  (units    (unkn

own)



                    date)                                   unknown)  

 

           (unknown)  (no       (unknown)  (unknown)  [place]  (units    (

unknown)



                    date)                         [id number]     unknown)  

 

           (unknown)  (no       (unknown)  (unknown)  Anesthesia  (units    (unk

nown)



                    date)                                   unknown)  

 

           (unknown)  (no       (unknown)  (unknown)  Aneuploidy  (units    (unk

nown)



                    date)                         Screening unknown)  



                                                  Offered: Accepted           



                                                  (wants CFDNA)           

 

           (unknown)  (no       (unknown)  (unknown)  Anticipated  (units    (un

known)



                    date)                         course of unknown)  



                                                  prenatal care:           



                                                  discussed           

 

           (unknown)  (no       (unknown)  (unknown)  Anxiety (-)  (units   

 (unknown)



                    date)                                   unknown)  

 

           (unknown)  (no       (unknown)  (unknown)  Arrhythmia  (units    (unk

nown)



                    date)                                   unknown)  

 

           (unknown)  (no       (unknown)  (unknown)  Assessment and  (units    

(unknown)



                    date)                         Plan      unknown)  

 

           (unknown)  (no       (unknown)  (unknown)  Attending Dr:  (units    (

unknown)



                    date)                         Fanny Baker MD unknown)  

 

           (unknown)  (no       (unknown)  (unknown)  Libra presents  (units   

 (unknown)



                    date)                         today for routine unknown)  



                                                  OB f/u at 20w5d.           



                                                  She reports good           



                                                  fetal               

 

           (unknown)  (no       (unknown)  (unknown)  BMI 29.2  (units    (unkno

wn)



                    date)                                   unknown)  

 

           (unknown)  (no       (unknown)  (unknown)  /60  (units    (unkn

own)



                    date)                                   unknown)  

 

           (unknown)  (no       (unknown)  (unknown)  Birth     (units    (unkno

wn)



                    date)                         Plan/Preferences unknown)  

 

           (unknown)  (no       (unknown)  (unknown)  Birth Planning  (units    

(unknown)



                    date)                                   unknown)  

 

           (unknown)  (no       (unknown)  (unknown)  Blood Pressure  (units    

(unknown)



                    date)                         Location Lt unknown)  



                                                  brachial            

 

           (unknown)  (no       (unknown)  (unknown)  Blood     (units    (unkno

wn)



                    date)                         transfusions?: unknown)  



                                                  yes (Never had           



                                                  but would accept)           

 

           (unknown)  (no       (unknown)  (unknown)  Breastfeeding:  (units    

(unknown)



                    date)                         discussed unknown)  

 

           (unknown)  (no       (unknown)  (unknown)  Chicken pox  (units    (un

known)



                    date)                         ()   unknown)  

 

           (unknown)  (no       (unknown)  (unknown)  Childbirth  (units    (unk

nown)



                    date)                         Classes:  unknown)  



                                                  discussed           

 

           (unknown)  (no       (unknown)  (unknown)  Conceived  (units    (unkn

own)



                    date)                         naturally this unknown)  



                                                  pregnancy           

 

           (unknown)  (no       (unknown)  (unknown)  Confirmed  (units    (unkn

own)



                    date)                         23] unknown)  

 

           (unknown)  (no       (unknown)  (unknown)  Current Estimate  (units  

  (unknown)



                    date)                         23  unknown)  



                                                  Ultrasound #2 35w           



                                                  5d                  

 

           (unknown)  (no       (unknown)  (unknown)  Current   (units    (unkno

wn)



                    date)                         Pregnancy History unknown)  

 

           (unknown)  (no       (unknown)  (unknown)  DNA       (units    (unkno

wn)



                    date)                                   unknown)  

 

           (unknown)  (no       (unknown)  (unknown)  : 1995  (units   

 (unknown)



                    date)                         Acct:[id number] unknown)  

 

           (unknown)  (no       (unknown)  (unknown)  Date of positive  (units  

  (unknown)



                    date)                         home pregnancy unknown)  



                                                  test: 08/15/22           

 

           (unknown)  (no       (unknown)  (unknown)  Date      (units    (unkno

wn)



                    date)                                   unknown)  

 

           (unknown)  (no       (unknown)  (unknown) Denies Congenital  (units  

  (unknown)



                    date)                         Heart Defect, unknown)  



                                                  Denies Down           



                                                  Syndrome, Denies           



                                                  Muscular            



                                                  Dystrophy,           

 

           (unknown)  (no       (unknown)  (unknown)  Denies Maternal  (units   

 (unknown)



                    date)                         Metabolic unknown)  



                                                  Disorder (EG,TYPE           



                                                  1 Diabetes, PKU),           



                                                  Denies Patient or           

 

           (unknown)  (no       (unknown)  (unknown)  Denies Neural  (units    (

unknown)



                    date)                         Tube Defect unknown)  



                                                  (Meningomyelocele           



                                                  , Spina Bifida,           



                                                  or Anencephaly),           

 

           (unknown)  (no       (unknown)  (unknown)  Denies Sickle  (units    (

unknown)



                    date)                         Cell Disease or unknown)  



                                                  Trait (),           



                                                  Denies Hemophilia           



                                                  or other blood           

 

           (unknown)  (no       (unknown)  (unknown)  Denies [place]  (units  

  (unknown)



                    date)                         (Ashkenazi unknown)  



                                                  [employer], [place],           



                                                  [place]),           



                                                  Denies [place]           

 

           (unknown)  (no       (unknown)  (unknown)  Depression  (units    (unk

nown)



                    date)                         (-2016)   unknown)  

 

           (unknown)  (no       (unknown)  (unknown)  Depression:  (units    (un

known)



                    date)                         discussed unknown)  

 

           (unknown)  (no       (unknown)  (unknown)  Dept at   (units    (unkno

wn)



                    date)                         [phone] unknown)  

 

           (unknown)  (no       (unknown)  (unknown)  Diet and  (units    (unkno

wn)



                    date)                         Exercise  unknown)  

 

           (unknown)  (no       (unknown)  (unknown)  Disease   (units    (unkno

wn)



                    date)                         (Ashkenazi unknown)  



                                                  Advent), Denies           



                                                  Familial            



                                                  Dysautonomia           



                                                  (Ashkenazi           



                                                  Advent),            

 

           (unknown)  (no       (unknown)  (unknown)  Documented By:  (units    

(unknown)



                    date)                         Fanny Baker MD unknown)  



                                                  23 1437           

 

           (unknown)  (no       (unknown)  (unknown)  [employer]     (units    (unkno

wn)



                    date)                                   unknown)  

 

           (unknown)  (no       (unknown)  (unknown)  MEHNAZ Calculator  (units    

(unknown)



                    date)                                   unknown)  

 

           (unknown)  (no       (unknown)  (unknown)  EGA Weight BP  (units    (

unknown)



                    date)                         UGlucose  unknown)  

 

           (unknown)  (no       (unknown)  (unknown)  Eczema (-2000)  (units    

(unknown)



                    date)                                   unknown)  

 

           (unknown)  (no       (unknown)  (unknown)  Estimated  (units    (unkn

own)



                    date)                         Delivery Date unknown)  



                                                  Method Current           

 

           (unknown)  (no       (unknown)  (unknown)  Exercise and  (units    (u

nknown)



                    date)                         activity, unknown)  



                                                  work/environmenta           



                                                  l/hazards, Sexual           



                                                  activity, X-ray           

 

           (unknown)  (no       (unknown)  (unknown)  F/u in 4 wks.  (units    (

unknown)



                    date)                                   unknown)  

 

           (unknown)  (no       (unknown)  (unknown)  Family History  (units    

(unknown)



                    date)                         (Updated 09/10/22 unknown)  



                                                  @ 21:49 by Fatoumata Flores)             

 

           (unknown)  (no       (unknown)  (unknown)  Family/Other  (units    (u

nknown)



                    date)                         Multiple  unknown)  



                                                  sclerosis           

 

           (unknown)  (no       (unknown)  (unknown)  Father    (units    (unkno

wn)



                    date)                         Hypertension unknown)  

 

           (unknown)  (no       (unknown)  (unknown)  Father of Baby:  (units   

 (unknown)



                    date)                         same      unknown)  

 

           (unknown)  (no       (unknown)  (unknown)  [facility]  (units   

 (unknown)



                    date)                         [facility] unknown)  

 

           (unknown)  (no       (unknown)  (unknown)  First Trimester  (units   

 (unknown)



                    date)                         Education unknown)  



                                                  Checklist           

 

           (unknown)  (no       (unknown)  (unknown)  Foot pain  (units    (unkn

own)



                    date)                         (-)   unknown)  

 

           (unknown)  (no       (unknown)  (unknown)   (SAB  (units    (unkn

own)



                    date)                         x7),Fertility Tx, unknown)  



                                                  meds only,           



                                                  started on baby           



                                                  aspirin             



                                                  10/11/2022           

 

           (unknown)  (no       (unknown)  (unknown)  GDM, on   (units    (unkno

wn)



                    date)                         Metformin 500mg unknown)  



                                                  BID, increased to           



                                                  750mg BID 3/6/23           

 

           (unknown)  (no       (unknown)  (unknown)  Genetic   (units    (unkno

wn)



                    date)                         Screening + unknown)  



                                                  Counseling           

 

           (unknown)  (no       (unknown)  (unknown)  Genetic   (units    (unkno

wn)



                    date)                         Screening unknown)  

 

           (unknown)  (no       (unknown)  (unknown)  Good fetal  (units    (unk

nown)



                    date)                         movement. No unknown)  



                                                  leakage of fluid           



                                                  or vaginal           



                                                  bleeding. No           



                                                  regular             

 

           (unknown)  (no       (unknown)  (unknown)  Grandfather  (units    (un

known)



                    date)                          Cancer unknown)  

 

           (unknown)  (no       (unknown)  (unknown)  Grandfather  (units    (un

known)



                    date)                          Stroke unknown)  

 

           (unknown)  (no       (unknown)  (unknown)  Grandmother  (units    (un

known)



                    date)                          History unknown)  



                                                  of heart disease           

 

           (unknown)  (no       (unknown)  (unknown)  Grandmother  (units    (un

known)



                    date)                          Multiple unknown)  



                                                  sclerosis           

 

           (unknown)  (no       (unknown)  (unknown)   8  (units    (unkn

own)



                    date)                         Multiple births 0 unknown)  

 

           (unknown)  (no       (unknown)  (unknown)  HIV risk  (units    (unkno

wn)



                    date)                         evaluation: low unknown)  



                                                  risk                

 

           (unknown)  (no       (unknown)  (unknown)  Health Center  (units    (

unknown)



                    date)                         Education unknown)  

 

           (unknown)  (no       (unknown)  (unknown)  Health center  (units    (

unknown)



                    date)                         information: unknown)  



                                                  nature of           



                                                  practice            



                                                  discussed,           



                                                  prenatal            



                                                  personnel           

 

           (unknown)  (no       (unknown)  (unknown)  Height 5 ft 6 in  (units  

  (unknown)



                    date)                                   unknown)  

 

           (unknown)  (no       (unknown)  (unknown)  Hepatitis C risk  (units  

  (unknown)



                    date)                         evaluation: low unknown)  



                                                  risk                

 

           (unknown)  (no       (unknown)  (unknown)  History of  (units    (unk

nown)



                    date)                         Hepatitis B: No unknown)  

 

           (unknown)  (no       (unknown)  (unknown)  History of  (units    (unk

nown)



                    date)                         Hepatitis C: No unknown)  

 

           (unknown)  (no       (unknown)  (unknown)  History of  (units    (unk

nown)



                    date)                         recurrent unknown)  



                                                  miscarriages           

 

           (unknown)  (no       (unknown)  (unknown)  [facility]  (units    (u

nknown)



                    date)                                   unknown)  

 

           (unknown)  (no       (unknown)  (unknown)  [facility]  (units    (u

nknown)



                    date)                         Chorea, Denies unknown)  



                                                  Other inherited           



                                                  genetic or           



                                                  chromosomal           



                                                  disorder,           

 

           (unknown)  (no       (unknown)  (unknown)  , [place]  (units    (

unknown)



                    date)                         [place]  unknown)  

 

           (unknown)  (no       (unknown)  (unknown)  Hx #   (units    (u

nknown)



                    date)                         Pregnancies 0 unknown)  



                                                  Elective            



                                                  abortions 0           

 

           (unknown)  (no       (unknown)  (unknown)  Hx # Term  (units    (unkn

own)



                    date)                         Pregnancies 0 unknown)  



                                                  Ectopic             



                                                  pregnancies 0           

 

           (unknown)  (no       (unknown)  (unknown)  Infant will be  (units    

(unknown)



                    date)                         adopted?: no unknown)  

 

           (unknown)  (no       (unknown)  (unknown)  Infection  (units    (unkn

own)



                    date)                         History   unknown)  

 

           (unknown)  (no       (unknown)  (unknown)  Infectious  (units    (unk

nown)



                    date)                         Disease Education unknown)  

 

           (unknown)  (no       (unknown)  (unknown)  Infectious  (units    (unk

nown)



                    date)                         disease exposure: unknown)  



                                                  chicken pox           



                                                  immunity            



                                                  discussed,           



                                                  hepatitis risk           

 

           (unknown)  (no       (unknown)  (unknown)  Infertility  (units    (un

known)



                    date)                         (-2018)   unknown)  

 

           (unknown)  (no       (unknown)  (unknown)  Initial Weight:  (units   

 (unknown)



                    date)                         155 lb    unknown)  

 

           (unknown)  (no       (unknown)  (unknown)  Initials  (units    (unkno

wn)



                    date)                                   unknown)  

 

           (unknown)  (no       (unknown)  (unknown)  Intake Clinical  (units   

 (unknown)



                    date)                         Staff     unknown)  

 

           (unknown)  (no       (unknown)  (unknown)  Intake Note:  (units    (u

nknown)



                    date)                                   unknown)  

 

           (unknown)  (no       (unknown)  (unknown)  Intake performed  (units  

  (unknown)



                    date)                         by:       unknown)  



                                                  Edelmira Montero           

 

           (unknown)  (no       (unknown)  (unknown)  Intake    (units    (unkno

wn)



                    date)                                   unknown)  

 

           (unknown)  (no       (unknown)  (unknown)  Irregular  (units    (unkn

own)



                    date)                         menstrual cycle unknown)  



                                                  (-)             

 

           (unknown)  (no       (unknown)  (unknown)  LM        (units    (unkno

wn)



                    date)                                   unknown)  

 

           (unknown)  (no       (unknown)  (unknown)  Lipoma    (units    (unkno

wn)



                    date)                                   unknown)  

 

           (unknown)  (no       (unknown)  (unknown)  Live with  (units    (unkn

own)



                    date)                         someone with TB unknown)  



                                                  or exposed to TB:           



                                                  No                  

 

           (unknown)  (no       (unknown)  (unknown)  Loc: [facility]  (units    (unkno

wn)



                    date)                                   unknown)  

 

           (unknown)  (no       (unknown)  (unknown)  Marital status:  (units   

 (unknown)



                    date)                            unknown)  

 

           (unknown)  (no       (unknown)  (unknown)  Medical History  (units   

 (unknown)



                    date)                         (Updated 23 unknown)  



                                                  @ 13:53 by Fanny Baker MD)           

 

           (unknown)  (no       (unknown)  (unknown)  Medications  (units    (un

known)



                    date)                                   unknown)  

 

           (unknown)  (no       (unknown)  (unknown)  Mother Gastric  (units    

(unknown)



                    date)                         cancer    unknown)  

 

           (unknown)  (no       (unknown)  (unknown)  N No 142 13 N/A  (units   

 (unknown)



                    date)                         4wk       unknown)  

 

           (unknown)  (no       (unknown)  (unknown)  N No no 143 17  (units    

(unknown)



                    date)                         N/A absent AFP 4 unknown)  



                                                  wks                 

 

           (unknown)  (no       (unknown)  (unknown)  N No no 178 9  (units    (

unknown)



                    date)                         N/A absent unknown)  



                                                  long/closed AGA 9           

 

           (unknown)  (no       (unknown)  (unknown)  N Yes no 135 34  (units   

 (unknown)



                    date)                         Vertex absent 2 unknown)  



                                                  wks                 

 

           (unknown)  (no       (unknown)  (unknown)  N Yes no 141 24  (units   

 (unknown)



                    date)                         N/A absent 4 wks unknown)  

 

           (unknown)  (no       (unknown)  (unknown)  N Yes no 142 20  (units   

 (unknown)



                    date)                         N/A absent AFP unknown)  



                                                  negative            

 

           (unknown)  (no       (unknown)  (unknown)  N Yes no 144 29  (units   

 (unknown)



                    date)                         Vertex absent AGA unknown)  



                                                  29w5d               

 

           (unknown)  (no       (unknown)  (unknown)  N Yes no 150 32  (units   

 (unknown)



                    date)                         Vertex absent 2 unknown)  



                                                  wks                 

 

           (unknown)  (no       (unknown)  (unknown)  NF        (units    (unkno

wn)



                    date)                                   unknown)  

 

           (unknown)  (no       (unknown)  (unknown)  Notes     (units    (unkno

wn)



                    date)                                   unknown)  

 

           (unknown)  (no       (unknown)  (unknown)  Number of Living  (units  

  (unknown)



                    date)                         Children 0 unknown)  

 

           (unknown)  (no       (unknown)  (unknown)  Number of  (units    (unkn

own)



                    date)                         fetuses:: Single unknown)  

 

           (unknown)  (no       (unknown)  (unknown)  Nutrition and  (units    (

unknown)



                    date)                         weight gain unknown)  



                                                  counseling:           



                                                  special diet:           



                                                  discussed           

 

           (unknown)  (no       (unknown)  (unknown)  OB Office Visit  (units   

 (unknown)



                    date)                                   unknown)  

 

           (unknown)  (no       (unknown)  (unknown)  OB Visit Log  (units    (u

nknown)



                    date)                                   unknown)  

 

           (unknown)  (no       (unknown)  (unknown)  OB check  (units    (unkno

wn)



                    date)                                   unknown)  

 

           (unknown)  (no       (unknown)  (unknown)  On U/S: AGA  (units    (un

known)



                    date)                         29w5d. 3#4oz unknown)  



                                                  66%ile. Nl AFV.           



                                                  Plan: Metformin           



                                                  500mg BID. F/U 3           

 

           (unknown)  (no       (unknown)  (unknown)  On birth control  (units  

  (unknown)



                    date)                         at conception?: unknown)  



                                                  No                  

 

           (unknown)  (no       (unknown)  (unknown)  Other Estimates  (units   

 (unknown)



                    date)                         23 LMP unknown)  



                                                  (Certain) 37w 6d           

 

           (unknown)  (no       (unknown)  (unknown)  Ovarian cyst  (units    (u

nknown)



                    date)                         (-2018)   unknown)  

 

           (unknown)  (no       (unknown)  (unknown)  PCOS (polycystic  (units  

  (unknown)



                    date)                         ovarian syndrome) unknown)  

 

           (unknown)  (no       (unknown)  (unknown)  PFSH      (units    (unkno

wn)



                    date)                                   unknown)  

 

           (unknown)  (no       (unknown)  (unknown)  Pap performed?:  (units   

 (unknown)



                    date)                         No        unknown)  

 

           (unknown)  (no       (unknown)  (unknown)  Para 0    (units    (unkno

wn)



                    date)                         Spontaneous unknown)  



                                                  abortions 7           

 

           (unknown)  (no       (unknown)  (unknown)  Partner history  (units   

 (unknown)



                    date)                         of STD: denies hx unknown)  

 

           (unknown)  (no       (unknown)  (unknown)  Partner history  (units   

 (unknown)



                    date)                         of genital unknown)  



                                                  herpes: No           

 

           (unknown)  (no       (unknown)  (unknown)  Partner: Franklin  (units    (

unknown)



                    date)                         [place]  unknown)  

 

           (unknown)  (no       (unknown)  (unknown)  Patient comes in  (units  

  (unknown)



                    date)                         for follow-up OB unknown)  



                                                  visit. She had           



                                                  some pelvic pain           



                                                  that                

 

           (unknown)  (no       (unknown)  (unknown)  Patient presents  (units  

  (unknown)



                    date)                         for a new OB unknown)  



                                                  visit at 8 weeks           



                                                  gestation. She is           

 

           (unknown)  (no       (unknown)  (unknown)  Patient presents  (units  

  (unknown)



                    date)                         for a routine unknown)  



                                                  prenatal visit at           



                                                  33+ 5 weeks'           



                                                  gestation.           

 

           (unknown)  (no       (unknown)  (unknown)  Patient presents  (units  

  (unknown)



                    date)                         for a routine unknown)  



                                                  prenatal visit,           



                                                  accompanied by           



                                                  her .           

 

           (unknown)  (no       (unknown)  (unknown)  Patient's age 33  (units  

  (unknown)



                    date)                         years or older as unknown)  



                                                  of estimated date           



                                                  of delivery: No           

 

           (unknown)  (no       (unknown)  (unknown)  Patient:  (units    (unkno

wn)



                    date)                         Libra Prieto unknown)  



                                                  MR#: [id number]            

 

           (unknown)  (no       (unknown)  (unknown)  Pediatrician:  (units    (

unknown)



                    date)                         [facility] [place] vs unknown)  



                                                  [facility]           



                                                  [facility]           



                                                  [place]             

 

           (unknown)  (no       (unknown)  (unknown)  Personal history  (units  

  (unknown)



                    date)                         of STD: denies hx unknown)  

 

           (unknown)  (no       (unknown)  (unknown)  Personal history  (units  

  (unknown)



                    date)                         of genital unknown)  



                                                  herpes: No           

 

           (unknown)  (no       (unknown)  (unknown)  Plantar   (units    (unkno

wn)



                    date)                         fasciitis unknown)  

 

           (unknown)  (no       (unknown)  (unknown)  Position Sitting  (units  

  (unknown)



                    date)                                   unknown)  

 

           (unknown)  (no       (unknown)  (unknown)  Postpartum  (units    (unk

nown)



                    date)                         family    unknown)  



                                                  planning/Tubal           



                                                  sterilization:           



                                                  further             



                                                  discussion needed           

 

           (unknown)  (no       (unknown)  (unknown)  Pregnancy  (units    (unkn

own)



                    date)                         History   unknown)  

 

           (unknown)  (no       (unknown)  (unknown)  Pregnancy type::  (units  

  (unknown)



                    date)                         Other Normal unknown)  



                                                  Pregnancy           

 

           (unknown)  (no       (unknown)  (unknown)  Prenatal  (units    (unkno

wn)



                    date)                         Education unknown)  

 

           (unknown)  (no       (unknown)  (unknown)  Prenatal Initial  (units  

  (unknown)



                    date)                         Assessment unknown)  

 

           (unknown)  (no       (unknown)  (unknown)  Prenatal  (units    (unkno

wn)



                    date)                         Specific  unknown)  



                                                  Issues/Plans           

 

           (unknown)  (no       (unknown)  (unknown)  Prenatal  (units    (unkno

wn)



                    date)                         Testing:  unknown)  



                                                  discussed           

 

           (unknown)  (no       (unknown)  (unknown)  Prenatal Visit  (units    

(unknown)



                    date)                                   unknown)  

 

           (unknown)  (no       (unknown)  (unknown)  Prenatal  (units    (unkno

wn)



                    date)                         education packet: unknown)  



                                                  Child birth           



                                                  education/plan,           



                                                  Pregnancy           



                                                  symptoms,           

 

           (unknown)  (no       (unknown)  (unknown)  Primary Care  (units    (u

nknown)



                    date)                         Provider: BASIM Escobar unknown)  



                                                  [facility]              

 

           (unknown)  (no       (unknown)  (unknown)  Primary Ob  (units    (unk

nown)



                    date)                         Provider: unknown)  



                                                  Julieta Au           

 

           (unknown)  (no       (unknown)  (unknown)  Prior     (units    (unkno

wn)



                    date)                         GBS-Infected unknown)  



                                                  child: No           

 

           (unknown)  (no       (unknown)  (unknown)  Providers  (units    (unkn

own)



                    date)                                   unknown)  

 

           (unknown)  (no       (unknown)  (unknown)  Pt presents for  (units   

 (unknown)



                    date)                         a PNV at 16+6 unknown)  



                                                  wks. No FM. No           



                                                  LOF/VB. Rec'd flu           



                                                  shot                

 

           (unknown)  (no       (unknown)  (unknown)  Pt presents for  (units   

 (unknown)



                    date)                         a routine PNV at unknown)  



                                                  28 +6 wks           



                                                  gestation.           



                                                  Abnormal 3 hr           



                                                  GTT.                

 

           (unknown)  (no       (unknown)  (unknown)  Rash or viral  (units    (

unknown)



                    date)                         illness since unknown)  



                                                  last menstrual           



                                                  period: No           

 

           (unknown)  (no       (unknown)  (unknown)  Rash      (units    (unkno

wn)



                    date)                                   unknown)  

 

           (unknown)  (no       (unknown)  (unknown)  Reason For Visit  (units  

  (unknown)



                    date)                                   unknown)  

 

           (unknown)  (no       (unknown)  (unknown)  Recent travel  (units    (

unknown)



                    date)                         outside of unknown)  



                                                  country?: No           

 

           (unknown)  (no       (unknown)  (unknown)  Recurrent  (units    (unkn

own)



                    date)                         pregnancy loss or unknown)  



                                                  a stillbirth: Yes           

 

           (unknown)  (no       (unknown)  (unknown)  Reports over the  (units  

  (unknown)



                    date)                         counter   unknown)  



                                                  medications           



                                                  (diclofenac),           



                                                  Denies alcohol,           



                                                  Denies              

 

           (unknown)  (no       (unknown)  (unknown)  Safety    (units    (unkno

wn)



                    date)                                   unknown)  

 

           (unknown)  (no       (unknown)  (unknown)  Second Trimester  (units  

  (unknown)



                    date)                         Education unknown)  



                                                  Checklist           

 

           (unknown)  (no       (unknown)  (unknown)  Selecting a  (units    (un

known)



                    date)                          care unknown)  



                                                  provider: further           



                                                  discussion needed           

 

           (unknown)  (no       (unknown)  (unknown)  She is at 22  (units    (u

nknown)



                    date)                         weeks 6 days. She unknown)  



                                                  has felt good           



                                                  fetal movement.           



                                                  She says it is           

 

           (unknown)  (no       (unknown)  (unknown)  Shingles  (units    (unkno

wn)



                    date)                                   unknown)  

 

           (unknown)  (no       (unknown)  (unknown)  Signed By:  (units    (unk

nown)



                    date)                                   unknown)  

 

           (unknown)  (no       (unknown)  (unknown)  Signs and  (units    (unkn

own)



                    date)                         symptoms of unknown)  



                                                   labor:           



                                                  discussed           

 

           (unknown)  (no       (unknown)  (unknown)  Smoking Status:  (units   

 (unknown)



                    date)                         Never smoker unknown)  

 

           (unknown)  (no       (unknown)  (unknown)  Social History  (units    

(unknown)



                    date)                                   unknown)  

 

           (unknown)  (no       (unknown)  (unknown)  Support   (units    (unkno

wn)



                    date)                         Person(s):: Franklin unknown)  

 

           (unknown)  (no       (unknown)  (unknown)  Surgical History  (units  

  (unknown)



                    date)                         (Updated 09/10/22 unknown)  



                                                  @ 21:46 by Fatoumata Flores)             

 

           (unknown)  (no       (unknown)  (unknown)  Surrogate  (units    (unkn

own)



                    date)                         pregnancy?: no unknown)  

 

           (unknown)  (no       (unknown)  (unknown)  Symptoms since  (units    

(unknown)



                    date)                         LMP: Reports unknown)  



                                                  amenorrhea,           



                                                  nausea, fatigue,           



                                                  breast              



                                                  tenderness,           

 

           (unknown)  (no       (unknown)  (unknown)  Teratogen  (units    (unkn

own)



                    date)                         Exposures since unknown)  



                                                  LMP/Conception:           



                                                  Denies              



                                                  prescription           



                                                  medications,           

 

           (unknown)  (no       (unknown)  (unknown)  Testing   (units    (unkno

wn)



                    date)                         Education unknown)  

 

           (unknown)  (no       (unknown)  (unknown)  Testing   (units    (unkno

wn)



                    date)                         education unknown)  



                                                  completed: group           



                                                  B strep, Spina           



                                                  bifida testing           



                                                  and Cell Free           

 

           (unknown)  (no       (unknown)  (unknown)  This note may  (units    (

unknown)



                    date)                         have been all or unknown)  



                                                  partially           



                                                  generated using           



                                                  voice recognition           

 

           (unknown)  (no       (unknown)  (unknown)  Tobacco +  (units    (unkn

own)



                    date)                         Substance Use unknown)  

 

           (unknown)  (no       (unknown)  (unknown)  Tobacco Status  (units    

(unknown)



                    date)                                   unknown)  

 

           (unknown)  (no       (unknown)  (unknown)  Trimester:: 3rd  (units   

 (unknown)



                    date)                         Trimester unknown)  



                                                  (28wks-Del)           

 

           (unknown)  (no       (unknown)  (unknown)  Tumor (-10/2012)  (units  

  (unknown)



                    date)                                   unknown)  

 

           (unknown)  (no       (unknown)  (unknown)  Type(s) of  (units    (unk

nown)



                    date)                         exercise: unknown)  



                                                  bicycling           



                                                  (stationary           



                                                  bike), regular           



                                                  exercise, weight           

 

           (unknown)  (no       (unknown)  (unknown) UProtein Movement  (units  

  (unknown)



                    date)                         PreLabor FHR Fndl unknown)  



                                                  Ht Pres Edema           



                                                  Cerv Exam           



                                                  US/Comment Next           



                                                  Appt                

 

           (unknown)  (no       (unknown)  (unknown)  Ultrasound  (units    (unk

nown)



                    date)                         performed?: Yes unknown)  

 

           (unknown)  (no       (unknown)  (unknown)  Varicella/chicke  (units  

  (unknown)



                    date)                         n pox status: unknown)  



                                                  previous disease           

 

           (unknown)  (no       (unknown)  (unknown)  Visit Date:  (units    (un

known)



                    date)                         23 Last unknown)  



                                                  Updated by:           



                                                  Julieta Au MD                  

 

           (unknown)  (no       (unknown)  (unknown)  Visit Date:  (units    (un

known)



                    date)                         23 Last unknown)  



                                                  Updated by:           



                                                  Julieta Au MD                  

 

           (unknown)  (no       (unknown)  (unknown)  Visit Date:  (units    (un

known)



                    date)                         23 Last unknown)  



                                                  Updated by:           



                                                  Julieta Au MD                  

 

           (unknown)  (no       (unknown)  (unknown)  Visit Date:  (units    (un

known)



                    date)                         22 Last unknown)  



                                                  Updated by:           



                                                  Julieta Au MD                  

 

           (unknown)  (no       (unknown)  (unknown)  Visit Date:  (units    (un

known)



                    date)                         10/11/22 Last unknown)  



                                                  Updated by:           



                                                  Fanny Baker MD                  

 

           (unknown)  (no       (unknown)  (unknown)  Visit Date:  (units    (un

known)



                    date)                         22 Last unknown)  



                                                  Updated by:           



                                                  Julieta Au MD                  

 

           (unknown)  (no       (unknown)  (unknown)  Visit Date:  (units    (un

known)



                    date)                         22 Last unknown)  



                                                  Updated by: Berta Salinas P.A-C           

 

           (unknown)  (no       (unknown)  (unknown)  Visit Reasons:  (units    

(unknown)



                    date)                         OB w/TIFFANIE * Roe unknown)  

 

           (unknown)  (no       (unknown)  (unknown)  Vitals    (units    (unkno

wn)



                    date)                                   unknown)  

 

           (unknown)  (no       (unknown)  (unknown)  Vitamins and  (units    (u

nknown)



                    date)                         iron, Diet and unknown)  



                                                  weight gain, Fish           



                                                  and mercury           



                                                  intake, Caffeine           



                                                  use,                

 

           (unknown)  (no       (unknown)  (unknown)  WG        (units    (unkno

wn)



                    date)                                   unknown)  

 

           (unknown)  (no       (unknown)  (unknown)  Weeks     (units    (unkno

wn)



                    date)                         gestation:: 35 unknown)  

 

           (unknown)  (no       (unknown)  (unknown)  Weight 181 lb  (units    (

unknown)



                    date)                                   unknown)  

 

           (unknown)  (no       (unknown)  (unknown)  [place] teeth  (units    (u

nknown)



                    date)                         extracted unknown)  

 

           (unknown)  (no       (unknown)  (unknown)  Zika virus  (units    (unk

nown)



                    date)                         exposure: No unknown)  

 

           (unknown)  (no       (unknown)  (unknown)  accompanied by  (units    

(unknown)



                    date)                         her . She unknown)  



                                                  went through           



                                                  fertility           



                                                  treatments with           

 

           (unknown)  (no       (unknown)  (unknown)  alcohol intake:  (units   

 (unknown)



                    date)                         never     unknown)  

 

           (unknown)  (no       (unknown)  (unknown)  anterior  (units    (unkno

wn)



                    date)                         placenta. Routine unknown)  



                                                  precautions           



                                                  reviewed with the           



                                                  patient. Due to           



                                                  1st                 

 

           (unknown)  (no       (unknown)  (unknown)  anyone in either  (units  

  (unknown)



                    date)                         family with: unknown)  

 

           (unknown)  (no       (unknown)  (unknown)  baby's father  (units    (

unknown)



                    date)                         had a child with unknown)  



                                                  birth defects not           



                                                  listed above and           



                                                  Denies Other           

 

           (unknown)  (no       (unknown)  (unknown)  back pain.  (units    (unk

nown)



                    date)                         Advised   unknown)  



                                                  stretching, belly           



                                                  band, and PT if           



                                                  not improving.           



                                                  AFP was             

 

           (unknown)  (no       (unknown)  (unknown)  blood sugar  (units    (un

known)



                    date)                         diagnostic (Blood unknown)  



                                                  Glucose Test           



                                                  strips) #100 ea           



                                                  23 [Rx           

 

           (unknown)  (no       (unknown)  (unknown)  blood-glucose  (units    (

unknown)



                    date)                         meter #1 ea unknown)  



                                                  23 [Rx           



                                                  Confirmed           



                                                  23]           

 

           (unknown)  (no       (unknown)  (unknown)  by ultrasound is  (units  

  (unknown)



                    date)                         normal with good unknown)  



                                                  fetal movement,           



                                                  normal appearing           



                                                  fluid,              

 

           (unknown)  (no       (unknown)  (unknown)  caffeine: Yes  (units    (

unknown)



                    date)                         (1-2 cups unknown)  



                                                  tea/day)            

 

           (unknown)  (no       (unknown)  (unknown)  carbon monox  (units    (u

nknown)



                    date)                         detector in home: unknown)  



                                                  Yes                 

 

           (unknown)  (no       (unknown)  (unknown)  cfDNA normal  (units    (u

nknown)



                    date)                         female, AFP unknown)  



                                                  negative            

 

           (unknown)  (no       (unknown)  (unknown)  consistent with  (units   

 (unknown)



                    date)                         9 weeks 0 days. unknown)  



                                                  Yolk sac visible.           



                                                  Fetal heart rate           



                                                  178 beats           

 

           (unknown)  (no       (unknown)  (unknown)  contractions.  (units    (

unknown)



                    date)                         Her fasting unknown)  



                                                  glucose has been           



                                                  in the            



                                                  range. She is           

 

           (unknown)  (no       (unknown)  (unknown)  current   (units    (unkno

wn)



                    date)                         occupational unknown)  



                                                  exposures/hazards           



                                                  : No                

 

           (unknown)  (no       (unknown)  (unknown)  currently on  (units    (u

nknown)



                    date)                         metformin 500 mg unknown)  



                                                  twice a day. She           



                                                  had a nonstress           



                                                  test today which           

 

           (unknown)  (no       (unknown)  (unknown)  daily servings  (units    

(unknown)



                    date)                         fruits/ve-4 unknown)  

 

           (unknown)  (no       (unknown)  (unknown)  described, visit  (units  

  (unknown)



                    date)                         schedule  unknown)  



                                                  reviewed,           



                                                  ultrasounds           



                                                  policy reviewed,           



                                                  coverage 24           

 

           (unknown)  (no       (unknown)  (unknown)  developing a  (units    (u

nknown)



                    date)                         pattern. No unknown)  



                                                  leakage of fluid           



                                                  or vaginal           



                                                  bleeding. No           



                                                  contractions           

 

           (unknown)  (no       (unknown)  (unknown)  discussed,  (units    (unk

nown)



                    date)                         tuberculosis unknown)  



                                                  exposure            



                                                  discussed, CMV           



                                                  discussed,           



                                                  Toxoplasmosis           

 

           (unknown)  (no       (unknown)  (unknown)  disorders,  (units    (unk

nown)



                    date)                         Denies Cystic unknown)  



                                                  Fibrosis, Denies           



                                                  Mental              



                                                  Retardation/Autis           



                                                  m, Denies           

 

           (unknown)  (no       (unknown)  (unknown)  do you feel safe  (units  

  (unknown)



                    date)                         at home: Yes unknown)  

 

           (unknown)  (no       (unknown)  (unknown)  during the past  (units   

 (unknown)



                    date)                         year weight has: unknown)  



                                                  remained stable           

 

           (unknown)  (no       (unknown)  (unknown)  education level:  (units  

  (unknown)



                    date)                         college   unknown)  



                                                  (Associate's           



                                                  degree)             

 

           (unknown)  (no       (unknown)  (unknown)  exposure,  (units    (unkn

own)



                    date)                         Medication use, unknown)  



                                                  Sauna/hot tub           



                                                  use, Dental care,           



                                                  Travel and           



                                                  Influenza           

 

           (unknown)  (no       (unknown)  (unknown)  fire      (units    (unkno

wn)



                    date)                         extinguisher in unknown)  



                                                  home: Yes           

 

           (unknown)  (no       (unknown)  (unknown)  firearms in  (units    (un

known)



                    date)                         home: Yes unknown)  



                                                  firearms unloaded           



                                                  and locked: Yes           

 

           (unknown)  (no       (unknown)  (unknown)  frequency: 5-6  (units    

(unknown)



                    date)                         times per week unknown)  

 

           (unknown)  (no       (unknown)  (unknown)  gestational sac  (units   

 (unknown)



                    date)                         with a fetus with unknown)  



                                                  a crown-rump           



                                                  length measuring           



                                                  2.29 cm             

 

           (unknown)  (no       (unknown)  (unknown)  have occurred.  (units    

(unknown)



                    date)                         If there are any unknown)  



                                                  questions, please           



                                                  contact the           



                                                  Medical Records           

 

           (unknown)  (no       (unknown)  (unknown)  hours a day and  (units   

 (unknown)



                    date)                         participation of unknown)  



                                                  father in           



                                                  prenatal care and           



                                                  office visits           

 

           (unknown)  (no       (unknown)  (unknown)  household  (units    (unkn

own)



                    date)                         members: spouse unknown)  



                                                  and family           



                                                  (father and           



                                                  father-in-law)           

 

           (unknown)  (no       (unknown)  (unknown)  housing: house  (units    

(unknown)



                    date)                                   unknown)  

 

           (unknown)  (no       (unknown)  (unknown)  illicit drugs  (units    (

unknown)



                    date)                         and Denies other unknown)  

 

           (unknown)  (no       (unknown)  (unknown)  kag       (units    (unkno

wn)



                    date)                                   unknown)  

 

           (unknown)  (no       (unknown)  (unknown)  lancets #100 ea  (units   

 (unknown)



                    date)                         23 [Rx unknown)  



                                                  Confirmed           



                                                  23]           

 

           (unknown)  (no       (unknown)  (unknown)  last visit.  (units    (un

known)



                    date)                         Plan: AFP today. unknown)  



                                                  20 wk u/s           



                                                  reviewed. F/U 4           



                                                  wks. Warning           



                                                  signs               

 

           (unknown)  (no       (unknown)  (unknown)  lifting and  (units    (un

known)



                    date)                         other (hiking) unknown)  

 

           (unknown)  (no       (unknown)  (unknown)  lives     (units    (unkno

wn)



                    date)                         independently: unknown)  



                                                  Yes                 

 

           (unknown)  (no       (unknown)  (unknown)  marital status:  (units   

 (unknown)



                    date)                            unknown)  

 

           (unknown)  (no       (unknown)  (unknown)  may occur.  (units    (unk

nown)



                    date)                         Occasional unknown)  



                                                  wrong-word or           



                                                  'sound-alike'           



                                                  substitutions may           



                                                  have                

 

           (unknown)  (no       (unknown)  (unknown)  medication only.  (units  

  (unknown)



                    date)                         Had 7     unknown)  



                                                  miscarriages. No           



                                                  bleeding with           



                                                  this pregnancy.           



                                                  This 1              

 

           (unknown)  (no       (unknown)  (unknown)  metformin 500 mg  (units  

  (unknown)



                    date)                         tablet 500 mg PO unknown)  



                                                  BID #60 tabs           



                                                  23 [Rx           



                                                  Confirmed           



                                                  23]           

 

           (unknown)  (no       (unknown)  (unknown)  metformin 500 mg  (units  

  (unknown)



                    date)                         tablet 750 mg PO unknown)  



                                                  BID #90 tabs           



                                                  23 [Rx           



                                                  Confirmed           



                                                  23]           

 

           (unknown)  (no       (unknown)  (unknown)  movement and  (units    (u

nknown)



                    date)                         denies VB, LOF, unknown)  



                                                  cramping and           



                                                  regular             



                                                  contractions. Pt           



                                                  reports low           

 

           (unknown)  (no       (unknown)  (unknown)  negative.  (units    (unkn

own)



                    date)                         Anatomy US unknown)  



                                                  scheduled for           



                                                  later today.           



                                                  Warning             



                                                  precautions           



                                                  reviewed.           

 

           (unknown)  (no       (unknown)  (unknown)  nickel Allergy  (units    

(unknown)



                    date)                         (Mild, Verified unknown)  



                                                  23 14:37)           

 

           (unknown)  (no       (unknown)  (unknown)  number of  (units    (unkn

own)



                    date)                         children: 0 unknown)  

 

           (unknown)  (no       (unknown)  (unknown)  occupational  (units    (u

nknown)



                    date)                         status: employed unknown)  



                                                  (active duty           



                                                  avionics            



                                                  ,           



                                                  [employer] job            

 

           (unknown)  (no       (unknown)  (unknown)  occurred due to  (units   

 (unknown)



                    date)                         the inherent unknown)  



                                                  limitations of           



                                                  voice recognition           



                                                  software. Please           

 

           (unknown)  (no       (unknown)  (unknown)  or cramping.  (units    (u

nknown)



                    date)                         Plan: 1 hour unknown)  



                                                  glucose ordered.           



                                                  Follow-up in 4           



                                                  weeks. Warning           

 

           (unknown)  (no       (unknown)  (unknown)  per minute.  (units    (un

known)



                    date)                         Normal ovaries unknown)  



                                                  bilaterally.           



                                                  Plan: Follow-up           



                                                  in 4 weeks.           



                                                  Warning             

 

           (unknown)  (no       (unknown)  (unknown)  pets and  (units    (unkno

wn)



                    date)                         animals: Yes (1 unknown)  



                                                  large dog,           



                                                  chickens)           

 

           (unknown)  (no       (unknown)  (unknown)  precautions,  (units    (u

nknown)



                    date)                         Listeriosis unknown)  



                                                  prevention and           



                                                  Rubella             



                                                  Immunization           

 

           (unknown)  (no       (unknown)  (unknown)  preeclampsia.  (units    (

unknown)



                    date)                                   unknown)  

 

           (unknown)  (no       (unknown)  (unknown)  pregnancy  (units    (unkn

own)



                    date)                         discussed unknown)  



                                                  starting baby           



                                                  aspirin per day           



                                                  to decrease her           



                                                  risk for            

 

           (unknown)  (no       (unknown)  (unknown)  prenat.vits,nan,  (units  

  (unknown)



                    date)                         min-iron-folic 1 unknown)  



                                                  tab PO DAILY           



                                                  22 [History           



                                                  Confirmed           

 

           (unknown)  (no       (unknown)  (unknown)  read the note  (units    (

unknown)



                    date)                         carefully and unknown)  



                                                  recognize, using           



                                                  context, where           



                                                  these               



                                                  substitutions           

 

           (unknown)  (no       (unknown)  (unknown)  reviewed.  (units    (unkn

own)



                    date)                                   unknown)  

 

           (unknown)  (no       (unknown)  (unknown)  seatbelt use:  (units    (

unknown)



                    date)                         always    unknown)  

 

           (unknown)  (no       (unknown)  (unknown)  second hand  (units    (un

known)



                    date)                         exposure: Yes unknown)  



                                                  (father-in-law           



                                                  smokes outside           



                                                  the house)           

 

           (unknown)  (no       (unknown)  (unknown)  signs for  (units    (unkn

own)



                    date)                          labor unknown)  



                                                  reviewed.           

 

           (unknown)  (no       (unknown)  (unknown)  signs reviewed.  (units   

 (unknown)



                    date)                         cfDNA ordered. unknown)  

 

           (unknown)  (no       (unknown)  (unknown)  signs reviewed.  (units   

 (unknown)



                    date)                                   unknown)  

 

           (unknown)  (no       (unknown)  (unknown)  software.  (units    (unkn

own)



                    date)                         Although every unknown)  



                                                  effort is made to           



                                                  edit content,           



                                                  transcription           



                                                  errors              

 

           (unknown)  (no       (unknown)  (unknown)  special madi  (units    (

unknown)



                    date)                         needs: No unknown)  

 

           (unknown)  (no       (unknown)  (unknown)  substance use  (units    (

unknown)



                    date)                         type: does not unknown)  



                                                  use                 

 

           (unknown)  (no       (unknown)  (unknown)  travel history:  (units   

 (unknown)



                    date)                         over 6 months ago unknown)  

 

           (unknown)  (no       (unknown)  (unknown)  urinary   (units    (unkno

wn)



                    date)                         frequency and unknown)  



                                                  irritability           

 

           (unknown)  (no       (unknown)  (unknown)  vaccine (Annual  (units   

 (unknown)



                    date)                         flu, Phizer Covid unknown)  



                                                  x2)                 

 

           (unknown)  (no       (unknown)  (unknown)  w0d 4 wks  (units    (unkn

own)



                    date)                                   unknown)  

 

           (unknown)  (no       (unknown)  (unknown)  was not using  (units    (

unknown)



                    date)                         any infertility unknown)  



                                                  medications.           



                                                  Ultrasound: An           



                                                  intrauterine           

 

           (unknown)  (no       (unknown)  (unknown)  was reactive. On  (units  

  (unknown)



                    date)                         ultrasound: unknown)  



                                                  Vertex              



                                                  presentation. AGA           



                                                  34 weeks 0 days.           



                                                  5 lb                

 

           (unknown)  (no       (unknown)  (unknown)  water heater  (units    (u

nknown)



                    date)                         temp set < 120 unknown)  



                                                  deg: Yes            

 

           (unknown)  (no       (unknown)  (unknown)  well-balanced  (units    (

unknown)



                    date)                         diet: daily or unknown)  



                                                  most days           

 

           (unknown)  (no       (unknown)  (unknown)  went away with  (units    

(unknown)



                    date)                         laying down. No unknown)  



                                                  vaginal bleeding.           



                                                  No fevers. Fetal           



                                                  heart tone           

 

           (unknown)  (no       (unknown)  (unknown)  while pregnant)  (units   

 (unknown)



                    date)                                   unknown)  

 

           (unknown)  (no       (unknown)  (unknown)  will follow-up  (units    

(unknown)



                    date)                         in 2 weeks with unknown)  



                                                  Dr. Baekr for an           



                                                  TIFFANIE/nonstress           



                                                  test. Warning           

 

           (unknown)  (no       (unknown)  (unknown)  wks. Warning  (units    (u

nknown)



                    date)                         signs for PTL unknown)  



                                                  reviewed.           

 

           (unknown)  (no       (unknown)  (unknown)  working smoke  (units    (

unknown)



                    date)                         detector in home: unknown)  



                                                  Yes                 









                                         Result panel 29









           (unknown)  (no       (unknown)  (unknown)  (no value)  (units    (unk

nown)



                    date)                                   unknown)  

 

           (unknown)  (no       (unknown)  (unknown)  (+12 lb) 120/58  (units   

 (unknown)



                    date)                         N         unknown)  

 

           (unknown)  (no       (unknown)  (unknown)  (+13 lb) 104/54  (units   

 (unknown)



                    date)                         N         unknown)  

 

           (unknown)  (no       (unknown)  (unknown)  (+18 lb) 122/60  (units   

 (unknown)



                    date)                         N         unknown)  

 

           (unknown)  (no       (unknown)  (unknown)  (+22 lb) 110/62  (units   

 (unknown)



                    date)                         N         unknown)  

 

           (unknown)  (no       (unknown)  (unknown)  (+23 lb) 116/62  (units   

 (unknown)



                    date)                         N         unknown)  

 

           (unknown)  (no       (unknown)  (unknown)  (+24 lb) 128/62  (units   

 (unknown)



                    date)                         N         unknown)  

 

           (unknown)  (no       (unknown)  (unknown)  (+26 lb) 110/60  (units   

 (unknown)



                    date)                                   unknown)  

 

           (unknown)  (no       (unknown)  (unknown)  (+7 lb) 128/66 N  (units  

  (unknown)



                    date)                                   unknown)  

 

           (unknown)  (no       (unknown)  (unknown)  (+8 lb) 122/68 N  (units  

  (unknown)



                    date)                                   unknown)  

 

           (unknown)  (no       (unknown)  (unknown)  (1) 35 weeks  (units    (u

nknown)



                    date)                         gestation of unknown)  



                                                  pregnancy:           

 

           (unknown)  (no       (unknown)  (unknown)  **Genetic  (units    (unkn

own)



                    date)                         Screening/Teratol unknown)  



                                                  ogy Counseling -           



                                                  Includes patient,           



                                                  baby's father, or           

 

           (unknown)  (no       (unknown)  (unknown)  -?-?-?-?-?-?-?-?  (units  

  (unknown)



                    date)                         -?-?-?-?  unknown)  

 

           (unknown)  (no       (unknown)  (unknown)  23  (units    (unkno

wn)



                    date)                                   unknown)  

 

           (unknown)  (no       (unknown)  (unknown)  23  (units    (unkno

wn)



                    date)                                   unknown)  

 

           (unknown)  (no       (unknown)  (unknown)  23  (units    (unkno

wn)



                    date)                                   unknown)  

 

           (unknown)  (no       (unknown)  (unknown)  23  (units    (unkno

wn)



                    date)                                   unknown)  

 

           (unknown)  (no       (unknown)  (unknown)  23 1457  (units    (

unknown)



                    date)                                   unknown)  

 

           (unknown)  (no       (unknown)  (unknown)  23  (units    (unkno

wn)



                    date)                                   unknown)  

 

           (unknown)  (no       (unknown)  (unknown)  23]  (units    (unkn

own)



                    date)                                   unknown)  

 

           (unknown)  (no       (unknown)  (unknown)  04/15/23  (units    (unkno

wn)



                    date)                         Ultrasound #1 36w unknown)  



                                                  2d                  

 

           (unknown)  (no       (unknown)  (unknown)  [id number]   (units    (unkno

wn)



                    date)                                   unknown)  

 

           (unknown)  (no       (unknown)  (unknown)  22  (units    (unkno

wn)



                    date)                                   unknown)  

 

           (unknown)  (no       (unknown)  (unknown)  10/11/22  (units    (unkno

wn)



                    date)                                   unknown)  

 

           (unknown)  (no       (unknown)  (unknown)  22  (units    (unkno

wn)



                    date)                                   unknown)  

 

           (unknown)  (no       (unknown)  (unknown)  22  (units    (unkno

wn)



                    date)                                   unknown)  

 

           (unknown)  (no       (unknown)  (unknown)  12w 6d 163 lb  (units    (

unknown)



                    date)                                   unknown)  

 

           (unknown)  (no       (unknown)  (unknown)  14:37     (units    (unkno

wn)



                    date)                                   unknown)  

 

           (unknown)  (no       (unknown)  (unknown)  16w 6d 167 lb  (units    (

unknown)



                    date)                                   unknown)  

 

           (unknown)  (no       (unknown)  (unknown)  20w 5d 168 lb  (units    (

unknown)



                    date)                                   unknown)  

 

           (unknown)  (no       (unknown)  (unknown)  24w 6d 173 lb  (units    (

unknown)



                    date)                                   unknown)  

 

           (unknown)  (no       (unknown)  (unknown)  28w 6d 177 lb  (units    (

unknown)



                    date)                                   unknown)  

 

           (unknown)  (no       (unknown)  (unknown)  3 oz. 57%ile.  (units    (

unknown)



                    date)                         TIFFANIE 15.87 cm. unknown)  



                                                  Grade 0 placenta.           



                                                  Plan: Increase           



                                                  metformin to           

 

           (unknown)  (no       (unknown)  (unknown)  3 wks     (units    (unkno

wn)



                    date)                                   unknown)  

 

           (unknown)  (no       (unknown)  (unknown)  31w 6d 179 lb  (units    (

unknown)



                    date)                                   unknown)  

 

           (unknown)  (no       (unknown)  (unknown)  33w 5d 178 lb  (units    (

unknown)



                    date)                                   unknown)  

 

           (unknown)  (no       (unknown)  (unknown)  35w 5d 181 lb  (units    (

unknown)



                    date)                                   unknown)  

 

           (unknown)  (no       (unknown)  (unknown)  4 wk      (units    (unkno

wn)



                    date)                                   unknown)  

 

           (unknown)  (no       (unknown)  (unknown)  750 mg twice a  (units    

(unknown)



                    date)                         day. She will unknown)  



                                                  follow-up in 1           



                                                  week for a           



                                                  nonstress test.           



                                                  She                 

 

           (unknown)  (no       (unknown)  (unknown)  8w 6d 162 lb  (units    (u

nknown)



                    date)                                   unknown)  

 

           (unknown)  (no       (unknown)  (unknown)  Abnormal lab  (units    (u

nknown)



                    date)                         values 1st unknown)  



                                                  trimester:           



                                                  discussed           

 

           (unknown)  (no       (unknown)  (unknown)  Abnormal lab  (units    (u

nknown)



                    date)                         values 2nd unknown)  



                                                  trimester:           



                                                  discussed           

 

           (unknown)  (no       (unknown)  (unknown)  Add'l Plan  (units    (unk

nown)



                    date)                         Details   unknown)  

 

           (unknown)  (no       (unknown)  (unknown)  Age/Sex: 28 / F  (units   

 (unknown)



                    date)                         Date of Service: unknown)  

 

           (unknown)  (no       (unknown)  (unknown)  Allergies  (units    (unkn

own)



                    date)                         ()   unknown)  

 

           (unknown)  (no       (unknown)  (unknown)  Allergies  (units    (unkn

own)



                    date)                                   unknown)  

 

           (unknown)  (no       (unknown)  (unknown)  [place]  (units    (

unknown)



                    date)                         [address]     unknown)  

 

           (unknown)  (no       (unknown)  (unknown)  Anesthesia  (units    (unk

nown)



                    date)                                   unknown)  

 

           (unknown)  (no       (unknown)  (unknown)  Aneuploidy  (units    (unk

nown)



                    date)                         Screening unknown)  



                                                  Offered: Accepted           



                                                  (wants CFDNA)           

 

           (unknown)  (no       (unknown)  (unknown)  Anticipated  (units    (un

known)



                    date)                         course of unknown)  



                                                  prenatal care:           



                                                  discussed           

 

           (unknown)  (no       (unknown)  (unknown)  Anxiety (-2016)  (units   

 (unknown)



                    date)                                   unknown)  

 

           (unknown)  (no       (unknown)  (unknown)  Arrhythmia  (units    (unk

nown)



                    date)                                   unknown)  

 

           (unknown)  (no       (unknown)  (unknown)  Assessment and  (units    

(unknown)



                    date)                         Plan      unknown)  

 

           (unknown)  (no       (unknown)  (unknown)  Attending Dr:  (units    (

unknown)



                    date)                         Fanny Baker MD unknown)  

 

           (unknown)  (no       (unknown)  (unknown)  Libra presents  (units   

 (unknown)



                    date)                         today for routine unknown)  



                                                  OB f/u at 20w5d.           



                                                  She reports good           



                                                  fetal               

 

           (unknown)  (no       (unknown)  (unknown)  BMI 29.2  (units    (unkno

wn)



                    date)                                   unknown)  

 

           (unknown)  (no       (unknown)  (unknown)  /60  (units    (unkn

own)



                    date)                                   unknown)  

 

           (unknown)  (no       (unknown)  (unknown)  Birth     (units    (unkno

wn)



                    date)                         Plan/Preferences unknown)  

 

           (unknown)  (no       (unknown)  (unknown)  Birth Planning  (units    

(unknown)



                    date)                                   unknown)  

 

           (unknown)  (no       (unknown)  (unknown)  Blood Pressure  (units    

(unknown)



                    date)                         Location Lt unknown)  



                                                  brachial            

 

           (unknown)  (no       (unknown)  (unknown)  Blood     (units    (unkno

wn)



                    date)                         transfusions?: unknown)  



                                                  yes (Never had           



                                                  but would accept)           

 

           (unknown)  (no       (unknown)  (unknown)  Breastfeeding:  (units    

(unknown)



                    date)                         discussed unknown)  

 

           (unknown)  (no       (unknown)  (unknown)  Chicken pox  (units    (un

known)



                    date)                         ()   unknown)  

 

           (unknown)  (no       (unknown)  (unknown)  Childbirth  (units    (unk

nown)



                    date)                         Classes:  unknown)  



                                                  discussed           

 

           (unknown)  (no       (unknown)  (unknown)  Conceived  (units    (unkn

own)



                    date)                         naturally this unknown)  



                                                  pregnancy           

 

           (unknown)  (no       (unknown)  (unknown)  Confirmed  (units    (unkn

own)



                    date)                         23] unknown)  

 

           (unknown)  (no       (unknown)  (unknown)  Current Estimate  (units  

  (unknown)



                    date)                         23  unknown)  



                                                  Ultrasound #2 35w           



                                                  5d                  

 

           (unknown)  (no       (unknown)  (unknown)  Current   (units    (unkno

wn)



                    date)                         Pregnancy History unknown)  

 

           (unknown)  (no       (unknown)  (unknown)  DNA       (units    (unkno

wn)



                    date)                                   unknown)  

 

           (unknown)  (no       (unknown)  (unknown)  : 1995  (units   

 (unknown)



                    date)                         Acct:[id number] unknown)  

 

           (unknown)  (no       (unknown)  (unknown)  Date of positive  (units  

  (unknown)



                    date)                         home pregnancy unknown)  



                                                  test: 08/15/22           

 

           (unknown)  (no       (unknown)  (unknown)  Date      (units    (unkno

wn)



                    date)                                   unknown)  

 

           (unknown)  (no       (unknown)  (unknown) Denies Congenital  (units  

  (unknown)



                    date)                         Heart Defect, unknown)  



                                                  Denies Down           



                                                  Syndrome, Denies           



                                                  Muscular            



                                                  Dystrophy,           

 

           (unknown)  (no       (unknown)  (unknown)  Denies Maternal  (units   

 (unknown)



                    date)                         Metabolic unknown)  



                                                  Disorder (EG,TYPE           



                                                  1 Diabetes, PKU),           



                                                  Denies Patient or           

 

           (unknown)  (no       (unknown)  (unknown)  Denies Neural  (units    (

unknown)



                    date)                         Tube Defect unknown)  



                                                  (Meningomyelocele           



                                                  , Spina Bifida,           



                                                  or Anencephaly),           

 

           (unknown)  (no       (unknown)  (unknown)  Denies Sickle  (units    (

unknown)



                    date)                         Cell Disease or unknown)  



                                                  Trait (),           



                                                  Denies Hemophilia           



                                                  or other blood           

 

           (unknown)  (no       (unknown)  (unknown)  Denies [place]achs  (units  

  (unknown)



                    date)                         (Ashkenazi unknown)  



                                                  [employer], [place],           



                                                  [place]),           



                                                  Denies [place]           

 

           (unknown)  (no       (unknown)  (unknown)  Depression  (units    (unk

nown)



                    date)                         (-2016)   unknown)  

 

           (unknown)  (no       (unknown)  (unknown)  Depression:  (units    (un

known)



                    date)                         discussed unknown)  

 

           (unknown)  (no       (unknown)  (unknown)  Dept at   (units    (unkno

wn)



                    date)                         [phone]. unknown)  

 

           (unknown)  (no       (unknown)  (unknown)  Diet and  (units    (unkno

wn)



                    date)                         Exercise  unknown)  

 

           (unknown)  (no       (unknown)  (unknown)  Disease   (units    (unkno

wn)



                    date)                         (Ashkenazi unknown)  



                                                  Advent), Denies           



                                                  Familial            



                                                  Dysautonomia           



                                                  (Ashkenazi           



                                                  Advent),            

 

           (unknown)  (no       (unknown)  (unknown)  Documented By:  (units    

(unknown)



                    date)                         Fanny Baker MD unknown)  



                                                  23 1437           

 

           (unknown)  (no       (unknown)  (unknown)  MEHNAZ Calculator  (units    

(unknown)



                    date)                                   unknown)  

 

           (unknown)  (no       (unknown)  (unknown)  EGA Weight BP  (units    (

unknown)



                    date)                         UGlucose  unknown)  

 

           (unknown)  (no       (unknown)  (unknown)  Eczema (-2000)  (units    

(unknown)



                    date)                                   unknown)  

 

           (unknown)  (no       (unknown)  (unknown)  Estimated  (units    (unkn

own)



                    date)                         Delivery Date unknown)  



                                                  Method Current           

 

           (unknown)  (no       (unknown)  (unknown)  Exercise and  (units    (u

nknown)



                    date)                         activity, unknown)  



                                                  work/environmenta           



                                                  l/hazards, Sexual           



                                                  activity, X-ray           

 

           (unknown)  (no       (unknown)  (unknown)  F/u in 4 wks.  (units    (

unknown)



                    date)                                   unknown)  

 

           (unknown)  (no       (unknown)  (unknown)  Family History  (units    

(unknown)



                    date)                         (Updated 09/10/22 unknown)  



                                                  @ 21:49 by Fatoumata Flores)             

 

           (unknown)  (no       (unknown)  (unknown)  Family/Other  (units    (u

nknown)



                    date)                         Multiple  unknown)  



                                                  sclerosis           

 

           (unknown)  (no       (unknown)  (unknown)  Father    (units    (unkno

wn)



                    date)                         Hypertension unknown)  

 

           (unknown)  (no       (unknown)  (unknown)  Father of Baby:  (units   

 (unknown)



                    date)                         same      unknown)  

 

           (unknown)  (no       (unknown)  (unknown)  [facility]  (units   

 (unknown)



                    date)                         [facility] unknown)  

 

           (unknown)  (no       (unknown)  (unknown)  First Trimester  (units   

 (unknown)



                    date)                         Education unknown)  



                                                  Checklist           

 

           (unknown)  (no       (unknown)  (unknown)  Foot pain  (units    (unkn

own)



                    date)                         ()   unknown)  

 

           (unknown)  (no       (unknown)  (unknown)   (SAB  (units    (unkn

own)



                    date)                         x7),Fertility Tx, unknown)  



                                                  meds only,           



                                                  started on baby           



                                                  aspirin             



                                                  10/11/2022           

 

           (unknown)  (no       (unknown)  (unknown)  GDM, on   (units    (unkno

wn)



                    date)                         Metformin 500mg unknown)  



                                                  BID, increased to           



                                                  750mg BID 3/6/23           

 

           (unknown)  (no       (unknown)  (unknown)  Genetic   (units    (unkno

wn)



                    date)                         Screening + unknown)  



                                                  Counseling           

 

           (unknown)  (no       (unknown)  (unknown)  Genetic   (units    (unkno

wn)



                    date)                         Screening unknown)  

 

           (unknown)  (no       (unknown)  (unknown)  Good fetal  (units    (unk

nown)



                    date)                         movement. No unknown)  



                                                  leakage of fluid           



                                                  or vaginal           



                                                  bleeding. No           



                                                  regular             

 

           (unknown)  (no       (unknown)  (unknown)  Grandfather  (units    (un

known)



                    date)                          Cancer unknown)  

 

           (unknown)  (no       (unknown)  (unknown)  Grandfather  (units    (un

known)



                    date)                          Stroke unknown)  

 

           (unknown)  (no       (unknown)  (unknown)  Grandmother  (units    (un

known)



                    date)                          History unknown)  



                                                  of heart disease           

 

           (unknown)  (no       (unknown)  (unknown)  Grandmother  (units    (un

known)



                    date)                          Multiple unknown)  



                                                  sclerosis           

 

           (unknown)  (no       (unknown)  (unknown)   8  (units    (unkn

own)



                    date)                         Multiple births 0 unknown)  

 

           (unknown)  (no       (unknown)  (unknown)  HIV risk  (units    (unkno

wn)



                    date)                         evaluation: low unknown)  



                                                  risk                

 

           (unknown)  (no       (unknown)  (unknown)  [facility]  (units    (

unknown)



                    date)                         Education unknown)  

 

           (unknown)  (no       (unknown)  (unknown)  Health center  (units    (

unknown)



                    date)                         information: unknown)  



                                                  nature of           



                                                  practice            



                                                  discussed,           



                                                  prenatal            



                                                  personnel           

 

           (unknown)  (no       (unknown)  (unknown)  Height 5 ft 6 in  (units  

  (unknown)



                    date)                                   unknown)  

 

           (unknown)  (no       (unknown)  (unknown)  Hepatitis C risk  (units  

  (unknown)



                    date)                         evaluation: low unknown)  



                                                  risk                

 

           (unknown)  (no       (unknown)  (unknown)  History of  (units    (unk

nown)



                    date)                         Hepatitis B: No unknown)  

 

           (unknown)  (no       (unknown)  (unknown)  History of  (units    (unk

nown)



                    date)                         Hepatitis C: No unknown)  

 

           (unknown)  (no       (unknown)  (unknown)  History of  (units    (unk

nown)



                    date)                         recurrent unknown)  



                                                  miscarriages           

 

           (unknown)  (no       (unknown)  (unknown)  [facility]  (units    (u

nknown)



                    date)                                   unknown)  

 

           (unknown)  (no       (unknown)  (unknown)  April's  (units    (u

nknown)



                    date)                         Chorea, Denies unknown)  



                                                  Other inherited           



                                                  genetic or           



                                                  chromosomal           



                                                  disorder,           

 

           (unknown)  (no       (unknown)  (unknown)  , [place]  (units    (

unknown)



                    date)                         [place]  unknown)  

 

           (unknown)  (no       (unknown)  (unknown)  Hx #   (units    (u

nknown)



                    date)                         Pregnancies 0 unknown)  



                                                  Elective            



                                                  abortions 0           

 

           (unknown)  (no       (unknown)  (unknown)  Hx # Term  (units    (unkn

own)



                    date)                         Pregnancies 0 unknown)  



                                                  Ectopic             



                                                  pregnancies 0           

 

           (unknown)  (no       (unknown)  (unknown)  Infant will be  (units    

(unknown)



                    date)                         adopted?: no unknown)  

 

           (unknown)  (no       (unknown)  (unknown)  Infection  (units    (unkn

own)



                    date)                         History   unknown)  

 

           (unknown)  (no       (unknown)  (unknown)  Infectious  (units    (unk

nown)



                    date)                         Disease Education unknown)  

 

           (unknown)  (no       (unknown)  (unknown)  Infectious  (units    (unk

nown)



                    date)                         disease exposure: unknown)  



                                                  chicken pox           



                                                  immunity            



                                                  discussed,           



                                                  hepatitis risk           

 

           (unknown)  (no       (unknown)  (unknown)  Infertility  (units    (un

known)



                    date)                         ()   unknown)  

 

           (unknown)  (no       (unknown)  (unknown)  Initial Weight:  (units   

 (unknown)



                    date)                         155 lb    unknown)  

 

           (unknown)  (no       (unknown)  (unknown)  Initials  (units    (unkno

wn)



                    date)                                   unknown)  

 

           (unknown)  (no       (unknown)  (unknown)  Intake Clinical  (units   

 (unknown)



                    date)                         Staff     unknown)  

 

           (unknown)  (no       (unknown)  (unknown)  Intake Note:  (units    (u

nknown)



                    date)                                   unknown)  

 

           (unknown)  (no       (unknown)  (unknown)  Intake performed  (units  

  (unknown)



                    date)                         by:       unknown)  



                                                  Edelmira Montero           

 

           (unknown)  (no       (unknown)  (unknown)  Intake    (units    (unkno

wn)



                    date)                                   unknown)  

 

           (unknown)  (no       (unknown)  (unknown)  Irregular  (units    (unkn

own)



                    date)                         menstrual cycle unknown)  



                                                  ()             

 

           (unknown)  (no       (unknown)  (unknown)  LM        (units    (unkno

wn)



                    date)                                   unknown)  

 

           (unknown)  (no       (unknown)  (unknown)  Lipoma    (units    (unkno

wn)



                    date)                                   unknown)  

 

           (unknown)  (no       (unknown)  (unknown)  Live with  (units    (unkn

own)



                    date)                         someone with TB unknown)  



                                                  or exposed to TB:           



                                                  No                  

 

           (unknown)  (no       (unknown)  (unknown)  Loc: [facility]  (units    (unkno

wn)



                    date)                                   unknown)  

 

           (unknown)  (no       (unknown)  (unknown)  Marital status:  (units   

 (unknown)



                    date)                            unknown)  

 

           (unknown)  (no       (unknown)  (unknown)  Medical History  (units   

 (unknown)



                    date)                         (Updated 23 unknown)  



                                                  @ 13:53 by Fanny Baker MD)           

 

           (unknown)  (no       (unknown)  (unknown)  Medications  (units    (un

known)



                    date)                                   unknown)  

 

           (unknown)  (no       (unknown)  (unknown)  Mother Gastric  (units    

(unknown)



                    date)                         cancer    unknown)  

 

           (unknown)  (no       (unknown)  (unknown)  N No 142 13 N/A  (units   

 (unknown)



                    date)                         4wk       unknown)  

 

           (unknown)  (no       (unknown)  (unknown)  N No no 143 17  (units    

(unknown)



                    date)                         N/A absent AFP 4 unknown)  



                                                  wks                 

 

           (unknown)  (no       (unknown)  (unknown)  N No no 178 9  (units    (

unknown)



                    date)                         N/A absent unknown)  



                                                  long/closed AGA 9           

 

           (unknown)  (no       (unknown)  (unknown)  N Yes no 135 34  (units   

 (unknown)



                    date)                         Vertex absent 2 unknown)  



                                                  wks                 

 

           (unknown)  (no       (unknown)  (unknown)  N Yes no 141 24  (units   

 (unknown)



                    date)                         N/A absent 4 wks unknown)  

 

           (unknown)  (no       (unknown)  (unknown)  N Yes no 142 20  (units   

 (unknown)



                    date)                         N/A absent AFP unknown)  



                                                  negative            

 

           (unknown)  (no       (unknown)  (unknown)  N Yes no 144 29  (units   

 (unknown)



                    date)                         Vertex absent AGA unknown)  



                                                  29w5d               

 

           (unknown)  (no       (unknown)  (unknown)  N Yes no 150 32  (units   

 (unknown)



                    date)                         Vertex absent 2 unknown)  



                                                  wks                 

 

           (unknown)  (no       (unknown)  (unknown)  NF        (units    (unkno

wn)



                    date)                                   unknown)  

 

           (unknown)  (no       (unknown)  (unknown)  Notes     (units    (unkno

wn)



                    date)                                   unknown)  

 

           (unknown)  (no       (unknown)  (unknown)  Number of Living  (units  

  (unknown)



                    date)                         Children 0 unknown)  

 

           (unknown)  (no       (unknown)  (unknown)  Number of  (units    (unkn

own)



                    date)                         fetuses:: Single unknown)  

 

           (unknown)  (no       (unknown)  (unknown)  Nutrition and  (units    (

unknown)



                    date)                         weight gain unknown)  



                                                  counseling:           



                                                  special diet:           



                                                  discussed           

 

           (unknown)  (no       (unknown)  (unknown)  OB Office Visit  (units   

 (unknown)



                    date)                                   unknown)  

 

           (unknown)  (no       (unknown)  (unknown)  OB Visit Log  (units    (u

nknown)



                    date)                                   unknown)  

 

           (unknown)  (no       (unknown)  (unknown)  OB check  (units    (unkno

wn)



                    date)                                   unknown)  

 

           (unknown)  (no       (unknown)  (unknown)  On U/S: AGA  (units    (un

known)



                    date)                         29w5d. 3#4oz unknown)  



                                                  66%ile. Nl AFV.           



                                                  Plan: Metformin           



                                                  500mg BID. F/U 3           

 

           (unknown)  (no       (unknown)  (unknown)  On birth control  (units  

  (unknown)



                    date)                         at conception?: unknown)  



                                                  No                  

 

           (unknown)  (no       (unknown)  (unknown)  Other Estimates  (units   

 (unknown)



                    date)                         23 LMP unknown)  



                                                  (Certain) 37w 6d           

 

           (unknown)  (no       (unknown)  (unknown)  Ovarian cyst  (units    (u

nknown)



                    date)                         ()   unknown)  

 

           (unknown)  (no       (unknown)  (unknown)  PCOS (polycystic  (units  

  (unknown)



                    date)                         ovarian syndrome) unknown)  

 

           (unknown)  (no       (unknown)  (unknown)  PFSH      (units    (unkno

wn)



                    date)                                   unknown)  

 

           (unknown)  (no       (unknown)  (unknown)  Pap performed?:  (units   

 (unknown)



                    date)                         No        unknown)  

 

           (unknown)  (no       (unknown)  (unknown)  Para 0    (units    (unkno

wn)



                    date)                         Spontaneous unknown)  



                                                  abortions 7           

 

           (unknown)  (no       (unknown)  (unknown)  Partner history  (units   

 (unknown)



                    date)                         of STD: denies hx unknown)  

 

           (unknown)  (no       (unknown)  (unknown)  Partner history  (units   

 (unknown)



                    date)                         of genital unknown)  



                                                  herpes: No           

 

           (unknown)  (no       (unknown)  (unknown)  Partner: [place]  (units    (

unknown)



                    date)                         [place]  unknown)  

 

           (unknown)  (no       (unknown)  (unknown)  Patient comes in  (units  

  (unknown)



                    date)                         for follow-up OB unknown)  



                                                  visit. She had           



                                                  some pelvic pain           



                                                  that                

 

           (unknown)  (no       (unknown)  (unknown)  Patient comes in  (units  

  (unknown)



                    date)                         for routine unknown)  



                                                  prenatal visit.           



                                                  Good fetal           



                                                  movement. No           

 

           (unknown)  (no       (unknown)  (unknown)  Patient presents  (units  

  (unknown)



                    date)                         for a new OB unknown)  



                                                  visit at 8 weeks           



                                                  gestation. She is           

 

           (unknown)  (no       (unknown)  (unknown)  Patient presents  (units  

  (unknown)



                    date)                         for a routine unknown)  



                                                  prenatal visit at           



                                                  33+ 5 weeks'           



                                                  gestation.           

 

           (unknown)  (no       (unknown)  (unknown)  Patient presents  (units  

  (unknown)



                    date)                         for a routine unknown)  



                                                  prenatal visit,           



                                                  accompanied by           



                                                  her .           

 

           (unknown)  (no       (unknown)  (unknown)  Patient's age 33  (units  

  (unknown)



                    date)                         years or older as unknown)  



                                                  of estimated date           



                                                  of delivery: No           

 

           (unknown)  (no       (unknown)  (unknown)  Patient:  (units    (unkno

wn)



                    date)                         Marni Prietorey unknown)  



                                                  MR#: [id number]            

 

           (unknown)  (no       (unknown)  (unknown)  Pediatrician:  (units    (

unknown)



                    date)                         DOD [place] vs unknown)  



                                                  [facility]           



                                                  [facility]           



                                                  [place]             

 

           (unknown)  (no       (unknown)  (unknown)  Personal history  (units  

  (unknown)



                    date)                         of STD: denies hx unknown)  

 

           (unknown)  (no       (unknown)  (unknown)  Personal history  (units  

  (unknown)



                    date)                         of genital unknown)  



                                                  herpes: No           

 

           (unknown)  (no       (unknown)  (unknown)  Plantar   (units    (unkno

wn)



                    date)                         fasciitis unknown)  

 

           (unknown)  (no       (unknown)  (unknown)  Position Sitting  (units  

  (unknown)



                    date)                                   unknown)  

 

           (unknown)  (no       (unknown)  (unknown)  Postpartum  (units    (unk

nown)



                    date)                         family    unknown)  



                                                  planning/Tubal           



                                                  sterilization:           



                                                  further             



                                                  discussion needed           

 

           (unknown)  (no       (unknown)  (unknown)  Pregnancy  (units    (unkn

own)



                    date)                         History   unknown)  

 

           (unknown)  (no       (unknown)  (unknown)  Pregnancy type::  (units  

  (unknown)



                    date)                         Other Normal unknown)  



                                                  Pregnancy           

 

           (unknown)  (no       (unknown)  (unknown)  Prenatal  (units    (unkno

wn)



                    date)                         Education unknown)  

 

           (unknown)  (no       (unknown)  (unknown)  Prenatal Initial  (units  

  (unknown)



                    date)                         Assessment unknown)  

 

           (unknown)  (no       (unknown)  (unknown)  Prenatal  (units    (unkno

wn)



                    date)                         Specific  unknown)  



                                                  Issues/Plans           

 

           (unknown)  (no       (unknown)  (unknown)  Prenatal  (units    (unkno

wn)



                    date)                         Testing:  unknown)  



                                                  discussed           

 

           (unknown)  (no       (unknown)  (unknown)  Prenatal Visit  (units    

(unknown)



                    date)                                   unknown)  

 

           (unknown)  (no       (unknown)  (unknown)  Prenatal  (units    (unkno

wn)



                    date)                         education packet: unknown)  



                                                  Child birth           



                                                  education/plan,           



                                                  Pregnancy           



                                                  symptoms,           

 

           (unknown)  (no       (unknown)  (unknown)  Primary Care  (units    (u

nknown)



                    date)                         Provider: BASIM [facility] unknown)  



                                                  [place]              

 

           (unknown)  (no       (unknown)  (unknown)  Primary Ob  (units    (unk

nown)



                    date)                         Provider: unknown)  



                                                  Julieta Au           

 

           (unknown)  (no       (unknown)  (unknown)  Prior     (units    (unkno

wn)



                    date)                         GBS-Infected unknown)  



                                                  child: No           

 

           (unknown)  (no       (unknown)  (unknown)  Providers  (units    (unkn

own)



                    date)                                   unknown)  

 

           (unknown)  (no       (unknown)  (unknown)  Pt presents for  (units   

 (unknown)



                    date)                         a PNV at 16+6 unknown)  



                                                  wks. No FM. No           



                                                  LOF/VB. Rec'd flu           



                                                  shot                

 

           (unknown)  (no       (unknown)  (unknown)  Pt presents for  (units   

 (unknown)



                    date)                         a routine PNV at unknown)  



                                                  28 +6 wks           



                                                  gestation.           



                                                  Abnormal 3 hr           



                                                  GTT.                

 

           (unknown)  (no       (unknown)  (unknown)  Rash or viral  (units    (

unknown)



                    date)                         illness since unknown)  



                                                  last menstrual           



                                                  period: No           

 

           (unknown)  (no       (unknown)  (unknown)  Rash      (units    (unkno

wn)



                    date)                                   unknown)  

 

           (unknown)  (no       (unknown)  (unknown)  Reason For Visit  (units  

  (unknown)



                    date)                                   unknown)  

 

           (unknown)  (no       (unknown)  (unknown)  Recent travel  (units    (

unknown)



                    date)                         outside of unknown)  



                                                  country?: No           

 

           (unknown)  (no       (unknown)  (unknown)  Recurrent  (units    (unkn

own)



                    date)                         pregnancy loss or unknown)  



                                                  a stillbirth: Yes           

 

           (unknown)  (no       (unknown)  (unknown)  Reports over the  (units  

  (unknown)



                    date)                         counter   unknown)  



                                                  medications           



                                                  (diclofenac),           



                                                  Denies alcohol,           



                                                  Denies              

 

           (unknown)  (no       (unknown)  (unknown)  Safety    (units    (unkno

wn)



                    date)                                   unknown)  

 

           (unknown)  (no       (unknown)  (unknown)  Second Trimester  (units  

  (unknown)



                    date)                         Education unknown)  



                                                  Checklist           

 

           (unknown)  (no       (unknown)  (unknown)  Selecting a  (units    (un

known)



                    date)                          care unknown)  



                                                  provider: further           



                                                  discussion needed           

 

           (unknown)  (no       (unknown)  (unknown)  She is at 22  (units    (u

nknown)



                    date)                         weeks 6 days. She unknown)  



                                                  has felt good           



                                                  fetal movement.           



                                                  She says it is           

 

           (unknown)  (no       (unknown)  (unknown)  Shingles  (units    (unkno

wn)



                    date)                                   unknown)  

 

           (unknown)  (no       (unknown)  (unknown)  Signed By:  (units    (unk

nown)



                    date)                         <Electronically unknown)  



                                                  signed by Fanny Bkaer MD>           

 

           (unknown)  (no       (unknown)  (unknown)  Signed    (units    (unkno

wn)



                    date)                                   unknown)  

 

           (unknown)  (no       (unknown)  (unknown)  Signs and  (units    (unkn

own)



                    date)                         symptoms of unknown)  



                                                   labor:           



                                                  discussed           

 

           (unknown)  (no       (unknown)  (unknown)  Smoking Status:  (units   

 (unknown)



                    date)                         Never smoker unknown)  

 

           (unknown)  (no       (unknown)  (unknown)  Social History  (units    

(unknown)



                    date)                                   unknown)  

 

           (unknown)  (no       (unknown)  (unknown)  Status: Acute  (units    (

unknown)



                    date)                                   unknown)  

 

           (unknown)  (no       (unknown)  (unknown)  Support   (units    (unkno

wn)



                    date)                         Person(s):: [place] unknown)  

 

           (unknown)  (no       (unknown)  (unknown)  Surgical History  (units  

  (unknown)



                    date)                         (Updated 09/10/22 unknown)  



                                                  @ 21:46 by Fatoumata Flores)             

 

           (unknown)  (no       (unknown)  (unknown)  Surrogate  (units    (unkn

own)



                    date)                         pregnancy?: no unknown)  

 

           (unknown)  (no       (unknown)  (unknown)  Symptoms since  (units    

(unknown)



                    date)                         LMP: Reports unknown)  



                                                  amenorrhea,           



                                                  nausea, fatigue,           



                                                  breast              



                                                  tenderness,           

 

           (unknown)  (no       (unknown)  (unknown)  Teratogen  (units    (unkn

own)



                    date)                         Exposures since unknown)  



                                                  LMP/Conception:           



                                                  Denies              



                                                  prescription           



                                                  medications,           

 

           (unknown)  (no       (unknown)  (unknown)  Testing   (units    (unkno

wn)



                    date)                         Education unknown)  

 

           (unknown)  (no       (unknown)  (unknown)  Testing   (units    (unkno

wn)



                    date)                         education unknown)  



                                                  completed: group           



                                                  B strep, Spina           



                                                  bifida testing           



                                                  and Cell Free           

 

           (unknown)  (no       (unknown)  (unknown)  This note may  (units    (

unknown)



                    date)                         have been all or unknown)  



                                                  partially           



                                                  generated using           



                                                  voice recognition           

 

           (unknown)  (no       (unknown)  (unknown)  Tobacco +  (units    (unkn

own)



                    date)                         Substance Use unknown)  

 

           (unknown)  (no       (unknown)  (unknown)  Tobacco Status  (units    

(unknown)



                    date)                                   unknown)  

 

           (unknown)  (no       (unknown)  (unknown)  Trimester:: 3rd  (units   

 (unknown)



                    date)                         Trimester unknown)  



                                                  (28wks-Del)           

 

           (unknown)  (no       (unknown)  (unknown)  Tumor (-10/2012)  (units  

  (unknown)



                    date)                                   unknown)  

 

           (unknown)  (no       (unknown)  (unknown)  Type(s) of  (units    (unk

nown)



                    date)                         exercise: unknown)  



                                                  bicycling           



                                                  (stationary           



                                                  bike), regular           



                                                  exercise, weight           

 

           (unknown)  (no       (unknown)  (unknown) UProtein Movement  (units  

  (unknown)



                    date)                         PreLabor FHR Fndl unknown)  



                                                  Ht Pres Edema           



                                                  Cerv Exam           



                                                  US/Comment Next           



                                                  Appt                

 

           (unknown)  (no       (unknown)  (unknown)  Ultrasound  (units    (unk

nown)



                    date)                         performed?: Yes unknown)  

 

           (unknown)  (no       (unknown)  (unknown)  Varicella/chicke  (units  

  (unknown)



                    date)                         n pox status: unknown)  



                                                  previous disease           

 

           (unknown)  (no       (unknown)  (unknown)  Visit Date:  (units    (un

known)



                    date)                         23 Last unknown)  



                                                  Updated by:           



                                                  Julieta Au MD                  

 

           (unknown)  (no       (unknown)  (unknown)  Visit Date:  (units    (un

known)



                    date)                         23 Last unknown)  



                                                  Updated by:           



                                                  Julieta Au MD                  

 

           (unknown)  (no       (unknown)  (unknown)  Visit Date:  (units    (un

known)



                    date)                         23 Last unknown)  



                                                  Updated by:           



                                                  Julieta Au MD                  

 

           (unknown)  (no       (unknown)  (unknown)  Visit Date:  (units    (un

known)



                    date)                         23 Last unknown)  



                                                  Updated by:           



                                                  Fanny Baker MD                  

 

           (unknown)  (no       (unknown)  (unknown)  Visit Date:  (units    (un

known)



                    date)                         22 Last unknown)  



                                                  Updated by:           



                                                  Julieta Au MD                  

 

           (unknown)  (no       (unknown)  (unknown)  Visit Date:  (units    (un

known)



                    date)                         10/11/22 Last unknown)  



                                                  Updated by:           



                                                  Fanny Baker MD                  

 

           (unknown)  (no       (unknown)  (unknown)  Visit Date:  (units    (un

known)



                    date)                         22 Last unknown)  



                                                  Updated by:           



                                                  Julieta Au MD                  

 

           (unknown)  (no       (unknown)  (unknown)  Visit Date:  (units    (un

known)



                    date)                         22 Last unknown)  



                                                  Updated by: Berta Salinas P.A-C           

 

           (unknown)  (no       (unknown)  (unknown)  Visit Reasons:  (units    

(unknown)



                    date)                         OB w/TIFFANIE * Garde unknown)  

 

           (unknown)  (no       (unknown)  (unknown)  Vitals    (units    (unkno

wn)



                    date)                                   unknown)  

 

           (unknown)  (no       (unknown)  (unknown)  Vitamins and  (units    (u

nknown)



                    date)                         iron, Diet and unknown)  



                                                  weight gain, Fish           



                                                  and mercury           



                                                  intake, Caffeine           



                                                  use,                

 

           (unknown)  (no       (unknown)  (unknown)  WG        (units    (unkno

wn)



                    date)                                   unknown)  

 

           (unknown)  (no       (unknown)  (unknown)  Weeks     (units    (unkno

wn)



                    date)                         gestation:: 35 unknown)  

 

           (unknown)  (no       (unknown)  (unknown)  Weight 181 lb  (units    (

unknown)



                    date)                                   unknown)  

 

           (unknown)  (no       (unknown)  (unknown)  [place] teeth  (units    (u

nknown)



                    date)                         extracted unknown)  

 

           (unknown)  (no       (unknown)  (unknown)  Yes no 140 37  (units    (

unknown)



                    date)                         Vertex absent unknown)  

 

           (unknown)  (no       (unknown)  (unknown)  Zika virus  (units    (unk

nown)



                    date)                         exposure: No unknown)  

 

           (unknown)  (no       (unknown)  (unknown)  accompanied by  (units    

(unknown)



                    date)                         her . She unknown)  



                                                  went through           



                                                  fertility           



                                                  treatments with           

 

           (unknown)  (no       (unknown)  (unknown)  alcohol intake:  (units   

 (unknown)



                    date)                         never     unknown)  

 

           (unknown)  (no       (unknown)  (unknown)  another   (units    (unkno

wn)



                    date)                         provider. Records unknown)  



                                                  were given to the           



                                                  patient. Routine           



                                                  precautions           

 

           (unknown)  (no       (unknown)  (unknown)  anterior  (units    (unkno

wn)



                    date)                         placenta. Routine unknown)  



                                                  precautions           



                                                  reviewed with the           



                                                  patient. Due to           



                                                  1st                 

 

           (unknown)  (no       (unknown)  (unknown)  anyone in either  (units  

  (unknown)



                    date)                         family with: unknown)  

 

           (unknown)  (no       (unknown)  (unknown)  are never over  (units    

(unknown)



                    date)                         130. She did not unknown)  



                                                  bring in a log.           



                                                  Patient had a           



                                                  reactive            

 

           (unknown)  (no       (unknown)  (unknown)  baby's father  (units    (

unknown)



                    date)                         had a child with unknown)  



                                                  birth defects not           



                                                  listed above and           



                                                  Denies Other           

 

           (unknown)  (no       (unknown)  (unknown)  back pain.  (units    (unk

nown)



                    date)                         Advised   unknown)  



                                                  stretching, belly           



                                                  band, and PT if           



                                                  not improving.           



                                                  AFP was             

 

           (unknown)  (no       (unknown)  (unknown)  blood sugar  (units    (un

known)



                    date)                         diagnostic (Blood unknown)  



                                                  Glucose Test           



                                                  strips) #100 ea           



                                                  23 [Rx           

 

           (unknown)  (no       (unknown)  (unknown)  blood-glucose  (units    (

unknown)



                    date)                         meter #1 ea unknown)  



                                                  23 [Rx           



                                                  Confirmed           



                                                  23]           

 

           (unknown)  (no       (unknown)  (unknown)  breathing. TIFFANIE  (units    

(unknown)



                    date)                         of 10. 60th unknown)  



                                                  percentile for           



                                                  weight. Patient           



                                                  has decided to           



                                                  see                 

 

           (unknown)  (no       (unknown)  (unknown)  by ultrasound is  (units  

  (unknown)



                    date)                         normal with good unknown)  



                                                  fetal movement,           



                                                  normal appearing           



                                                  fluid,              

 

           (unknown)  (no       (unknown)  (unknown)  caffeine: Yes  (units    (

unknown)



                    date)                         (1-2 cups unknown)  



                                                  tea/day)            

 

           (unknown)  (no       (unknown)  (unknown)  carbon monox  (units    (u

nknown)



                    date)                         detector in home: unknown)  



                                                  Yes                 

 

           (unknown)  (no       (unknown)  (unknown)  cfDNA normal  (units    (u

nknown)



                    date)                         female, AFP unknown)  



                                                  negative            

 

           (unknown)  (no       (unknown)  (unknown)  consistent with  (units   

 (unknown)



                    date)                         9 weeks 0 days. unknown)  



                                                  Yolk sac visible.           



                                                  Fetal heart rate           



                                                  178 beats           

 

           (unknown)  (no       (unknown)  (unknown)  contractions.  (units    (

unknown)



                    date)                         Her fasting unknown)  



                                                  glucose has been           



                                                  in the            



                                                  range. She is           

 

           (unknown)  (no       (unknown)  (unknown)  current   (units    (unkno

wn)



                    date)                         occupational unknown)  



                                                  exposures/hazards           



                                                  : No                

 

           (unknown)  (no       (unknown)  (unknown)  currently on  (units    (u

nknown)



                    date)                         metformin 500 mg unknown)  



                                                  twice a day. She           



                                                  had a nonstress           



                                                  test today which           

 

           (unknown)  (no       (unknown)  (unknown)  daily servings  (units    

(unknown)



                    date)                         fruits/ve-4 unknown)  

 

           (unknown)  (no       (unknown)  (unknown)  described, visit  (units  

  (unknown)



                    date)                         schedule  unknown)  



                                                  reviewed,           



                                                  ultrasounds           



                                                  policy reviewed,           



                                                  coverage 24           

 

           (unknown)  (no       (unknown)  (unknown)  developing a  (units    (u

nknown)



                    date)                         pattern. No unknown)  



                                                  leakage of fluid           



                                                  or vaginal           



                                                  bleeding. No           



                                                  contractions           

 

           (unknown)  (no       (unknown)  (unknown)  discussed,  (units    (unk

nown)



                    date)                         tuberculosis unknown)  



                                                  exposure            



                                                  discussed, CMV           



                                                  discussed,           



                                                  Toxoplasmosis           

 

           (unknown)  (no       (unknown)  (unknown)  disorders,  (units    (unk

nown)



                    date)                         Denies Cystic unknown)  



                                                  Fibrosis, Denies           



                                                  Mental              



                                                  Retardation/Autis           



                                                  m, Denies           

 

           (unknown)  (no       (unknown)  (unknown)  do you feel safe  (units  

  (unknown)



                    date)                         at home: Yes unknown)  

 

           (unknown)  (no       (unknown)  (unknown)  during the past  (units   

 (unknown)



                    date)                         year weight has: unknown)  



                                                  remained stable           

 

           (unknown)  (no       (unknown)  (unknown)  education level:  (units  

  (unknown)



                    date)                         college   unknown)  



                                                  (Associate's           



                                                  degree)             

 

           (unknown)  (no       (unknown)  (unknown)  exposure,  (units    (unkn

own)



                    date)                         Medication use, unknown)  



                                                  Sauna/hot tub           



                                                  use, Dental care,           



                                                  Travel and           



                                                  Influenza           

 

           (unknown)  (no       (unknown)  (unknown)  fire      (units    (unkno

wn)



                    date)                         extinguisher in unknown)  



                                                  home: Yes           

 

           (unknown)  (no       (unknown)  (unknown)  firearms in  (units    (un

known)



                    date)                         home: Yes unknown)  



                                                  firearms unloaded           



                                                  and locked: Yes           

 

           (unknown)  (no       (unknown)  (unknown)  frequency: 5-6  (units    

(unknown)



                    date)                         times per week unknown)  

 

           (unknown)  (no       (unknown)  (unknown)  gestational sac  (units   

 (unknown)



                    date)                         with a fetus with unknown)  



                                                  a crown-rump           



                                                  length measuring           



                                                  2.29 cm             

 

           (unknown)  (no       (unknown)  (unknown)  have occurred.  (units    

(unknown)



                    date)                         If there are any unknown)  



                                                  questions, please           



                                                  contact the           



                                                  Medical Records           

 

           (unknown)  (no       (unknown)  (unknown)  hours a day and  (units   

 (unknown)



                    date)                         participation of unknown)  



                                                  father in           



                                                  prenatal care and           



                                                  office visits           

 

           (unknown)  (no       (unknown)  (unknown)  household  (units    (unkn

own)



                    date)                         members: spouse unknown)  



                                                  and family           



                                                  (father and           



                                                  father-in-law)           

 

           (unknown)  (no       (unknown)  (unknown)  housing: house  (units    

(unknown)



                    date)                                   unknown)  

 

           (unknown)  (no       (unknown)  (unknown)  illicit drugs  (units    (

unknown)



                    date)                         and Denies other unknown)  

 

           (unknown)  (no       (unknown)  (unknown)  kag       (units    (unkno

wn)



                    date)                                   unknown)  

 

           (unknown)  (no       (unknown)  (unknown)  lancets #100 ea  (units   

 (unknown)



                    date)                         23 [Rx unknown)  



                                                  Confirmed           



                                                  23]           

 

           (unknown)  (no       (unknown)  (unknown)  last visit.  (units    (un

known)



                    date)                         Plan: AFP today. unknown)  



                                                  20 wk u/s           



                                                  reviewed. F/U 4           



                                                  wks. Warning           



                                                  signs               

 

           (unknown)  (no       (unknown)  (unknown)  leakage of fluid  (units  

  (unknown)



                    date)                         or vaginal unknown)  



                                                  bleeding. No           



                                                   labor           



                                                  symptoms. Patient           

 

           (unknown)  (no       (unknown)  (unknown)  lifting and  (units    (un

known)



                    date)                         other (hiking) unknown)  

 

           (unknown)  (no       (unknown)  (unknown)  lives     (units    (unkno

wn)



                    date)                         independently: unknown)  



                                                  Yes                 

 

           (unknown)  (no       (unknown)  (unknown)  marital status:  (units   

 (unknown)



                    date)                            unknown)  

 

           (unknown)  (no       (unknown)  (unknown)  may occur.  (units    (unk

nown)



                    date)                         Occasional unknown)  



                                                  wrong-word or           



                                                  'sound-alike'           



                                                  substitutions may           



                                                  have                

 

           (unknown)  (no       (unknown)  (unknown)  medication only.  (units  

  (unknown)



                    date)                         Had 7     unknown)  



                                                  miscarriages. No           



                                                  bleeding with           



                                                  this pregnancy.           



                                                  This 1              

 

           (unknown)  (no       (unknown)  (unknown)  metformin 500 mg  (units  

  (unknown)



                    date)                         tablet 500 mg PO unknown)  



                                                  BID #60 tabs           



                                                  23 [Rx           



                                                  Confirmed           



                                                  23]           

 

           (unknown)  (no       (unknown)  (unknown)  metformin 500 mg  (units  

  (unknown)



                    date)                         tablet 750 mg PO unknown)  



                                                  BID #90 tabs           



                                                  23 [Rx           



                                                  Confirmed           



                                                  23]           

 

           (unknown)  (no       (unknown)  (unknown)  movement and  (units    (u

nknown)



                    date)                         denies VB, LOF, unknown)  



                                                  cramping and           



                                                  regular             



                                                  contractions. Pt           



                                                  reports low           

 

           (unknown)  (no       (unknown)  (unknown)  negative.  (units    (unkn

own)



                    date)                         Anatomy US unknown)  



                                                  scheduled for           



                                                  later today.           



                                                  Warning             



                                                  precautions           



                                                  reviewed.           

 

           (unknown)  (no       (unknown)  (unknown)  nickel Allergy  (units    

(unknown)



                    date)                         (Mild, Verified unknown)  



                                                  23 14:37)           

 

           (unknown)  (no       (unknown)  (unknown)  nonstress test  (units    

(unknown)



                    date)                         today. Ultrasound unknown)  



                                                  done today good           



                                                  fetal movement,           



                                                  tone,               

 

           (unknown)  (no       (unknown)  (unknown)  number of  (units    (unkn

own)



                    date)                         children: 0 unknown)  

 

           (unknown)  (no       (unknown)  (unknown)  occupational  (units    (u

nknown)



                    date)                         status: employed unknown)  



                                                  (active duty           



                                                  avionics            



                                                  ,           



                                                  [employer] job            

 

           (unknown)  (no       (unknown)  (unknown)  occurred due to  (units   

 (unknown)



                    date)                         the inherent unknown)  



                                                  limitations of           



                                                  voice recognition           



                                                  software. Please           

 

           (unknown)  (no       (unknown)  (unknown)  or cramping.  (units    (u

nknown)



                    date)                         Plan: 1 hour unknown)  



                                                  glucose ordered.           



                                                  Follow-up in 4           



                                                  weeks. Warning           

 

           (unknown)  (no       (unknown)  (unknown)  per minute.  (units    (un

known)



                    date)                         Normal ovaries unknown)  



                                                  bilaterally.           



                                                  Plan: Follow-up           



                                                  in 4 weeks.           



                                                  Warning             

 

           (unknown)  (no       (unknown)  (unknown)  pets and  (units    (unkno

wn)



                    date)                         animals: Yes (1 unknown)  



                                                  large dog,           



                                                  chickens)           

 

           (unknown)  (no       (unknown)  (unknown)  precautions,  (units    (u

nknown)



                    date)                         Listeriosis unknown)  



                                                  prevention and           



                                                  Rubella             



                                                  Immunization           

 

           (unknown)  (no       (unknown)  (unknown)  preeclampsia.  (units    (

unknown)



                    date)                                   unknown)  

 

           (unknown)  (no       (unknown)  (unknown)  pregnancy  (units    (unkn

own)



                    date)                         discussed unknown)  



                                                  starting baby           



                                                  aspirin per day           



                                                  to decrease her           



                                                  risk for            

 

           (unknown)  (no       (unknown)  (unknown)  prenat.vits,nan,  (units  

  (unknown)



                    date)                         min-iron-folic 1 unknown)  



                                                  tab PO DAILY           



                                                  22 [History           



                                                  Confirmed           

 

           (unknown)  (no       (unknown)  (unknown)  read the note  (units    (

unknown)



                    date)                         carefully and unknown)  



                                                  recognize, using           



                                                  context, where           



                                                  these               



                                                  substitutions           

 

           (unknown)  (no       (unknown)  (unknown)  reviewed.  (units    (unkn

own)



                    date)                                   unknown)  

 

           (unknown)  (no       (unknown)  (unknown)  seatbelt use:  (units    (

unknown)



                    date)                         always    unknown)  

 

           (unknown)  (no       (unknown)  (unknown)  second hand  (units    (un

known)



                    date)                         exposure: Yes unknown)  



                                                  (father-in-law           



                                                  smokes outside           



                                                  the house)           

 

           (unknown)  (no       (unknown)  (unknown)  signs for  (units    (unkn

own)



                    date)                          labor unknown)  



                                                  reviewed.           

 

           (unknown)  (no       (unknown)  (unknown)  signs reviewed.  (units   

 (unknown)



                    date)                         cfDNA ordered. unknown)  

 

           (unknown)  (no       (unknown)  (unknown)  signs reviewed.  (units   

 (unknown)



                    date)                                   unknown)  

 

           (unknown)  (no       (unknown)  (unknown)  software.  (units    (unkn

own)



                    date)                         Although every unknown)  



                                                  effort is made to           



                                                  edit content,           



                                                  transcription           



                                                  errors              

 

           (unknown)  (no       (unknown)  (unknown)  special madi  (units    (

unknown)



                    date)                         needs: No unknown)  

 

           (unknown)  (no       (unknown)  (unknown)  states fasting  (units    

(unknown)



                    date)                         blood sugars have unknown)  



                                                  been in the low           



                                                  90s. The 2 hour           



                                                  postprandial           

 

           (unknown)  (no       (unknown)  (unknown)  substance use  (units    (

unknown)



                    date)                         type: does not unknown)  



                                                  use                 

 

           (unknown)  (no       (unknown)  (unknown)  travel history:  (units   

 (unknown)



                    date)                         over 6 months ago unknown)  

 

           (unknown)  (no       (unknown)  (unknown)  urinary   (units    (unkno

wn)



                    date)                         frequency and unknown)  



                                                  irritability           

 

           (unknown)  (no       (unknown)  (unknown)  vaccine (Annual  (units   

 (unknown)



                    date)                         flu, Phizer Covid unknown)  



                                                  x2)                 

 

           (unknown)  (no       (unknown)  (unknown)  w0d 4 wks  (units    (unkn

own)



                    date)                                   unknown)  

 

           (unknown)  (no       (unknown)  (unknown)  was not using  (units    (

unknown)



                    date)                         any infertility unknown)  



                                                  medications.           



                                                  Ultrasound: An           



                                                  intrauterine           

 

           (unknown)  (no       (unknown)  (unknown)  was reactive. On  (units  

  (unknown)



                    date)                         ultrasound: unknown)  



                                                  Vertex              



                                                  presentation. AGA           



                                                  34 weeks 0 days.           



                                                  5 lb                

 

           (unknown)  (no       (unknown)  (unknown)  water heater  (units    (u

nknown)



                    date)                         temp set < 120 unknown)  



                                                  deg: Yes            

 

           (unknown)  (no       (unknown)  (unknown)  well-balanced  (units    (

unknown)



                    date)                         diet: daily or unknown)  



                                                  most days           

 

           (unknown)  (no       (unknown)  (unknown)  went away with  (units    

(unknown)



                    date)                         laying down. No unknown)  



                                                  vaginal bleeding.           



                                                  No fevers. Fetal           



                                                  heart tone           

 

           (unknown)  (no       (unknown)  (unknown)  while pregnant)  (units   

 (unknown)



                    date)                                   unknown)  

 

           (unknown)  (no       (unknown)  (unknown)  will follow-up  (units    

(unknown)



                    date)                         in 2 weeks with unknown)  



                                                  Dr. Baker for an           



                                                  TIFFANIE/nonstress           



                                                  test. Warning           

 

           (unknown)  (no       (unknown)  (unknown)  wks. Warning  (units    (u

nknown)



                    date)                         signs for PTL unknown)  



                                                  reviewed.           

 

           (unknown)  (no       (unknown)  (unknown)  working smoke  (units    (

unknown)



                    date)                         detector in home: unknown)  



                                                  Yes                 









                                         Result panel 30









           (unknown)  (no       (unknown)  (unknown)  (no value)  (units    (unk

nown)



                    date)                                   unknown)  

 

           (unknown)  (no       (unknown)  (unknown)  (+12 lb) 120/58  (units   

 (unknown)



                    date)                         N         unknown)  

 

           (unknown)  (no       (unknown)  (unknown)  (+13 lb) 104/54  (units   

 (unknown)



                    date)                         N         unknown)  

 

           (unknown)  (no       (unknown)  (unknown)  (+18 lb) 122/60  (units   

 (unknown)



                    date)                         N         unknown)  

 

           (unknown)  (no       (unknown)  (unknown)  (+22 lb) 110/62  (units   

 (unknown)



                    date)                         N         unknown)  

 

           (unknown)  (no       (unknown)  (unknown)  (+23 lb) 116/62  (units   

 (unknown)



                    date)                         N         unknown)  

 

           (unknown)  (no       (unknown)  (unknown)  (+24 lb) 128/62  (units   

 (unknown)



                    date)                         N         unknown)  

 

           (unknown)  (no       (unknown)  (unknown)  (+26 lb) 110/60  (units   

 (unknown)



                    date)                         N         unknown)  

 

           (unknown)  (no       (unknown)  (unknown)  (+7 lb) 128/66 N  (units  

  (unknown)



                    date)                                   unknown)  

 

           (unknown)  (no       (unknown)  (unknown)  (+8 lb) 122/68 N  (units  

  (unknown)



                    date)                                   unknown)  

 

           (unknown)  (no       (unknown)  (unknown)  (1) History of  (units    

(unknown)



                    date)                         recurrent unknown)  



                                                  miscarriages:           

 

           (unknown)  (no       (unknown)  (unknown)  (2) 31 weeks  (units    (u

nknown)



                    date)                         gestation of unknown)  



                                                  pregnancy:           

 

           (unknown)  (no       (unknown)  (unknown)  **Genetic  (units    (unkn

own)



                    date)                         Screening/Teratol unknown)  



                                                  ogy Counseling -           



                                                  Includes patient,           



                                                  baby's father, or           

 

           (unknown)  (no       (unknown)  (unknown)  -?-?-?-?-?-?-?-?  (units  

  (unknown)



                    date)                         -?-?-?-?  unknown)  

 

           (unknown)  (no       (unknown)  (unknown)  23  (units    (unkno

wn)



                    date)                                   unknown)  

 

           (unknown)  (no       (unknown)  (unknown)  23  (units    (unkno

wn)



                    date)                                   unknown)  

 

           (unknown)  (no       (unknown)  (unknown)  23  (units    (unkno

wn)



                    date)                                   unknown)  

 

           (unknown)  (no       (unknown)  (unknown)  23  (units    (unkno

wn)



                    date)                                   unknown)  

 

           (unknown)  (no       (unknown)  (unknown)  23  (units    (unkno

wn)



                    date)                                   unknown)  

 

           (unknown)  (no       (unknown)  (unknown)  23]  (units    (unkn

own)



                    date)                                   unknown)  

 

           (unknown)  (no       (unknown)  (unknown)  23  (units    (

unknown)



                    date)                                   unknown)  

 

           (unknown)  (no       (unknown)  (unknown)  04/15/23  (units    (unkno

wn)



                    date)                         Ultrasound #1 37w unknown)  



                                                  5d                  

 

           (unknown)  (no       (unknown)  (unknown)  [id number]   (units    (unkno

wn)



                    date)                                   unknown)  

 

           (unknown)  (no       (unknown)  (unknown)  22  (units    (unkno

wn)



                    date)                                   unknown)  

 

           (unknown)  (no       (unknown)  (unknown)  10/11/22  (units    (unkno

wn)



                    date)                                   unknown)  

 

           (unknown)  (no       (unknown)  (unknown)  22  (units    (unkno

wn)



                    date)                                   unknown)  

 

           (unknown)  (no       (unknown)  (unknown)  22  (units    (unkno

wn)



                    date)                                   unknown)  

 

           (unknown)  (no       (unknown)  (unknown)  12w 6d 163 lb  (units    (

unknown)



                    date)                                   unknown)  

 

           (unknown)  (no       (unknown)  (unknown)  15:28     (units    (unkno

wn)



                    date)                                   unknown)  

 

           (unknown)  (no       (unknown)  (unknown)  16w 6d 167 lb  (units    (

unknown)



                    date)                                   unknown)  

 

           (unknown)  (no       (unknown)  (unknown)  20w 5d 168 lb  (units    (

unknown)



                    date)                                   unknown)  

 

           (unknown)  (no       (unknown)  (unknown)  24w 6d 173 lb  (units    (

unknown)



                    date)                                   unknown)  

 

           (unknown)  (no       (unknown)  (unknown)  28w 6d 177 lb  (units    (

unknown)



                    date)                                   unknown)  

 

           (unknown)  (no       (unknown)  (unknown)  3 oz. 57%ile.  (units    (

unknown)



                    date)                         TIFFANIE 15.87 cm. unknown)  



                                                  Grade 0 placenta.           



                                                  Plan: Increase           



                                                  metformin to           

 

           (unknown)  (no       (unknown)  (unknown)  3 wks     (units    (unkno

wn)



                    date)                                   unknown)  

 

           (unknown)  (no       (unknown)  (unknown)  31w 6d 179 lb  (units    (

unknown)



                    date)                                   unknown)  

 

           (unknown)  (no       (unknown)  (unknown)  33w 5d 178 lb  (units    (

unknown)



                    date)                                   unknown)  

 

           (unknown)  (no       (unknown)  (unknown)  35w 5d 181 lb  (units    (

unknown)



                    date)                                   unknown)  

 

           (unknown)  (no       (unknown)  (unknown)  4 wk      (units    (unkno

wn)



                    date)                                   unknown)  

 

           (unknown)  (no       (unknown)  (unknown)  750 mg twice a  (units    

(unknown)



                    date)                         day. She will unknown)  



                                                  follow-up in 1           



                                                  week for a           



                                                  nonstress test.           



                                                  She                 

 

           (unknown)  (no       (unknown)  (unknown)  8w 6d 162 lb  (units    (u

nknown)



                    date)                                   unknown)  

 

           (unknown)  (no       (unknown)  (unknown)  Abnormal lab  (units    (u

nknown)



                    date)                         values 1st unknown)  



                                                  trimester:           



                                                  discussed           

 

           (unknown)  (no       (unknown)  (unknown)  Abnormal lab  (units    (u

nknown)



                    date)                         values 2nd unknown)  



                                                  trimester:           



                                                  discussed           

 

           (unknown)  (no       (unknown)  (unknown)  Add'l Plan  (units    (unk

nown)



                    date)                         Details   unknown)  

 

           (unknown)  (no       (unknown)  (unknown)  Age/Sex: 28 / F  (units   

 (unknown)



                    date)                         Date of Service: unknown)  

 

           (unknown)  (no       (unknown)  (unknown)  Allergies  (units    (unkn

own)



                    date)                         ()   unknown)  

 

           (unknown)  (no       (unknown)  (unknown)  Allergies  (units    (unkn

own)



                    date)                                   unknown)  

 

           (unknown)  (no       (unknown)  (unknown)  [place]  (units    (

unknown)



                    date)                         [id number]     unknown)  

 

           (unknown)  (no       (unknown)  (unknown)  Anesthesia  (units    (unk

nown)



                    date)                                   unknown)  

 

           (unknown)  (no       (unknown)  (unknown)  Aneuploidy  (units    (unk

nown)



                    date)                         Screening unknown)  



                                                  Offered: Accepted           



                                                  (wants CFDNA)           

 

           (unknown)  (no       (unknown)  (unknown)  Anticipated  (units    (un

known)



                    date)                         course of unknown)  



                                                  prenatal care:           



                                                  discussed           

 

           (unknown)  (no       (unknown)  (unknown)  Anxiety (-2016)  (units   

 (unknown)



                    date)                                   unknown)  

 

           (unknown)  (no       (unknown)  (unknown)  Arrhythmia  (units    (unk

nown)



                    date)                                   unknown)  

 

           (unknown)  (no       (unknown)  (unknown)  Assessment and  (units    

(unknown)



                    date)                         Plan      unknown)  

 

           (unknown)  (no       (unknown)  (unknown)  Attending :  (units    (

unknown)



                    date)                         Julieta Au unknown)  



                                                  MD                  

 

           (unknown)  (no       (unknown)  (unknown)  Libra presents  (units   

 (unknown)



                    date)                         today for routine unknown)  



                                                  OB f/u at 20w5d.           



                                                  She reports good           



                                                  fetal               

 

           (unknown)  (no       (unknown)  (unknown)  BMI 28.8  (units    (unkno

wn)



                    date)                                   unknown)  

 

           (unknown)  (no       (unknown)  (unknown)  /62  (units    (unkn

own)



                    date)                                   unknown)  

 

           (unknown)  (no       (unknown)  (unknown)  Birth     (units    (unkno

wn)



                    date)                         Plan/Preferences unknown)  

 

           (unknown)  (no       (unknown)  (unknown)  Birth Planning  (units    

(unknown)



                    date)                                   unknown)  

 

           (unknown)  (no       (unknown)  (unknown)  Blood Pressure  (units    

(unknown)



                    date)                         Location Rt unknown)  



                                                  brachial            

 

           (unknown)  (no       (unknown)  (unknown)  Blood     (units    (unkno

wn)



                    date)                         transfusions?: unknown)  



                                                  yes (Never had           



                                                  but would accept)           

 

           (unknown)  (no       (unknown)  (unknown)  Breastfeeding:  (units    

(unknown)



                    date)                         discussed unknown)  

 

           (unknown)  (no       (unknown)  (unknown)  Chicken pox  (units    (un

known)



                    date)                         (-)   unknown)  

 

           (unknown)  (no       (unknown)  (unknown)  Childbirth  (units    (unk

nown)



                    date)                         Classes:  unknown)  



                                                  discussed           

 

           (unknown)  (no       (unknown)  (unknown)  Conceived  (units    (unkn

own)



                    date)                         naturally this unknown)  



                                                  pregnancy           

 

           (unknown)  (no       (unknown)  (unknown)  Confirmed  (units    (unkn

own)



                    date)                         23] unknown)  

 

           (unknown)  (no       (unknown)  (unknown)  Current Estimate  (units  

  (unknown)



                    date)                         23  unknown)  



                                                  Ultrasound #2 37w           



                                                  1d                  

 

           (unknown)  (no       (unknown)  (unknown)  Current   (units    (unkno

wn)



                    date)                         Pregnancy History unknown)  

 

           (unknown)  (no       (unknown)  (unknown)  DNA       (units    (unkno

wn)



                    date)                                   unknown)  

 

           (unknown)  (no       (unknown)  (unknown)  : 1995  (units   

 (unknown)



                    date)                         Acct:[id number] unknown)  

 

           (unknown)  (no       (unknown)  (unknown)  Date of positive  (units  

  (unknown)



                    date)                         home pregnancy unknown)  



                                                  test: 08/15/22           

 

           (unknown)  (no       (unknown)  (unknown)  Date      (units    (unkno

wn)



                    date)                                   unknown)  

 

           (unknown)  (no       (unknown)  (unknown) Denies Congenital  (units  

  (unknown)



                    date)                         Heart Defect, unknown)  



                                                  Denies Down           



                                                  Syndrome, Denies           



                                                  Muscular            



                                                  Dystrophy,           

 

           (unknown)  (no       (unknown)  (unknown)  Denies Maternal  (units   

 (unknown)



                    date)                         Metabolic unknown)  



                                                  Disorder (EG,TYPE           



                                                  1 Diabetes, PKU),           



                                                  Denies Patient or           

 

           (unknown)  (no       (unknown)  (unknown)  Denies Neural  (units    (

unknown)



                    date)                         Tube Defect unknown)  



                                                  (Meningomyelocele           



                                                  , Spina Bifida,           



                                                  or Anencephaly),           

 

           (unknown)  (no       (unknown)  (unknown)  Denies Sickle  (units    (

unknown)



                    date)                         Cell Disease or unknown)  



                                                  Trait (),           



                                                  Denies Hemophilia           



                                                  or other blood           

 

           (unknown)  (no       (unknown)  (unknown)  Denies [place]  (units  

  (unknown)



                    date)                         (Ashkenazi unknown)  



                                                  [employer], [place],           



                                                  [place]),           



                                                  Denies [place]           

 

           (unknown)  (no       (unknown)  (unknown)  Depression  (units    (unk

nown)



                    date)                         (-2016)   unknown)  

 

           (unknown)  (no       (unknown)  (unknown)  Depression:  (units    (un

known)



                    date)                         discussed unknown)  

 

           (unknown)  (no       (unknown)  (unknown)  Dept at   (units    (unkno

wn)



                    date)                         [phone]. unknown)  

 

           (unknown)  (no       (unknown)  (unknown)  Diet and  (units    (unkno

wn)



                    date)                         Exercise  unknown)  

 

           (unknown)  (no       (unknown)  (unknown)  Disease   (units    (unkno

wn)



                    date)                         (Ashkenazi unknown)  



                                                  Advent), Denies           



                                                  Familial            



                                                  Dysautonomia           



                                                  (Ashkenazi           



                                                  Advent),            

 

           (unknown)  (no       (unknown)  (unknown)  Documented By:  (units    

(unknown)



                    date)                         Julieta Au unknown)  



                                                  MD 23 1521           

 

           (unknown)  (no       (unknown)  (unknown)  MEHNAZ Calculator  (units    

(unknown)



                    date)                                   unknown)  

 

           (unknown)  (no       (unknown)  (unknown)  EGA Weight BP  (units    (

unknown)



                    date)                         UGlucose  unknown)  

 

           (unknown)  (no       (unknown)  (unknown)  Eczema (-)  (units    

(unknown)



                    date)                                   unknown)  

 

           (unknown)  (no       (unknown)  (unknown)  Estimated  (units    (unkn

own)



                    date)                         Delivery Date unknown)  



                                                  Method Current           

 

           (unknown)  (no       (unknown)  (unknown)  Exercise and  (units    (u

nknown)



                    date)                         activity, unknown)  



                                                  work/environmenta           



                                                  l/hazards, Sexual           



                                                  activity, X-ray           

 

           (unknown)  (no       (unknown)  (unknown)  F/u in 4 wks.  (units    (

unknown)



                    date)                                   unknown)  

 

           (unknown)  (no       (unknown)  (unknown)  Family History  (units    

(unknown)



                    date)                         (Updated 09/10/22 unknown)  



                                                  @ 21:49 by Fatoumata Flores)             

 

           (unknown)  (no       (unknown)  (unknown)  Family/Other  (units    (u

nknown)



                    date)                         Multiple  unknown)  



                                                  sclerosis           

 

           (unknown)  (no       (unknown)  (unknown)  Father    (units    (unkno

wn)



                    date)                         Hypertension unknown)  

 

           (unknown)  (no       (unknown)  (unknown)  Father of Baby:  (units   

 (unknown)



                    date)                         same      unknown)  

 

           (unknown)  (no       (unknown)  (unknown)  [facility]  (units   

 (unknown)



                    date)                         [facility] unknown)  

 

           (unknown)  (no       (unknown)  (unknown)  First Trimester  (units   

 (unknown)



                    date)                         Education unknown)  



                                                  Checklist           

 

           (unknown)  (no       (unknown)  (unknown)  Foot pain  (units    (unkn

own)



                    date)                         (-)   unknown)  

 

           (unknown)  (no       (unknown)  (unknown)   (SAB  (units    (unkn

own)



                    date)                         x7),Fertility Tx, unknown)  



                                                  meds only,           



                                                  started on baby           



                                                  aspirin             



                                                  10/11/2022           

 

           (unknown)  (no       (unknown)  (unknown)  GDM, on   (units    (unkno

wn)



                    date)                         Metformin 500mg unknown)  



                                                  BID, increased to           



                                                  750mg BID 3/6/23           

 

           (unknown)  (no       (unknown)  (unknown)  Genetic   (units    (unkno

wn)



                    date)                         Screening + unknown)  



                                                  Counseling           

 

           (unknown)  (no       (unknown)  (unknown)  Genetic   (units    (unkno

wn)



                    date)                         Screening unknown)  

 

           (unknown)  (no       (unknown)  (unknown)  Good fetal  (units    (unk

nown)



                    date)                         movement. No unknown)  



                                                  leakage of fluid           



                                                  or vaginal           



                                                  bleeding. No           



                                                  regular             

 

           (unknown)  (no       (unknown)  (unknown)  Grandfather  (units    (un

known)



                    date)                          Cancer unknown)  

 

           (unknown)  (no       (unknown)  (unknown)  Grandfather  (units    (un

known)



                    date)                          Stroke unknown)  

 

           (unknown)  (no       (unknown)  (unknown)  Grandmother  (units    (un

known)



                    date)                          History unknown)  



                                                  of heart disease           

 

           (unknown)  (no       (unknown)  (unknown)  Grandmother  (units    (un

known)



                    date)                          Multiple unknown)  



                                                  sclerosis           

 

           (unknown)  (no       (unknown)  (unknown)   8  (units    (unkn

own)



                    date)                         Multiple births 0 unknown)  

 

           (unknown)  (no       (unknown)  (unknown)  HIV risk  (units    (unkno

wn)



                    date)                         evaluation: low unknown)  



                                                  risk                

 

           (unknown)  (no       (unknown)  (unknown)  [facility] Center  (units    (

unknown)



                    date)                         Education unknown)  

 

           (unknown)  (no       (unknown)  (unknown)  Health center  (units    (

unknown)



                    date)                         information: unknown)  



                                                  nature of           



                                                  practice            



                                                  discussed,           



                                                  prenatal            



                                                  personnel           

 

           (unknown)  (no       (unknown)  (unknown)  Height 5 ft 6 in  (units  

  (unknown)



                    date)                                   unknown)  

 

           (unknown)  (no       (unknown)  (unknown)  Hepatitis C risk  (units  

  (unknown)



                    date)                         evaluation: low unknown)  



                                                  risk                

 

           (unknown)  (no       (unknown)  (unknown)  History of  (units    (unk

nown)



                    date)                         Hepatitis B: No unknown)  

 

           (unknown)  (no       (unknown)  (unknown)  History of  (units    (unk

nown)



                    date)                         Hepatitis C: No unknown)  

 

           (unknown)  (no       (unknown)  (unknown)  History of  (units    (unk

nown)



                    date)                         recurrent unknown)  



                                                  miscarriages           

 

           (unknown)  (no       (unknown)  (unknown)  [facility]  (units    (u

nknown)



                    date)                                   unknown)  

 

           (unknown)  (no       (unknown)  (unknown)  April's  (units    (u

nknown)



                    date)                         Chorea, Denies unknown)  



                                                  Other inherited           



                                                  genetic or           



                                                  chromosomal           



                                                  disorder,           

 

           (unknown)  (no       (unknown)  (unknown)  , [place]  (units    (

unknown)



                    date)                         [place]  unknown)  

 

           (unknown)  (no       (unknown)  (unknown)  Hx #   (units    (u

nknown)



                    date)                         Pregnancies 0 unknown)  



                                                  Elective            



                                                  abortions 0           

 

           (unknown)  (no       (unknown)  (unknown)  Hx # Term  (units    (unkn

own)



                    date)                         Pregnancies 0 unknown)  



                                                  Ectopic             



                                                  pregnancies 0           

 

           (unknown)  (no       (unknown)  (unknown)  Infant will be  (units    

(unknown)



                    date)                         adopted?: no unknown)  

 

           (unknown)  (no       (unknown)  (unknown)  Infection  (units    (unkn

own)



                    date)                         History   unknown)  

 

           (unknown)  (no       (unknown)  (unknown)  Infectious  (units    (unk

nown)



                    date)                         Disease Education unknown)  

 

           (unknown)  (no       (unknown)  (unknown)  Infectious  (units    (unk

nown)



                    date)                         disease exposure: unknown)  



                                                  chicken pox           



                                                  immunity            



                                                  discussed,           



                                                  hepatitis risk           

 

           (unknown)  (no       (unknown)  (unknown)  Infertility  (units    (un

known)



                    date)                         ()   unknown)  

 

           (unknown)  (no       (unknown)  (unknown)  Initial Weight:  (units   

 (unknown)



                    date)                         155 lb    unknown)  

 

           (unknown)  (no       (unknown)  (unknown)  Initials  (units    (unkno

wn)



                    date)                                   unknown)  

 

           (unknown)  (no       (unknown)  (unknown)  Intake Clinical  (units   

 (unknown)



                    date)                         Staff     unknown)  

 

           (unknown)  (no       (unknown)  (unknown)  Intake Note:  (units    (u

nknown)



                    date)                                   unknown)  

 

           (unknown)  (no       (unknown)  (unknown)  Intake performed  (units  

  (unknown)



                    date)                         by:       unknown)  



                                                  Edelmira Montero           

 

           (unknown)  (no       (unknown)  (unknown)  Intake    (units    (unkno

wn)



                    date)                                   unknown)  

 

           (unknown)  (no       (unknown)  (unknown)  Irregular  (units    (unkn

own)



                    date)                         menstrual cycle unknown)  



                                                  ()             

 

           (unknown)  (no       (unknown)  (unknown)  LM        (units    (unkno

wn)



                    date)                                   unknown)  

 

           (unknown)  (no       (unknown)  (unknown)  Lipoma    (units    (unkno

wn)



                    date)                                   unknown)  

 

           (unknown)  (no       (unknown)  (unknown)  Live with  (units    (unkn

own)



                    date)                         someone with TB unknown)  



                                                  or exposed to TB:           



                                                  No                  

 

           (unknown)  (no       (unknown)  (unknown)  Loc: [facility]  (units    (unkno

wn)



                    date)                                   unknown)  

 

           (unknown)  (no       (unknown)  (unknown)  Marital status:  (units   

 (unknown)



                    date)                            unknown)  

 

           (unknown)  (no       (unknown)  (unknown)  Medical History  (units   

 (unknown)



                    date)                         (Updated 23 unknown)  



                                                  @ 20:49 by           



                                                  Julieta uA MD)                 

 

           (unknown)  (no       (unknown)  (unknown)  Medications  (units    (un

known)



                    date)                                   unknown)  

 

           (unknown)  (no       (unknown)  (unknown)  Mother Gastric  (units    

(unknown)



                    date)                         cancer    unknown)  

 

           (unknown)  (no       (unknown)  (unknown)  N No 142 13 N/A  (units   

 (unknown)



                    date)                         4wk       unknown)  

 

           (unknown)  (no       (unknown)  (unknown)  N No no 143 17  (units    

(unknown)



                    date)                         N/A absent AFP 4 unknown)  



                                                  wks                 

 

           (unknown)  (no       (unknown)  (unknown)  N No no 178 9  (units    (

unknown)



                    date)                         N/A absent unknown)  



                                                  long/closed AGA 9           

 

           (unknown)  (no       (unknown)  (unknown)  N Yes no 135 34  (units   

 (unknown)



                    date)                         Vertex absent 2 unknown)  



                                                  wks                 

 

           (unknown)  (no       (unknown)  (unknown)  N Yes no 140 37  (units   

 (unknown)



                    date)                         Vertex absent unknown)  

 

           (unknown)  (no       (unknown)  (unknown)  N Yes no 141 24  (units   

 (unknown)



                    date)                         N/A absent 4 wks unknown)  

 

           (unknown)  (no       (unknown)  (unknown)  N Yes no 142 20  (units   

 (unknown)



                    date)                         N/A absent AFP unknown)  



                                                  negative            

 

           (unknown)  (no       (unknown)  (unknown)  N Yes no 144 29  (units   

 (unknown)



                    date)                         Vertex absent AGA unknown)  



                                                  29w5d               

 

           (unknown)  (no       (unknown)  (unknown)  N Yes no 150 32  (units   

 (unknown)



                    date)                         Vertex absent 2 unknown)  



                                                  wks                 

 

           (unknown)  (no       (unknown)  (unknown)  NF        (units    (unkno

wn)



                    date)                                   unknown)  

 

           (unknown)  (no       (unknown)  (unknown)  Notes     (units    (unkno

wn)



                    date)                                   unknown)  

 

           (unknown)  (no       (unknown)  (unknown)  Number of Living  (units  

  (unknown)



                    date)                         Children 0 unknown)  

 

           (unknown)  (no       (unknown)  (unknown)  Number of  (units    (unkn

own)



                    date)                         fetuses:: Single unknown)  

 

           (unknown)  (no       (unknown)  (unknown)  Nutrition and  (units    (

unknown)



                    date)                         weight gain unknown)  



                                                  counseling:           



                                                  special diet:           



                                                  discussed           

 

           (unknown)  (no       (unknown)  (unknown)  OB Office Visit  (units   

 (unknown)



                    date)                                   unknown)  

 

           (unknown)  (no       (unknown)  (unknown)  OB Visit Log  (units    (u

nknown)



                    date)                                   unknown)  

 

           (unknown)  (no       (unknown)  (unknown)  OB check  (units    (unkno

wn)



                    date)                                   unknown)  

 

           (unknown)  (no       (unknown)  (unknown)  On U/S: AGA  (units    (un

known)



                    date)                         29w5d. 3#4oz unknown)  



                                                  66%ile. Nl AFV.           



                                                  Plan: Metformin           



                                                  500mg BID. F/U 3           

 

           (unknown)  (no       (unknown)  (unknown)  On birth control  (units  

  (unknown)



                    date)                         at conception?: unknown)  



                                                  No                  

 

           (unknown)  (no       (unknown)  (unknown)  Other Estimates  (units   

 (unknown)



                    date)                         23 LMP unknown)  



                                                  (Certain) 39w 2d           

 

           (unknown)  (no       (unknown)  (unknown)  Ovarian cyst  (units    (u

nknown)



                    date)                         ()   unknown)  

 

           (unknown)  (no       (unknown)  (unknown)  PCOS (polycystic  (units  

  (unknown)



                    date)                         ovarian syndrome) unknown)  

 

           (unknown)  (no       (unknown)  (unknown)  PFSH      (units    (unkno

wn)



                    date)                                   unknown)  

 

           (unknown)  (no       (unknown)  (unknown)  Pap performed?:  (units   

 (unknown)



                    date)                         No        unknown)  

 

           (unknown)  (no       (unknown)  (unknown)  Para 0    (units    (unkno

wn)



                    date)                         Spontaneous unknown)  



                                                  abortions 7           

 

           (unknown)  (no       (unknown)  (unknown)  Partner history  (units   

 (unknown)



                    date)                         of STD: denies hx unknown)  

 

           (unknown)  (no       (unknown)  (unknown)  Partner history  (units   

 (unknown)



                    date)                         of genital unknown)  



                                                  herpes: No           

 

           (unknown)  (no       (unknown)  (unknown)  Partner: [place]  (units    (

unknown)



                    date)                         [place]  unknown)  

 

           (unknown)  (no       (unknown)  (unknown)  Patient comes in  (units  

  (unknown)



                    date)                         for follow-up OB unknown)  



                                                  visit. She had           



                                                  some pelvic pain           



                                                  that                

 

           (unknown)  (no       (unknown)  (unknown)  Patient comes in  (units  

  (unknown)



                    date)                         for routine unknown)  



                                                  prenatal visit.           



                                                  Good fetal           



                                                  movement. No           

 

           (unknown)  (no       (unknown)  (unknown)  Patient presents  (units  

  (unknown)



                    date)                         for a new OB unknown)  



                                                  visit at 8 weeks           



                                                  gestation. She is           

 

           (unknown)  (no       (unknown)  (unknown)  Patient presents  (units  

  (unknown)



                    date)                         for a routine PNV unknown)  



                                                  at 31 wks           



                                                  gestation. FBS's           



                                                  90-94. 2            

 

           (unknown)  (no       (unknown)  (unknown)  Patient presents  (units  

  (unknown)



                    date)                         for a routine unknown)  



                                                  prenatal visit at           



                                                  33+ 5 weeks'           



                                                  gestation.           

 

           (unknown)  (no       (unknown)  (unknown)  Patient presents  (units  

  (unknown)



                    date)                         for a routine unknown)  



                                                  prenatal visit,           



                                                  accompanied by           



                                                  her .           

 

           (unknown)  (no       (unknown)  (unknown)  Patient's age 33  (units  

  (unknown)



                    date)                         years or older as unknown)  



                                                  of estimated date           



                                                  of delivery: No           

 

           (unknown)  (no       (unknown)  (unknown)  Patient:  (units    (unkno

wn)



                    date)                         Libra Prieto unknown)  



                                                  MR#: [id number]            

 

           (unknown)  (no       (unknown)  (unknown)  Pediatrician:  (units    (

unknown)



                    date)                         DOD [place] vs unknown)  



                                                  [facility]           



                                                  [facility]           



                                                  [place]             

 

           (unknown)  (no       (unknown)  (unknown)  Personal history  (units  

  (unknown)



                    date)                         of STD: denies hx unknown)  

 

           (unknown)  (no       (unknown)  (unknown)  Personal history  (units  

  (unknown)



                    date)                         of genital unknown)  



                                                  herpes: No           

 

           (unknown)  (no       (unknown)  (unknown)  Plantar   (units    (unkno

wn)



                    date)                         fasciitis unknown)  

 

           (unknown)  (no       (unknown)  (unknown)  Position Sitting  (units  

  (unknown)



                    date)                                   unknown)  

 

           (unknown)  (no       (unknown)  (unknown)  Postpartum  (units    (unk

nown)



                    date)                         family    unknown)  



                                                  planning/Tubal           



                                                  sterilization:           



                                                  further             



                                                  discussion needed           

 

           (unknown)  (no       (unknown)  (unknown)  Pregnancy  (units    (unkn

own)



                    date)                         History   unknown)  

 

           (unknown)  (no       (unknown)  (unknown)  Pregnancy type::  (units  

  (unknown)



                    date)                         Other Normal unknown)  



                                                  Pregnancy           

 

           (unknown)  (no       (unknown)  (unknown)  Prenatal  (units    (unkno

wn)



                    date)                         Education unknown)  

 

           (unknown)  (no       (unknown)  (unknown)  Prenatal Initial  (units  

  (unknown)



                    date)                         Assessment unknown)  

 

           (unknown)  (no       (unknown)  (unknown)  Prenatal  (units    (unkno

wn)



                    date)                         Specific  unknown)  



                                                  Issues/Plans           

 

           (unknown)  (no       (unknown)  (unknown)  Prenatal  (units    (unkno

wn)



                    date)                         Testing:  unknown)  



                                                  discussed           

 

           (unknown)  (no       (unknown)  (unknown)  Prenatal Visit  (units    

(unknown)



                    date)                                   unknown)  

 

           (unknown)  (no       (unknown)  (unknown)  Prenatal  (units    (unkno

wn)



                    date)                         education packet: unknown)  



                                                  Child birth           



                                                  education/plan,           



                                                  Pregnancy           



                                                  symptoms,           

 

           (unknown)  (no       (unknown)  (unknown)  Primary Care  (units    (u

nknown)



                    date)                         Provider: BAS[facility] [place] unknown)  



                                                  [place]              

 

           (unknown)  (no       (unknown)  (unknown)  Primary Ob  (units    (unk

nown)



                    date)                         Provider: unknown)  



                                                  Julieta Au           

 

           (unknown)  (no       (unknown)  (unknown)  Prior     (units    (unkno

wn)



                    date)                         GBS-Infected unknown)  



                                                  child: No           

 

           (unknown)  (no       (unknown)  (unknown)  Providers  (units    (unkn

own)



                    date)                                   unknown)  

 

           (unknown)  (no       (unknown)  (unknown)  Pt presents for  (units   

 (unknown)



                    date)                         a PNV at 16+6 unknown)  



                                                  wks. No FM. No           



                                                  LOF/VB. Rec'd flu           



                                                  shot                

 

           (unknown)  (no       (unknown)  (unknown)  Pt presents for  (units   

 (unknown)



                    date)                         a routine PNV at unknown)  



                                                  28 +6 wks           



                                                  gestation.           



                                                  Abnormal 3 hr           



                                                  GTT.                

 

           (unknown)  (no       (unknown)  (unknown)  Rash or viral  (units    (

unknown)



                    date)                         illness since unknown)  



                                                  last menstrual           



                                                  period: No           

 

           (unknown)  (no       (unknown)  (unknown)  Rash      (units    (unkno

wn)



                    date)                                   unknown)  

 

           (unknown)  (no       (unknown)  (unknown)  Reason For Visit  (units  

  (unknown)



                    date)                                   unknown)  

 

           (unknown)  (no       (unknown)  (unknown)  Recent travel  (units    (

unknown)



                    date)                         outside of unknown)  



                                                  country?: No           

 

           (unknown)  (no       (unknown)  (unknown)  Recurrent  (units    (unkn

own)



                    date)                         pregnancy loss or unknown)  



                                                  a stillbirth: Yes           

 

           (unknown)  (no       (unknown)  (unknown)  Reports over the  (units  

  (unknown)



                    date)                         counter   unknown)  



                                                  medications           



                                                  (diclofenac),           



                                                  Denies alcohol,           



                                                  Denies              

 

           (unknown)  (no       (unknown)  (unknown)  Safety    (units    (unkno

wn)



                    date)                                   unknown)  

 

           (unknown)  (no       (unknown)  (unknown)  Second Trimester  (units  

  (unknown)



                    date)                         Education unknown)  



                                                  Checklist           

 

           (unknown)  (no       (unknown)  (unknown)  Selecting a  (units    (un

known)



                    date)                          care unknown)  



                                                  provider: further           



                                                  discussion needed           

 

           (unknown)  (no       (unknown)  (unknown)  She is at 22  (units    (u

nknown)



                    date)                         weeks 6 days. She unknown)  



                                                  has felt good           



                                                  fetal movement.           



                                                  She says it is           

 

           (unknown)  (no       (unknown)  (unknown)  Shingles  (units    (unkno

wn)



                    date)                                   unknown)  

 

           (unknown)  (no       (unknown)  (unknown)  Signed By:  (units    (unk

nown)



                    date)                         <Electronically unknown)  



                                                  signed by           



                                                  Julieta Au MD>                 

 

           (unknown)  (no       (unknown)  (unknown)  Signed    (units    (unkno

wn)



                    date)                                   unknown)  

 

           (unknown)  (no       (unknown)  (unknown)  Signs and  (units    (unkn

own)



                    date)                         symptoms of unknown)  



                                                   labor:           



                                                  discussed           

 

           (unknown)  (no       (unknown)  (unknown)  Smoking Status:  (units   

 (unknown)



                    date)                         Never smoker unknown)  

 

           (unknown)  (no       (unknown)  (unknown)  Social History  (units    

(unknown)



                    date)                                   unknown)  

 

           (unknown)  (no       (unknown)  (unknown)  Status: Acute  (units    (

unknown)



                    date)                                   unknown)  

 

           (unknown)  (no       (unknown)  (unknown)  Support   (units    (unkno

wn)



                    date)                         Person(s):: [place] unknown)  

 

           (unknown)  (no       (unknown)  (unknown)  Surgical History  (units  

  (unknown)



                    date)                         (Updated 09/10/22 unknown)  



                                                  @ 21:46 by Fatoumata Flores)             

 

           (unknown)  (no       (unknown)  (unknown)  Surrogate  (units    (unkn

own)



                    date)                         pregnancy?: no unknown)  

 

           (unknown)  (no       (unknown)  (unknown)  Symptoms since  (units    

(unknown)



                    date)                         LMP: Reports unknown)  



                                                  amenorrhea,           



                                                  nausea, fatigue,           



                                                  breast              



                                                  tenderness,           

 

           (unknown)  (no       (unknown)  (unknown)  Teratogen  (units    (unkn

own)



                    date)                         Exposures since unknown)  



                                                  LMP/Conception:           



                                                  Denies              



                                                  prescription           



                                                  medications,           

 

           (unknown)  (no       (unknown)  (unknown)  Testing   (units    (unkno

wn)



                    date)                         Education unknown)  

 

           (unknown)  (no       (unknown)  (unknown)  Testing   (units    (unkno

wn)



                    date)                         education unknown)  



                                                  completed: group           



                                                  B strep, Spina           



                                                  bifida testing           



                                                  and Cell Free           

 

           (unknown)  (no       (unknown)  (unknown)  This note may  (units    (

unknown)



                    date)                         have been all or unknown)  



                                                  partially           



                                                  generated using           



                                                  voice recognition           

 

           (unknown)  (no       (unknown)  (unknown)  Tobacco +  (units    (unkn

own)



                    date)                         Substance Use unknown)  

 

           (unknown)  (no       (unknown)  (unknown)  Tobacco Status  (units    

(unknown)



                    date)                                   unknown)  

 

           (unknown)  (no       (unknown)  (unknown)  Trimester:: 3rd  (units   

 (unknown)



                    date)                         Trimester unknown)  



                                                  (28wks-Del)           

 

           (unknown)  (no       (unknown)  (unknown)  Tumor (-10/2012)  (units  

  (unknown)



                    date)                                   unknown)  

 

           (unknown)  (no       (unknown)  (unknown)  Type(s) of  (units    (unk

nown)



                    date)                         exercise: unknown)  



                                                  bicycling           



                                                  (stationary           



                                                  bike), regular           



                                                  exercise, weight           

 

           (unknown)  (no       (unknown)  (unknown) UProtein Movement  (units  

  (unknown)



                    date)                         PreLabor FHR Fndl unknown)  



                                                  Ht Pres Edema           



                                                  Cerv Exam           



                                                  US/Comment Next           



                                                  Appt                

 

           (unknown)  (no       (unknown)  (unknown)  Ultrasound  (units    (unk

nown)



                    date)                         performed?: No unknown)  

 

           (unknown)  (no       (unknown)  (unknown)  Varicella/chicke  (units  

  (unknown)



                    date)                         n pox status: unknown)  



                                                  previous disease           

 

           (unknown)  (no       (unknown)  (unknown)  Visit Date:  (units    (un

known)



                    date)                         23 Last unknown)  



                                                  Updated by:           



                                                  Juleita Au MD                  

 

           (unknown)  (no       (unknown)  (unknown)  Visit Date:  (units    (un

known)



                    date)                         23 Last unknown)  



                                                  Updated by:           



                                                  Julieta Au MD                  

 

           (unknown)  (no       (unknown)  (unknown)  Visit Date:  (units    (un

known)



                    date)                         23 Last unknown)  



                                                  Updated by:           



                                                  Julieta Au MD                  

 

           (unknown)  (no       (unknown)  (unknown)  Visit Date:  (units    (un

known)



                    date)                         23 Last unknown)  



                                                  Updated by:           



                                                  Julieta Au MD                  

 

           (unknown)  (no       (unknown)  (unknown)  Visit Date:  (units    (un

known)



                    date)                         23 Last unknown)  



                                                  Updated by:           



                                                  Fanny Baker MD                  

 

           (unknown)  (no       (unknown)  (unknown)  Visit Date:  (units    (un

known)



                    date)                         22 Last unknown)  



                                                  Updated by:           



                                                  Julieta Au MD                  

 

           (unknown)  (no       (unknown)  (unknown)  Visit Date:  (units    (un

known)



                    date)                         10/11/22 Last unknown)  



                                                  Updated by:           



                                                  Fanny Baker MD                  

 

           (unknown)  (no       (unknown)  (unknown)  Visit Date:  (units    (un

known)



                    date)                         22 Last unknown)  



                                                  Updated by:           



                                                  Julieta Au MD                  

 

           (unknown)  (no       (unknown)  (unknown)  Visit Date:  (units    (un

known)



                    date)                         22 Last unknown)  



                                                  Updated by: Berta Salinas P.A-C           

 

           (unknown)  (no       (unknown)  (unknown)  Visit Reasons:  (units    

(unknown)



                    date)                         OB        unknown)  

 

           (unknown)  (no       (unknown)  (unknown)  Vitals    (units    (unkno

wn)



                    date)                                   unknown)  

 

           (unknown)  (no       (unknown)  (unknown)  Vitamins and  (units    (u

nknown)



                    date)                         iron, Diet and unknown)  



                                                  weight gain, Fish           



                                                  and mercury           



                                                  intake, Caffeine           



                                                  use,                

 

           (unknown)  (no       (unknown)  (unknown)  WG        (units    (unkno

wn)



                    date)                                   unknown)  

 

           (unknown)  (no       (unknown)  (unknown)  Weeks     (units    (unkno

wn)



                    date)                         gestation:: 31 unknown)  

 

           (unknown)  (no       (unknown)  (unknown)  Weight 179 lb  (units    (

unknown)



                    date)                                   unknown)  

 

           (unknown)  (no       (unknown)  (unknown)  [place] teeth  (units    (u

nknown)



                    date)                         extracted unknown)  

 

           (unknown)  (no       (unknown)  (unknown)  Zika virus  (units    (unk

nown)



                    date)                         exposure: No unknown)  

 

           (unknown)  (no       (unknown)  (unknown)  accompanied by  (units    

(unknown)



                    date)                         her . She unknown)  



                                                  went through           



                                                  fertility           



                                                  treatments with           

 

           (unknown)  (no       (unknown)  (unknown)  alcohol intake:  (units   

 (unknown)



                    date)                         never     unknown)  

 

           (unknown)  (no       (unknown)  (unknown)  another   (units    (o

wn)



                    date)                         provider. Records unknown)  



                                                  were given to the           



                                                  patient. Routine           



                                                  precautions           

 

           (unknown)  (no       (unknown)  (unknown)  anterior  (units    (o

wn)



                    date)                         placenta. Routine unknown)  



                                                  precautions           



                                                  reviewed with the           



                                                  patient. Due to           



                                                  1st                 

 

           (unknown)  (no       (unknown)  (unknown)  anyone in either  (units  

  (unknown)



                    date)                         family with: unknown)  

 

           (unknown)  (no       (unknown)  (unknown)  are never over  (units    

(unknown)



                    date)                         130. She did not unknown)  



                                                  bring in a log.           



                                                  Patient had a           



                                                  reactive            

 

           (unknown)  (no       (unknown)  (unknown)  baby's father  (units    (

unknown)



                    date)                         had a child with unknown)  



                                                  birth defects not           



                                                  listed above and           



                                                  Denies Other           

 

           (unknown)  (no       (unknown)  (unknown)  back pain.  (units    (unk

nown)



                    date)                         Advised   unknown)  



                                                  stretching, belly           



                                                  band, and PT if           



                                                  not improving.           



                                                  AFP was             

 

           (unknown)  (no       (unknown)  (unknown)  blood sugar  (units    (un

known)



                    date)                         diagnostic (Blood unknown)  



                                                  Glucose Test           



                                                  strips) #100 ea           



                                                  23 [Rx           

 

           (unknown)  (no       (unknown)  (unknown)  blood-glucose  (units    (

unknown)



                    date)                         meter #1 ea unknown)  



                                                  23 [Rx           



                                                  Confirmed           



                                                  23]           

 

           (unknown)  (no       (unknown)  (unknown)  breathing. TIFFANIE  (units    

(unknown)



                    date)                         of 10. 60th unknown)  



                                                  percentile for           



                                                  weight. Patient           



                                                  has decided to           



                                                  see                 

 

           (unknown)  (no       (unknown)  (unknown)  by ultrasound is  (units  

  (unknown)



                    date)                         normal with good unknown)  



                                                  fetal movement,           



                                                  normal appearing           



                                                  fluid,              

 

           (unknown)  (no       (unknown)  (unknown)  caffeine: Yes  (units    (

unknown)



                    date)                         (1-2 cups unknown)  



                                                  tea/day)            

 

           (unknown)  (no       (unknown)  (unknown)  carbon monox  (units    (u

nknown)



                    date)                         detector in home: unknown)  



                                                  Yes                 

 

           (unknown)  (no       (unknown)  (unknown)  cfDNA normal  (units    (u

nknown)



                    date)                         female, AFP unknown)  



                                                  negative            

 

           (unknown)  (no       (unknown)  (unknown)  consistent with  (units   

 (unknown)



                    date)                         9 weeks 0 days. unknown)  



                                                  Yolk sac visible.           



                                                  Fetal heart rate           



                                                  178 beats           

 

           (unknown)  (no       (unknown)  (unknown)  contractions.  (units    (

unknown)



                    date)                         Her fasting unknown)  



                                                  glucose has been           



                                                  in the            



                                                  range. She is           

 

           (unknown)  (no       (unknown)  (unknown)  current   (units    (unkno

wn)



                    date)                         occupational unknown)  



                                                  exposures/hazards           



                                                  : No                

 

           (unknown)  (no       (unknown)  (unknown)  currently on  (units    (u

nknown)



                    date)                         metformin 500 mg unknown)  



                                                  twice a day. She           



                                                  had a nonstress           



                                                  test today which           

 

           (unknown)  (no       (unknown)  (unknown)  daily servings  (units    

(unknown)



                    date)                         fruits/ve-4 unknown)  

 

           (unknown)  (no       (unknown)  (unknown)  described, visit  (units  

  (unknown)



                    date)                         schedule  unknown)  



                                                  reviewed,           



                                                  ultrasounds           



                                                  policy reviewed,           



                                                  coverage 24           

 

           (unknown)  (no       (unknown)  (unknown)  developing a  (units    (u

nknown)



                    date)                         pattern. No unknown)  



                                                  leakage of fluid           



                                                  or vaginal           



                                                  bleeding. No           



                                                  contractions           

 

           (unknown)  (no       (unknown)  (unknown)  discussed,  (units    (unk

nown)



                    date)                         tuberculosis unknown)  



                                                  exposure            



                                                  discussed, CMV           



                                                  discussed,           



                                                  Toxoplasmosis           

 

           (unknown)  (no       (unknown)  (unknown)  disorders,  (units    (unk

nown)



                    date)                         Denies Cystic unknown)  



                                                  Fibrosis, Denies           



                                                  Mental              



                                                  Retardation/Autis           



                                                  m, Denies           

 

           (unknown)  (no       (unknown)  (unknown)  do you feel safe  (units  

  (unknown)



                    date)                         at home: Yes unknown)  

 

           (unknown)  (no       (unknown)  (unknown)  during the past  (units   

 (unknown)



                    date)                         year weight has: unknown)  



                                                  remained stable           

 

           (unknown)  (no       (unknown)  (unknown)  education level:  (units  

  (unknown)



                    date)                         college   unknown)  



                                                  (Associate's           



                                                  degree)             

 

           (unknown)  (no       (unknown)  (unknown)  exposure,  (units    (unkn

own)



                    date)                         Medication use, unknown)  



                                                  Sauna/hot tub           



                                                  use, Dental care,           



                                                  Travel and           



                                                  Influenza           

 

           (unknown)  (no       (unknown)  (unknown)  fire      (units    (unkno

wn)



                    date)                         extinguisher in unknown)  



                                                  home: Yes           

 

           (unknown)  (no       (unknown)  (unknown)  firearms in  (units    (un

known)



                    date)                         home: Yes unknown)  



                                                  firearms unloaded           



                                                  and locked: Yes           

 

           (unknown)  (no       (unknown)  (unknown)  frequency: 5-6  (units    

(unknown)



                    date)                         times per week unknown)  

 

           (unknown)  (no       (unknown)  (unknown)  gestational sac  (units   

 (unknown)



                    date)                         with a fetus with unknown)  



                                                  a crown-rump           



                                                  length measuring           



                                                  2.29 cm             

 

           (unknown)  (no       (unknown)  (unknown)  have occurred.  (units    

(unknown)



                    date)                         If there are any unknown)  



                                                  questions, please           



                                                  contact the           



                                                  Medical Records           

 

           (unknown)  (no       (unknown)  (unknown)  hours a day and  (units   

 (unknown)



                    date)                         participation of unknown)  



                                                  father in           



                                                  prenatal care and           



                                                  office visits           

 

           (unknown)  (no       (unknown)  (unknown)  household  (units    (unkn

own)



                    date)                         members: spouse unknown)  



                                                  and family           



                                                  (father and           



                                                  father-in-law)           

 

           (unknown)  (no       (unknown)  (unknown)  housing: house  (units    

(unknown)



                    date)                                   unknown)  

 

           (unknown)  (no       (unknown)  (unknown)  hr PP     (units    (unkno

wn)



                    date)                         120's-130's. Max unknown)  



                                                  145. Good FM. No           



                                                  LOF/VB. Plan: NST           



                                                  weekly. F/U 2           

 

           (unknown)  (no       (unknown)  (unknown)  illicit drugs  (units    (

unknown)



                    date)                         and Denies other unknown)  

 

           (unknown)  (no       (unknown)  (unknown)  kag       (units    (unkno

wn)



                    date)                                   unknown)  

 

           (unknown)  (no       (unknown)  (unknown)  lancets #100 ea  (units   

 (unknown)



                    date)                         23 [Rx unknown)  



                                                  Confirmed           



                                                  23]           

 

           (unknown)  (no       (unknown)  (unknown)  last visit.  (units    (un

known)



                    date)                         Plan: AFP today. unknown)  



                                                  20 wk u/s           



                                                  reviewed. F/U 4           



                                                  wks. Warning           



                                                  signs               

 

           (unknown)  (no       (unknown)  (unknown)  leakage of fluid  (units  

  (unknown)



                    date)                         or vaginal unknown)  



                                                  bleeding. No           



                                                   labor           



                                                  symptoms. Patient           

 

           (unknown)  (no       (unknown)  (unknown)  lifting and  (units    (un

known)



                    date)                         other (hiking) unknown)  

 

           (unknown)  (no       (unknown)  (unknown)  lives     (units    (unkno

wn)



                    date)                         independently: unknown)  



                                                  Yes                 

 

           (unknown)  (no       (unknown)  (unknown)  marital status:  (units   

 (unknown)



                    date)                            unknown)  

 

           (unknown)  (no       (unknown)  (unknown)  may occur.  (units    (unk

nown)



                    date)                         Occasional unknown)  



                                                  wrong-word or           



                                                  'sound-alike'           



                                                  substitutions may           



                                                  have                

 

           (unknown)  (no       (unknown)  (unknown)  medication only.  (units  

  (unknown)



                    date)                         Had 7     unknown)  



                                                  miscarriages. No           



                                                  bleeding with           



                                                  this pregnancy.           



                                                  This 1              

 

           (unknown)  (no       (unknown)  (unknown)  metformin 500 mg  (units  

  (unknown)



                    date)                         tablet 500 mg PO unknown)  



                                                  BID #60 tabs           



                                                  23 [Rx           



                                                  Confirmed           



                                                  23]           

 

           (unknown)  (no       (unknown)  (unknown)  metformin 500 mg  (units  

  (unknown)



                    date)                         tablet 750 mg PO unknown)  



                                                  BID #90 tabs           



                                                  23 [Rx           



                                                  Confirmed           



                                                  23]           

 

           (unknown)  (no       (unknown)  (unknown)  movement and  (units    (u

nknown)



                    date)                         denies VB, LOF, unknown)  



                                                  cramping and           



                                                  regular             



                                                  contractions. Pt           



                                                  reports low           

 

           (unknown)  (no       (unknown)  (unknown)  negative.  (units    (unkn

own)



                    date)                         Anatomy US unknown)  



                                                  scheduled for           



                                                  later today.           



                                                  Warning             



                                                  precautions           



                                                  reviewed.           

 

           (unknown)  (no       (unknown)  (unknown)  nickel Allergy  (units    

(unknown)



                    date)                         (Mild, Verified unknown)  



                                                  23 14:37)           

 

           (unknown)  (no       (unknown)  (unknown)  nonstress test  (units    

(unknown)



                    date)                         today. Ultrasound unknown)  



                                                  done today good           



                                                  fetal movement,           



                                                  tone,               

 

           (unknown)  (no       (unknown)  (unknown)  number of  (units    (unkn

own)



                    date)                         children: 0 unknown)  

 

           (unknown)  (no       (unknown)  (unknown)  occupational  (units    (u

nknown)



                    date)                         status: employed unknown)  



                                                  (active duty           



                                                  avionics            



                                                  ,           



                                                  desk job            

 

           (unknown)  (no       (unknown)  (unknown)  occurred due to  (units   

 (unknown)



                    date)                         the inherent unknown)  



                                                  limitations of           



                                                  voice recognition           



                                                  software. Please           

 

           (unknown)  (no       (unknown)  (unknown)  or cramping.  (units    (u

nknown)



                    date)                         Plan: 1 hour unknown)  



                                                  glucose ordered.           



                                                  Follow-up in 4           



                                                  weeks. Warning           

 

           (unknown)  (no       (unknown)  (unknown)  per minute.  (units    (un

known)



                    date)                         Normal ovaries unknown)  



                                                  bilaterally.           



                                                  Plan: Follow-up           



                                                  in 4 weeks.           



                                                  Warning             

 

           (unknown)  (no       (unknown)  (unknown)  pets and  (units    (unkno

wn)



                    date)                         animals: Yes (1 unknown)  



                                                  large dog,           



                                                  chickens)           

 

           (unknown)  (no       (unknown)  (unknown)  precautions,  (units    (u

nknown)



                    date)                         Listeriosis unknown)  



                                                  prevention and           



                                                  Rubella             



                                                  Immunization           

 

           (unknown)  (no       (unknown)  (unknown)  preeclampsia.  (units    (

unknown)



                    date)                                   unknown)  

 

           (unknown)  (no       (unknown)  (unknown)  pregnancy  (units    (unkn

own)



                    date)                         discussed unknown)  



                                                  starting baby           



                                                  aspirin per day           



                                                  to decrease her           



                                                  risk for            

 

           (unknown)  (no       (unknown)  (unknown)  prenat.vits,nan,  (units  

  (unknown)



                    date)                         min-iron-folic 1 unknown)  



                                                  tab PO DAILY           



                                                  22 [History           



                                                  Confirmed           

 

           (unknown)  (no       (unknown)  (unknown)  read the note  (units    (

unknown)



                    date)                         carefully and unknown)  



                                                  recognize, using           



                                                  context, where           



                                                  these               



                                                  substitutions           

 

           (unknown)  (no       (unknown)  (unknown)  reviewed.  (units    (unkn

own)



                    date)                                   unknown)  

 

           (unknown)  (no       (unknown)  (unknown)  seatbelt use:  (units    (

unknown)



                    date)                         always    unknown)  

 

           (unknown)  (no       (unknown)  (unknown)  second hand  (units    (un

known)



                    date)                         exposure: Yes unknown)  



                                                  (father-in-law           



                                                  smokes outside           



                                                  the house)           

 

           (unknown)  (no       (unknown)  (unknown)  signs for  (units    (unkn

own)



                    date)                          labor unknown)  



                                                  reviewed.           

 

           (unknown)  (no       (unknown)  (unknown)  signs reviewed.  (units   

 (unknown)



                    date)                         cfDNA ordered. unknown)  

 

           (unknown)  (no       (unknown)  (unknown)  signs reviewed.  (units   

 (unknown)



                    date)                                   unknown)  

 

           (unknown)  (no       (unknown)  (unknown)  software.  (units    (unkn

own)



                    date)                         Although every unknown)  



                                                  effort is made to           



                                                  edit content,           



                                                  transcription           



                                                  errors              

 

           (unknown)  (no       (unknown)  (unknown)  special madi  (units    (

unknown)



                    date)                         needs: No unknown)  

 

           (unknown)  (no       (unknown)  (unknown)  states fasting  (units    

(unknown)



                    date)                         blood sugars have unknown)  



                                                  been in the low           



                                                  90s. The 2 hour           



                                                  postprandial           

 

           (unknown)  (no       (unknown)  (unknown)  substance use  (units    (

unknown)



                    date)                         type: does not unknown)  



                                                  use                 

 

           (unknown)  (no       (unknown)  (unknown)  travel history:  (units   

 (unknown)



                    date)                         over 6 months ago unknown)  

 

           (unknown)  (no       (unknown)  (unknown)  urinary   (units    (unkno

wn)



                    date)                         frequency and unknown)  



                                                  irritability           

 

           (unknown)  (no       (unknown)  (unknown)  vaccine (Annual  (units   

 (unknown)



                    date)                         flu, Phizer Covid unknown)  



                                                  x2)                 

 

           (unknown)  (no       (unknown)  (unknown)  w0d 4 wks  (units    (unkn

own)



                    date)                                   unknown)  

 

           (unknown)  (no       (unknown)  (unknown)  was not using  (units    (

unknown)



                    date)                         any infertility unknown)  



                                                  medications.           



                                                  Ultrasound: An           



                                                  intrauterine           

 

           (unknown)  (no       (unknown)  (unknown)  was reactive. On  (units  

  (unknown)



                    date)                         ultrasound: unknown)  



                                                  Vertex              



                                                  presentation. AGA           



                                                  34 weeks 0 days.           



                                                  5 lb                

 

           (unknown)  (no       (unknown)  (unknown)  water heater  (units    (u

nknown)



                    date)                         temp set < 120 unknown)  



                                                  deg: Yes            

 

           (unknown)  (no       (unknown)  (unknown)  well-balanced  (units    (

unknown)



                    date)                         diet: daily or unknown)  



                                                  most days           

 

           (unknown)  (no       (unknown)  (unknown)  went away with  (units    

(unknown)



                    date)                         laying down. No unknown)  



                                                  vaginal bleeding.           



                                                  No fevers. Fetal           



                                                  heart tone           

 

           (unknown)  (no       (unknown)  (unknown)  while pregnant)  (units   

 (unknown)



                    date)                                   unknown)  

 

           (unknown)  (no       (unknown)  (unknown)  will follow-up  (units    

(unknown)



                    date)                         in 2 weeks with unknown)  



                                                  Dr. Baker for an           



                                                  TIFFANIE/nonstress           



                                                  test. Warning           

 

           (unknown)  (no       (unknown)  (unknown)  wks. Warning  (units    (u

nknown)



                    date)                         signs for PTL unknown)  



                                                  reviewed.           

 

           (unknown)  (no       (unknown)  (unknown)  wks. Warning  (units    (u

nknown)



                    date)                         signs reviewed. unknown)  

 

           (unknown)  (no       (unknown)  (unknown)  working smoke  (units    (

unknown)



                    date)                         detector in home: unknown)  



                                                  Yes                 







Social History







                     date                description         facility

 

                     2023 00:00    Never smoked tobacco (finding)  [facility]

 

                     2023 00:00    Never smoked tobacco (finding)  [facility]

 

                     2023 00:00    Never smoked tobacco (finding)  [facility]

 

                     2023 00:00    Never smoked tobacco (finding)  [facility]







Vital Signs







                 date            measurement     value           units

 

                 2023 00:00  BMI             28.5            kg/m2

 

                 2023 00:00  BP_diastolic    62              mmHg

 

                 2023 00:00  BP_systolic     110             mmHg

 

                 2023 00:00  height_metric   167.64          cm

 

                 2023 00:00  height_standard  66              in

 

                 2023 00:00  weight_metric   80.28           kg

 

                 2023 00:00  weight_standard  176.99          lb

 

                 2023 00:00  BMI             28.8            kg/m2

 

                 2023 00:00  BP_diastolic    62              mmHg

 

                 2023 00:00  BP_systolic     128             mmHg

 

                 2023 00:00  height_metric   167.64          cm

 

                 2023 00:00  height_standard  66              in

 

                 2023 00:00  weight_metric   81.19           kg

 

                 2023 00:00  weight_standard  178.99          lb

 

                 2023 00:00  BMI             28.7            kg/m2

 

                 2023 00:00  BP_diastolic    62              mmHg

 

                 2023 00:00  BP_systolic     116             mmHg

 

                 2023 00:00  height_metric   167.64          cm

 

                 2023 00:00  height_standard  66              in

 

                 2023 00:00  weight_metric   80.73           kg

 

                 2023 00:00  weight_standard  177.98          lb

 

                 2023 00:00  BMI             29.2            kg/m2

 

                 2023 00:00  BP_diastolic    60              mmHg

 

                 2023 00:00  BP_systolic     110             mmHg

 

                 2023 00:00  height_metric   167.64          cm

 

                 2023 00:00  height_standard  66              in

 

                 2023 00:00  weight_metric   82.1            kg

 

                 2023 00:00  weight_standard  181             lb

## 2023-04-05 NOTE — DELIVERY NOTE
Delivery Note





- Infant Delivery Method


Infant Delivery Method: positive: Spontaneous vaginal delivery





- Birth Presentation


Birth Presentation: positive: Vertex, SUZIE - right occiput anterior





- Nuchal Cord


Nuchal Cord: positive: None





- Anesthetic


Anesthetic: positive: Lidocaine - 1% plain


Volume: positive: 5cc





- Amniotic Fluid Description


Amniotic Fluid Description: positive: Clear





- Episiotomy Type


Episiotomy Type: positive: None





- Laceration


Laceration: positive: 2nd degree (Bilateral vaginal lacerations)





- Suture


Suture Type: positive: Vicryl


Suture Size: positive: 3-0





- Delivery Outcome


Delivery Outcome: positive: Livebirth





- [employer]


[employer]: positive: Placed in direct skin contact with mother, Stimulated, Wa

rmed, [place] used


 sex: positive: Female





- Cord


Cord: positive: 3 vessels





- Placenta


Placenta: positive: Intact, Spontaneous





- Estimated Blood Loss


Estimated Blood Loss (in cc): 200





- Delivery Comments (Free Text/Narrative)


Delivery Comments (Free Text/Narrative): 





Called for delivery. 10/100/+1. Maternal pushing with good efforts. Head 

delivered. Shoulders and body followed with ease.  placed on mother's 

abdomen. Delayed cord clamping. Fundus firm. Placenta delivered spontaneously 

and intact, 3vc. Vagina and perineum inspected, bilateral vaginal lacerations 

noted. Lidocaine 1% infiltrated. Repaired with 3-0 vicryl in usual fashion. 

Hemostasis. Tolerated delivery well. QBL 200cc.

## 2023-04-06 NOTE — PROVIDER PROGRESS NOTE
Progress Note


Postpartum day 1


Doing well


Trying to do nursing but having little difficulty and we will ask nurse to help 

her for lactation


Abdomen soft fundus firm


Perineum intact, repair is good and she is not having any trouble with it


Normal lochia 


plan : possibly home tomorrow with the baby

## 2023-04-07 NOTE — LABOR FLOWSHEET
===================================

Labor Flowsheet

===================================

Datetime Report Generated by CPN: 04/07/2023 13:57

   

   

===========================

Datetime: 04/07/2023 08:17

===========================

   

   

===================================

VITAL SIGNS

===================================

   

 NBP Sys/Elena/Mean (mmHg):  119

:  64

:  78

 Pulse:  90

   

===========================

Datetime: 04/05/2023 23:49

===========================

   

 SpO2 (%):  99

   

===========================

Datetime: 04/05/2023 21:42

===========================

   

 Respirations:  16

 Temperature (C):  36.6

   

===========================

Datetime: 04/05/2023 21:19

===========================

   

Stage of Pregnancy:  Recovery

   

===========================

Datetime: 04/05/2023 21:18

===========================

   

   

===================================

MEDICATIONS

===================================

   

 Pitocin (milliunits):  Started @ 999

   

===========================

Datetime: 04/05/2023 21:17

===========================

   

 LaborFlag:  Labor

   

===========================

Datetime: 04/05/2023 21:00

===========================

   

   

===================================

FETAL ASSESSMENT A

===================================

   

 Monitor Mode:  External US

 FHR Baseline Rate :  120

 Variability:  Moderate 6-25 bpm

 Accelerations:  15X15

 Decelerations:  Variable

 Category:  Category II

 Comments:  pushing

   

===========================

Datetime: 04/05/2023 20:33

===========================

   

   

===================================

STAGE 2

===================================

   

 Pushing:  Coached on Pushing; Urge to Push

 Pushing Position:  Pushing with Contractions; Pushing Left Side

   

===========================

Datetime: 04/05/2023 20:30

===========================

   

   

===================================

UTERINE ACTIVITY

===================================

   

 Monitor Mode:  External

 Frequency (min):  2-3

 Quality:  Strong

 Duration (sec):  60

 Pattern:  Normal: <= 5 Contractions in 10 Minutes

 Resting Tone (Palpate):  Relaxed

   

===========================

Datetime: 04/05/2023 20:23

===========================

   

 Stage 2 Comments:  Provider bedside

   

===========================

Datetime: 04/05/2023 20:14

===========================

   

 FHR Baseline Changes:  Return to Previous Baseline

   

===========================

Datetime: 04/05/2023 20:08

===========================

   

   

===================================

PATIENT CARE

===================================

   

 Patient Position/Activity:  Left Lateral

 Patient Care Comments:  with peanut ball inbetween legs

   

===========================

Datetime: 04/05/2023 19:59

===========================

   

   

===================================

VAGINAL EXAM

===================================

   

 Dilatation (cm):  9.0

 Effacement (%):  100

 Station:  0

 Exam by:  HUMBERTO Cardozo RN 

 Vaginal Bleeding:  Normal Show

   

===================================

COMMUNICATION

===================================

   

 Communication Comments:  Dr. Nava is enroute, to cover for MADDISON Farrell

## 2023-04-07 NOTE — DISCHARGE PLAN
Discharge Plan


Problem Reviewed?: Yes


Disposition: 01 Home, Self Care


Diet: Regular


Activity Restrictions: Activity as Tolerated


Shower Restrictions: No


Driving Restrictions: Yes (until PP check)


Weight Bearing: Partial Weight


No Smoking: If you smoke, Please STOP!  Call [phone] for help.

## 2023-04-07 NOTE — PROVIDER PROGRESS NOTE
Subjective





- Prog Note Date


Prog Note Date: 04/07/23


Prog Note Time: 10:11





- Subjective


Pt reports feeling: Improved (Doing well Using breast pump Using bottles before 

she produces enough milk Not taking any medication for the pain)





Objective





- Vital Signs/Intake & Output


Reviewed Vital Signs: Yes


Vital Signs: 


                                Vital Signs x48h











  Temp Pulse Resp BP Pulse Ox


 


 04/07/23 08:00  98.2 F  90  15  119/64  100











Intake & Output: 


                                 Intake & Output











 04/04/23 04/05/23 04/06/23 04/07/23





 23:59 23:59 23:59 23:59


 


Intake Total  600  


 


Balance  600  














- Objective


General Appearance: positive: No acute distress


Eyes Bilateral: positive: Normal inspection


ENT: positive: ENT inspection nml


Neck: positive: Nml inspection


Respiratory: positive: No respiratory distress


Cardiovascular: positive: Regular rate & rhythm


Abdomen: positive: Non-tender


Back: positive: Nml inspection


Skin: positive: Color nml


Extremities: positive: Non-tender


Neurologic/Psychiatric: positive: Oriented x3





- Lab Results


Fish Bones: 


                                 04/05/23 19:25








Assessment/Plan





- Problem List


(1) Normal postpartum course


Impression: 


Plan: Postpartum follow-up checkup next week with her care provider

## 2023-04-07 NOTE — DISCHARGE SUMMARY
Discharge Summary


Discharge Disposition: 01 Home, Self Care





- DIAGNOSES


Admission Diagnoses: 


Intrauterine pregnancy at term


In active labor








- HPI


History of Present Illness: 





She did have a normal prenatal course and care and she presented after onset of 

spontaneous labor and she quickly progressed and delivered without any 

difficulties





- HOSPITAL COURSE


Hospital Course: 





Uneventful postpartum course





- ALLERGIES


Allergies/Adverse Reactions: 


                                    Allergies











Allergy/AdvReac Type Severity Reaction Status Date / Time


 


No Known Drug Allergies Allergy   Verified 09/15/21 16:36














- MEDICATIONS


Home Medications: 


                                Ambulatory Orders











 Medication  Instructions  Recorded  Confirmed


 


Birth Control 1 tab PO DAILY 06/03/16 03/31/21














- PHYSICAL EXAM AT DISCHARGE


General Appearance: positive: No acute distress


Eyes Bilateral: positive: Normal inspection


ENT: positive: ENT inspection nml


Neck: positive: Nml inspection


Respiratory: positive: No respiratory distress, Breath sounds nml


Cardiovascular: positive: Regular rate & rhythm


Neurologic/Psychiatric: positive: Oriented x3





- LABS


Result Diagrams: 


                                 04/05/23 19:25








- FOLLOW UP


Follow Up: 


Postpartum check next week
